# Patient Record
Sex: FEMALE | Race: WHITE | NOT HISPANIC OR LATINO | Employment: OTHER | URBAN - METROPOLITAN AREA
[De-identification: names, ages, dates, MRNs, and addresses within clinical notes are randomized per-mention and may not be internally consistent; named-entity substitution may affect disease eponyms.]

---

## 2017-01-17 ENCOUNTER — TRANSCRIBE ORDERS (OUTPATIENT)
Dept: LAB | Facility: HOSPITAL | Age: 82
End: 2017-01-17

## 2017-01-17 DIAGNOSIS — D17.71 ANGIOMYOLIPOMA OF RIGHT KIDNEY: Primary | ICD-10-CM

## 2017-01-18 ENCOUNTER — HOSPITAL ENCOUNTER (OUTPATIENT)
Dept: RADIOLOGY | Facility: HOSPITAL | Age: 82
Discharge: HOME | End: 2017-01-18
Attending: UROLOGY
Payer: COMMERCIAL

## 2017-01-18 DIAGNOSIS — D17.71 ANGIOMYOLIPOMA OF RIGHT KIDNEY: ICD-10-CM

## 2017-01-18 PROCEDURE — 76770 US EXAM ABDO BACK WALL COMP: CPT

## 2017-05-19 ENCOUNTER — APPOINTMENT (EMERGENCY)
Dept: RADIOLOGY | Facility: HOSPITAL | Age: 82
End: 2017-05-19
Payer: COMMERCIAL

## 2017-05-19 ENCOUNTER — HOSPITAL ENCOUNTER (EMERGENCY)
Facility: HOSPITAL | Age: 82
Discharge: HOME | End: 2017-05-19
Attending: EMERGENCY MEDICINE | Admitting: EMERGENCY MEDICINE
Payer: COMMERCIAL

## 2017-05-19 VITALS
HEART RATE: 62 BPM | BODY MASS INDEX: 35.5 KG/M2 | HEIGHT: 61 IN | SYSTOLIC BLOOD PRESSURE: 143 MMHG | TEMPERATURE: 98.2 F | DIASTOLIC BLOOD PRESSURE: 68 MMHG | OXYGEN SATURATION: 95 % | WEIGHT: 188 LBS | RESPIRATION RATE: 18 BRPM
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
## 2017-05-19 DIAGNOSIS — S05.42XA LACERATION OF LEFT ORBIT, INITIAL ENCOUNTER: ICD-10-CM

## 2017-05-19 DIAGNOSIS — S09.90XA HEAD INJURY DUE TO TRAUMA, INITIAL ENCOUNTER: ICD-10-CM

## 2017-05-19 PROCEDURE — 99284 EMERGENCY DEPT VISIT MOD MDM: CPT

## 2017-05-19 PROCEDURE — 72125 CT NECK SPINE W/O DYE: CPT

## 2017-05-19 PROCEDURE — 90715 TDAP VACCINE 7 YRS/> IM: CPT | Performed by: EMERGENCY MEDICINE

## 2017-05-19 PROCEDURE — 70450 CT HEAD/BRAIN W/O DYE: CPT

## 2017-05-19 PROCEDURE — 90471 IMMUNIZATION ADMIN: CPT

## 2017-05-19 RX ORDER — VALSARTAN AND HYDROCHLOROTHIAZIDE 160; 25 MG/1; MG/1
1 TABLET ORAL DAILY
COMMUNITY
End: 2018-07-25 | Stop reason: ALTCHOICE

## 2017-05-19 RX ORDER — DIPHENOXYLATE HYDROCHLORIDE AND ATROPINE SULFATE 2.5; .025 MG/1; MG/1
1 TABLET ORAL DAILY
COMMUNITY
End: 2018-08-28 | Stop reason: HOSPADM

## 2017-05-19 RX ORDER — LANOLIN ALCOHOL/MO/W.PET/CERES
1 CREAM (GRAM) TOPICAL 3 TIMES DAILY
COMMUNITY
End: 2018-08-28 | Stop reason: HOSPADM

## 2017-05-19 RX ORDER — ATORVASTATIN CALCIUM 10 MG/1
10 TABLET, FILM COATED ORAL DAILY
COMMUNITY
End: 2018-08-28 | Stop reason: HOSPADM

## 2017-05-19 RX ORDER — CITALOPRAM 20 MG/1
20 TABLET ORAL DAILY
Status: ON HOLD | COMMUNITY
End: 2018-08-25 | Stop reason: ALTCHOICE

## 2017-05-19 RX ADMIN — TETANUS TOXOID, REDUCED DIPHTHERIA TOXOID AND ACELLULAR PERTUSSIS VACCINE, ADSORBED 0.5 ML: 5; 2.5; 8; 8; 2.5 SUSPENSION INTRAMUSCULAR at 12:14

## 2017-06-18 ENCOUNTER — APPOINTMENT (EMERGENCY)
Dept: RADIOLOGY | Facility: HOSPITAL | Age: 82
End: 2017-06-18
Payer: COMMERCIAL

## 2017-06-18 ENCOUNTER — HOSPITAL ENCOUNTER (EMERGENCY)
Facility: HOSPITAL | Age: 82
Discharge: HOME | End: 2017-06-18
Attending: EMERGENCY MEDICINE | Admitting: EMERGENCY MEDICINE
Payer: COMMERCIAL

## 2017-06-18 VITALS
RESPIRATION RATE: 18 BRPM | TEMPERATURE: 97.6 F | HEART RATE: 72 BPM | WEIGHT: 190 LBS | SYSTOLIC BLOOD PRESSURE: 174 MMHG | BODY MASS INDEX: 35.9 KG/M2 | OXYGEN SATURATION: 94 % | DIASTOLIC BLOOD PRESSURE: 79 MMHG

## 2017-06-18 DIAGNOSIS — N39.0 URINARY TRACT INFECTION: ICD-10-CM
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
## 2017-06-18 DIAGNOSIS — S00.81XA FACIAL ABRASION, INITIAL ENCOUNTER: ICD-10-CM

## 2017-06-18 LAB
ANION GAP SERPL CALCULATED.3IONS-SCNC: 8 MMOL/L (ref 4–13)
ATRIAL RATE: 73 BPM
BACTERIA UR QL AUTO: ABNORMAL /HPF
BASOPHILS # BLD AUTO: 0.03 THOUSANDS/ΜL (ref 0–0.1)
BASOPHILS NFR BLD AUTO: 0 % (ref 0–1)
BILIRUB UR QL STRIP: NEGATIVE
BUN SERPL-MCNC: 8 MG/DL (ref 5–25)
CALCIUM SERPL-MCNC: 9.3 MG/DL (ref 8.3–10.1)
CHLORIDE SERPL-SCNC: 96 MMOL/L (ref 100–108)
CLARITY UR: CLEAR
CO2 SERPL-SCNC: 32 MMOL/L (ref 21–32)
COLOR UR: YELLOW
CREAT SERPL-MCNC: 0.5 MG/DL (ref 0.6–1.3)
EOSINOPHIL # BLD AUTO: 0.15 THOUSAND/ΜL (ref 0–0.61)
EOSINOPHIL NFR BLD AUTO: 2 % (ref 0–6)
ERYTHROCYTE [DISTWIDTH] IN BLOOD BY AUTOMATED COUNT: 13.7 % (ref 11.6–15.1)
GFR SERPL CREATININE-BSD FRML MDRD: >60 ML/MIN/1.73SQ M
GLUCOSE SERPL-MCNC: 106 MG/DL (ref 65–140)
GLUCOSE UR STRIP-MCNC: NEGATIVE MG/DL
HCT VFR BLD AUTO: 40.8 % (ref 34.8–46.1)
HGB BLD-MCNC: 13.6 G/DL (ref 11.5–15.4)
HGB UR QL STRIP.AUTO: ABNORMAL
HYALINE CASTS #/AREA URNS LPF: ABNORMAL /LPF
KETONES UR STRIP-MCNC: NEGATIVE MG/DL
LEUKOCYTE ESTERASE UR QL STRIP: ABNORMAL
LYMPHOCYTES # BLD AUTO: 1.45 THOUSANDS/ΜL (ref 0.6–4.47)
LYMPHOCYTES NFR BLD AUTO: 18 % (ref 14–44)
MCH RBC QN AUTO: 30.4 PG (ref 26.8–34.3)
MCHC RBC AUTO-ENTMCNC: 33.3 G/DL (ref 31.4–37.4)
MCV RBC AUTO: 91 FL (ref 82–98)
MONOCYTES # BLD AUTO: 0.73 THOUSAND/ΜL (ref 0.17–1.22)
MONOCYTES NFR BLD AUTO: 9 % (ref 4–12)
NEUTROPHILS # BLD AUTO: 5.81 THOUSANDS/ΜL (ref 1.85–7.62)
NEUTS SEG NFR BLD AUTO: 71 % (ref 43–75)
NITRITE UR QL STRIP: NEGATIVE
NON-SQ EPI CELLS URNS QL MICRO: ABNORMAL /HPF
NRBC BLD AUTO-RTO: 0 /100 WBCS
P AXIS: 83 DEGREES
PH UR STRIP.AUTO: 6.5 [PH] (ref 4.5–8)
PLATELET # BLD AUTO: 279 THOUSANDS/UL (ref 149–390)
PMV BLD AUTO: 9.4 FL (ref 8.9–12.7)
POTASSIUM SERPL-SCNC: 3.4 MMOL/L (ref 3.5–5.3)
PR INTERVAL: 190 MS
PROT UR STRIP-MCNC: ABNORMAL MG/DL
QRS AXIS: 44 DEGREES
QRSD INTERVAL: 94 MS
QT INTERVAL: 388 MS
QTC INTERVAL: 427 MS
RBC # BLD AUTO: 4.47 MILLION/UL (ref 3.81–5.12)
RBC #/AREA URNS AUTO: ABNORMAL /HPF
SODIUM SERPL-SCNC: 136 MMOL/L (ref 136–145)
SP GR UR STRIP.AUTO: 1.01 (ref 1–1.03)
T WAVE AXIS: 71 DEGREES
UROBILINOGEN UR QL STRIP.AUTO: 0.2 E.U./DL
VENTRICULAR RATE: 73 BPM
WBC # BLD AUTO: 8.19 THOUSAND/UL (ref 4.31–10.16)
WBC #/AREA URNS AUTO: ABNORMAL /HPF

## 2017-06-18 PROCEDURE — 87186 SC STD MICRODIL/AGAR DIL: CPT

## 2017-06-18 PROCEDURE — 87077 CULTURE AEROBIC IDENTIFY: CPT

## 2017-06-18 PROCEDURE — 93005 ELECTROCARDIOGRAM TRACING: CPT | Performed by: EMERGENCY MEDICINE

## 2017-06-18 PROCEDURE — 99284 EMERGENCY DEPT VISIT MOD MDM: CPT

## 2017-06-18 PROCEDURE — 85025 COMPLETE CBC W/AUTO DIFF WBC: CPT | Performed by: EMERGENCY MEDICINE

## 2017-06-18 PROCEDURE — 36415 COLL VENOUS BLD VENIPUNCTURE: CPT | Performed by: EMERGENCY MEDICINE

## 2017-06-18 PROCEDURE — 81001 URINALYSIS AUTO W/SCOPE: CPT

## 2017-06-18 PROCEDURE — 80048 BASIC METABOLIC PNL TOTAL CA: CPT | Performed by: EMERGENCY MEDICINE

## 2017-06-18 PROCEDURE — 70450 CT HEAD/BRAIN W/O DYE: CPT

## 2017-06-18 PROCEDURE — 87086 URINE CULTURE/COLONY COUNT: CPT

## 2017-06-18 PROCEDURE — 72125 CT NECK SPINE W/O DYE: CPT

## 2017-06-18 RX ORDER — CEPHALEXIN 250 MG/1
500 CAPSULE ORAL ONCE
Status: COMPLETED | OUTPATIENT
Start: 2017-06-18 | End: 2017-06-18

## 2017-06-18 RX ORDER — CEPHALEXIN 500 MG/1
500 CAPSULE ORAL 4 TIMES DAILY
Qty: 20 CAPSULE | Refills: 0 | Status: SHIPPED | OUTPATIENT
Start: 2017-06-18 | End: 2017-06-23

## 2017-06-18 RX ADMIN — CEPHALEXIN 500 MG: 250 CAPSULE ORAL at 21:41

## 2017-06-21 LAB — BACTERIA UR CULT: NORMAL

## 2017-06-26 ENCOUNTER — TRANSCRIBE ORDERS (OUTPATIENT)
Dept: ADMINISTRATIVE | Facility: HOSPITAL | Age: 82
End: 2017-06-26

## 2017-06-26 DIAGNOSIS — M79.89 LEFT LEG SWELLING: Primary | ICD-10-CM

## 2017-06-27 ENCOUNTER — HOSPITAL ENCOUNTER (OUTPATIENT)
Dept: ULTRASOUND IMAGING | Facility: HOSPITAL | Age: 82
Discharge: HOME | End: 2017-06-27
Payer: COMMERCIAL

## 2017-06-27 DIAGNOSIS — M79.89 LEFT LEG SWELLING: ICD-10-CM

## 2017-06-27 PROCEDURE — 93971 EXTREMITY STUDY: CPT

## 2017-09-18 ENCOUNTER — TRANSCRIBE ORDERS (OUTPATIENT)
Dept: LAB | Facility: HOSPITAL | Age: 82
End: 2017-09-18

## 2017-09-18 ENCOUNTER — APPOINTMENT (OUTPATIENT)
Dept: LAB | Facility: HOSPITAL | Age: 82
End: 2017-09-18
Attending: INTERNAL MEDICINE
Payer: COMMERCIAL

## 2017-09-18 DIAGNOSIS — J44.9 OBSTRUCTIVE CHRONIC BRONCHITIS WITHOUT EXACERBATION (HCC): ICD-10-CM

## 2017-09-18 DIAGNOSIS — D50.9 IRON DEFICIENCY ANEMIA, UNSPECIFIED: ICD-10-CM

## 2017-09-18 DIAGNOSIS — J44.9 OBSTRUCTIVE CHRONIC BRONCHITIS WITHOUT EXACERBATION (HCC): Primary | ICD-10-CM

## 2017-09-18 LAB
ALBUMIN SERPL BCP-MCNC: 3.5 G/DL (ref 3.5–5)
ALP SERPL-CCNC: 105 U/L (ref 46–116)
ALT SERPL W P-5'-P-CCNC: 18 U/L (ref 12–78)
ANION GAP SERPL CALCULATED.3IONS-SCNC: 9 MMOL/L (ref 4–13)
AST SERPL W P-5'-P-CCNC: 22 U/L (ref 5–45)
BILIRUB SERPL-MCNC: 0.54 MG/DL (ref 0.2–1)
BUN SERPL-MCNC: 9 MG/DL (ref 5–25)
CALCIUM SERPL-MCNC: 9.8 MG/DL (ref 8.3–10.1)
CHLORIDE SERPL-SCNC: 95 MMOL/L (ref 100–108)
CO2 SERPL-SCNC: 31 MMOL/L (ref 21–32)
CREAT SERPL-MCNC: 0.63 MG/DL (ref 0.6–1.3)
ERYTHROCYTE [DISTWIDTH] IN BLOOD BY AUTOMATED COUNT: 13.7 % (ref 11.6–15.1)
GFR SERPL CREATININE-BSD FRML MDRD: 84 ML/MIN/1.73SQ M
GLUCOSE P FAST SERPL-MCNC: 82 MG/DL (ref 65–99)
HCT VFR BLD AUTO: 42.4 % (ref 34.8–46.1)
HGB BLD-MCNC: 13.9 G/DL (ref 11.5–15.4)
MCH RBC QN AUTO: 30.3 PG (ref 26.8–34.3)
MCHC RBC AUTO-ENTMCNC: 32.8 G/DL (ref 31.4–37.4)
MCV RBC AUTO: 92 FL (ref 82–98)
PLATELET # BLD AUTO: 335 THOUSANDS/UL (ref 149–390)
PMV BLD AUTO: 10.1 FL (ref 8.9–12.7)
POTASSIUM SERPL-SCNC: 4.2 MMOL/L (ref 3.5–5.3)
PROT SERPL-MCNC: 7.8 G/DL (ref 6.4–8.2)
RBC # BLD AUTO: 4.59 MILLION/UL (ref 3.81–5.12)
SODIUM SERPL-SCNC: 135 MMOL/L (ref 136–145)
WBC # BLD AUTO: 6.82 THOUSAND/UL (ref 4.31–10.16)

## 2017-09-18 PROCEDURE — 36415 COLL VENOUS BLD VENIPUNCTURE: CPT

## 2017-09-18 PROCEDURE — 80053 COMPREHEN METABOLIC PANEL: CPT

## 2017-09-18 PROCEDURE — 85027 COMPLETE CBC AUTOMATED: CPT

## 2017-10-18 ENCOUNTER — HOSPITAL ENCOUNTER (OUTPATIENT)
Dept: RADIOLOGY | Facility: HOSPITAL | Age: 82
Discharge: HOME | End: 2017-10-18
Attending: INTERNAL MEDICINE
Payer: COMMERCIAL

## 2017-10-18 DIAGNOSIS — J44.9 OBSTRUCTIVE CHRONIC BRONCHITIS WITHOUT EXACERBATION (HCC): ICD-10-CM

## 2018-01-12 NOTE — RESULT NOTES
Verified Results  * XR KNEE 3 VW RIGHT NON INJURY 21Sep2016 02:44PM Leny Savage Order Number: [id number]   Performing Comments: Rm 6     Test Name Result Flag Reference   XR KNEE 3 VW RIGHT (Report)     RIGHT KNEE     INDICATION: Right knee pain  COMPARISON: None     VIEWS: 3; 3 images     FINDINGS:     Right total knee arthroplasty is noted  No fracture or dislocation is seen  No lucency is seen about the hardware  No lytic or blastic lesions are seen  Soft tissues are unremarkable  IMPRESSION:     No acute osseous abnormality  Workstation performed: [id number]     Signed by: Phillip Meyers MD   9/21/16       Plan  Unlinked    · XR KNEE 1 OR 2 VIEW LEFT; Status:Active;  Requested for:21Sep2016;

## 2018-01-16 NOTE — MISCELLANEOUS
Message  I called and left a voicemail message for Carmelita to call with any concerns  Signatures   Electronically signed by :  MARAH Weir ; Sep 28 2016  2:14PM EST                       (Author)

## 2018-02-16 ENCOUNTER — TRANSCRIBE ORDERS (OUTPATIENT)
Dept: LAB | Facility: HOSPITAL | Age: 83
End: 2018-02-16

## 2018-02-16 ENCOUNTER — APPOINTMENT (OUTPATIENT)
Dept: LAB | Facility: HOSPITAL | Age: 83
End: 2018-02-16
Attending: INTERNAL MEDICINE
Payer: COMMERCIAL

## 2018-02-16 DIAGNOSIS — R10.13 ABDOMINAL PAIN, EPIGASTRIC: ICD-10-CM

## 2018-02-16 DIAGNOSIS — S27.809A RUPTURE OF DIAPHRAGM: Primary | ICD-10-CM

## 2018-02-16 DIAGNOSIS — R53.83 TIREDNESS: ICD-10-CM

## 2018-02-16 DIAGNOSIS — M79.10 MYALGIA: ICD-10-CM

## 2018-02-16 DIAGNOSIS — E86.0 DEHYDRATION: ICD-10-CM

## 2018-02-16 DIAGNOSIS — R53.81 MALAISE: ICD-10-CM

## 2018-02-16 DIAGNOSIS — E55.9 VITAMIN D DEFICIENCY DISEASE: ICD-10-CM

## 2018-02-16 DIAGNOSIS — E78.5 HYPERLIPIDEMIA, UNSPECIFIED HYPERLIPIDEMIA TYPE: ICD-10-CM

## 2018-02-16 DIAGNOSIS — D58.0 HEREDITARY SPHEROCYTOSIS (HCC): ICD-10-CM

## 2018-02-16 DIAGNOSIS — S27.809A RUPTURE OF DIAPHRAGM: ICD-10-CM

## 2018-02-16 DIAGNOSIS — R30.0 DYSURIA: ICD-10-CM

## 2018-02-16 LAB
BASOPHILS # BLD AUTO: 0.04 THOUSANDS/ΜL (ref 0–0.1)
BASOPHILS NFR BLD AUTO: 1 % (ref 0–1)
EOSINOPHIL # BLD AUTO: 0.1 THOUSAND/ΜL (ref 0–0.61)
EOSINOPHIL NFR BLD AUTO: 2 % (ref 0–6)
ERYTHROCYTE [DISTWIDTH] IN BLOOD BY AUTOMATED COUNT: 13.4 % (ref 11.6–15.1)
FERRITIN SERPL-MCNC: 46 NG/ML (ref 8–388)
HCT VFR BLD AUTO: 41 % (ref 34.8–46.1)
HGB BLD-MCNC: 13.7 G/DL (ref 11.5–15.4)
IRON SERPL-MCNC: 77 UG/DL (ref 50–170)
LYMPHOCYTES # BLD AUTO: 2.07 THOUSANDS/ΜL (ref 0.6–4.47)
LYMPHOCYTES NFR BLD AUTO: 31 % (ref 14–44)
MCH RBC QN AUTO: 30.2 PG (ref 26.8–34.3)
MCHC RBC AUTO-ENTMCNC: 33.4 G/DL (ref 31.4–37.4)
MCV RBC AUTO: 90 FL (ref 82–98)
MONOCYTES # BLD AUTO: 0.67 THOUSAND/ΜL (ref 0.17–1.22)
MONOCYTES NFR BLD AUTO: 10 % (ref 4–12)
NEUTROPHILS # BLD AUTO: 3.88 THOUSANDS/ΜL (ref 1.85–7.62)
NEUTS SEG NFR BLD AUTO: 56 % (ref 43–75)
NRBC BLD AUTO-RTO: 0 /100 WBCS
PLATELET # BLD AUTO: 331 THOUSANDS/UL (ref 149–390)
PMV BLD AUTO: 10.5 FL (ref 8.9–12.7)
RBC # BLD AUTO: 4.54 MILLION/UL (ref 3.81–5.12)
WBC # BLD AUTO: 6.77 THOUSAND/UL (ref 4.31–10.16)

## 2018-02-16 PROCEDURE — 36415 COLL VENOUS BLD VENIPUNCTURE: CPT

## 2018-02-16 PROCEDURE — 85025 COMPLETE CBC W/AUTO DIFF WBC: CPT

## 2018-02-16 PROCEDURE — 83540 ASSAY OF IRON: CPT

## 2018-02-16 PROCEDURE — 82728 ASSAY OF FERRITIN: CPT

## 2018-02-21 ENCOUNTER — APPOINTMENT (OUTPATIENT)
Dept: LAB | Facility: HOSPITAL | Age: 83
End: 2018-02-21
Payer: COMMERCIAL

## 2018-02-21 ENCOUNTER — OFFICE VISIT (OUTPATIENT)
Dept: CARDIOLOGY CLINIC | Facility: CLINIC | Age: 83
End: 2018-02-21
Payer: COMMERCIAL

## 2018-02-21 VITALS
BODY MASS INDEX: 32.28 KG/M2 | WEIGHT: 171 LBS | HEART RATE: 66 BPM | DIASTOLIC BLOOD PRESSURE: 80 MMHG | HEIGHT: 61 IN | SYSTOLIC BLOOD PRESSURE: 136 MMHG

## 2018-02-21 DIAGNOSIS — Z72.0 TOBACCO ABUSE: ICD-10-CM

## 2018-02-21 DIAGNOSIS — R30.0 DYSURIA: ICD-10-CM

## 2018-02-21 DIAGNOSIS — S27.809A RUPTURE OF DIAPHRAGM: ICD-10-CM

## 2018-02-21 DIAGNOSIS — M79.10 MYALGIA: ICD-10-CM

## 2018-02-21 DIAGNOSIS — R53.83 TIREDNESS: ICD-10-CM

## 2018-02-21 DIAGNOSIS — E78.00 PURE HYPERCHOLESTEROLEMIA: ICD-10-CM

## 2018-02-21 DIAGNOSIS — D58.0 HEREDITARY SPHEROCYTOSIS (HCC): ICD-10-CM

## 2018-02-21 DIAGNOSIS — R53.81 MALAISE: ICD-10-CM

## 2018-02-21 DIAGNOSIS — E78.5 HYPERLIPIDEMIA, UNSPECIFIED HYPERLIPIDEMIA TYPE: ICD-10-CM

## 2018-02-21 DIAGNOSIS — I10 ESSENTIAL (PRIMARY) HYPERTENSION: Primary | ICD-10-CM

## 2018-02-21 DIAGNOSIS — E86.0 DEHYDRATION: ICD-10-CM

## 2018-02-21 DIAGNOSIS — R10.13 ABDOMINAL PAIN, EPIGASTRIC: ICD-10-CM

## 2018-02-21 DIAGNOSIS — E55.9 VITAMIN D DEFICIENCY DISEASE: ICD-10-CM

## 2018-02-21 LAB
ALBUMIN SERPL BCP-MCNC: 3.9 G/DL (ref 3.5–5)
ALP SERPL-CCNC: 98 U/L (ref 46–116)
ALT SERPL W P-5'-P-CCNC: 18 U/L (ref 12–78)
ANION GAP SERPL CALCULATED.3IONS-SCNC: 4 MMOL/L (ref 4–13)
AST SERPL W P-5'-P-CCNC: 17 U/L (ref 5–45)
BACTERIA UR QL AUTO: ABNORMAL /HPF
BASOPHILS # BLD AUTO: 0.03 THOUSANDS/ΜL (ref 0–0.1)
BASOPHILS NFR BLD AUTO: 1 % (ref 0–1)
BILIRUB SERPL-MCNC: 0.5 MG/DL (ref 0.2–1)
BILIRUB UR QL STRIP: NEGATIVE
BUN SERPL-MCNC: 12 MG/DL (ref 5–25)
CALCIUM SERPL-MCNC: 9.6 MG/DL (ref 8.3–10.1)
CHLORIDE SERPL-SCNC: 97 MMOL/L (ref 100–108)
CHOLEST SERPL-MCNC: 179 MG/DL (ref 50–200)
CLARITY UR: ABNORMAL
CO2 SERPL-SCNC: 34 MMOL/L (ref 21–32)
COLOR UR: YELLOW
CREAT SERPL-MCNC: 0.56 MG/DL (ref 0.6–1.3)
EOSINOPHIL # BLD AUTO: 0.1 THOUSAND/ΜL (ref 0–0.61)
EOSINOPHIL NFR BLD AUTO: 2 % (ref 0–6)
ERYTHROCYTE [DISTWIDTH] IN BLOOD BY AUTOMATED COUNT: 13.5 % (ref 11.6–15.1)
GFR SERPL CREATININE-BSD FRML MDRD: 87 ML/MIN/1.73SQ M
GLUCOSE P FAST SERPL-MCNC: 93 MG/DL (ref 65–99)
GLUCOSE UR STRIP-MCNC: NEGATIVE MG/DL
HCT VFR BLD AUTO: 44.3 % (ref 34.8–46.1)
HDLC SERPL-MCNC: 72 MG/DL (ref 40–60)
HGB BLD-MCNC: 15 G/DL (ref 11.5–15.4)
HGB UR QL STRIP.AUTO: NEGATIVE
HYALINE CASTS #/AREA URNS LPF: ABNORMAL /LPF
KETONES UR STRIP-MCNC: NEGATIVE MG/DL
LDLC SERPL CALC-MCNC: 93 MG/DL (ref 0–100)
LEUKOCYTE ESTERASE UR QL STRIP: ABNORMAL
LYMPHOCYTES # BLD AUTO: 1.42 THOUSANDS/ΜL (ref 0.6–4.47)
LYMPHOCYTES NFR BLD AUTO: 27 % (ref 14–44)
MCH RBC QN AUTO: 30.7 PG (ref 26.8–34.3)
MCHC RBC AUTO-ENTMCNC: 33.9 G/DL (ref 31.4–37.4)
MCV RBC AUTO: 91 FL (ref 82–98)
MONOCYTES # BLD AUTO: 0.59 THOUSAND/ΜL (ref 0.17–1.22)
MONOCYTES NFR BLD AUTO: 11 % (ref 4–12)
NEUTROPHILS # BLD AUTO: 3.18 THOUSANDS/ΜL (ref 1.85–7.62)
NEUTS SEG NFR BLD AUTO: 59 % (ref 43–75)
NITRITE UR QL STRIP: POSITIVE
NON-SQ EPI CELLS URNS QL MICRO: ABNORMAL /HPF
NRBC BLD AUTO-RTO: 0 /100 WBCS
PH UR STRIP.AUTO: 7 [PH] (ref 4.5–8)
PLATELET # BLD AUTO: 315 THOUSANDS/UL (ref 149–390)
PMV BLD AUTO: 10.8 FL (ref 8.9–12.7)
POTASSIUM SERPL-SCNC: 3.9 MMOL/L (ref 3.5–5.3)
PROT SERPL-MCNC: 7.8 G/DL (ref 6.4–8.2)
PROT UR STRIP-MCNC: ABNORMAL MG/DL
RBC # BLD AUTO: 4.89 MILLION/UL (ref 3.81–5.12)
RBC #/AREA URNS AUTO: ABNORMAL /HPF
SODIUM SERPL-SCNC: 135 MMOL/L (ref 136–145)
SP GR UR STRIP.AUTO: 1.01 (ref 1–1.03)
T3FREE SERPL-MCNC: 2.85 PG/ML (ref 2.3–4.2)
T4 FREE SERPL-MCNC: 1.04 NG/DL (ref 0.76–1.46)
TRIGL SERPL-MCNC: 71 MG/DL
TSH SERPL DL<=0.05 MIU/L-ACNC: 0.98 UIU/ML (ref 0.36–3.74)
UROBILINOGEN UR QL STRIP.AUTO: 1 E.U./DL
VIT B12 SERPL-MCNC: 489 PG/ML (ref 100–900)
WBC # BLD AUTO: 5.33 THOUSAND/UL (ref 4.31–10.16)
WBC #/AREA URNS AUTO: ABNORMAL /HPF

## 2018-02-21 PROCEDURE — 80061 LIPID PANEL: CPT

## 2018-02-21 PROCEDURE — 99204 OFFICE O/P NEW MOD 45 MIN: CPT | Performed by: INTERNAL MEDICINE

## 2018-02-21 PROCEDURE — 85025 COMPLETE CBC W/AUTO DIFF WBC: CPT

## 2018-02-21 PROCEDURE — 87077 CULTURE AEROBIC IDENTIFY: CPT

## 2018-02-21 PROCEDURE — 81001 URINALYSIS AUTO W/SCOPE: CPT

## 2018-02-21 PROCEDURE — 82306 VITAMIN D 25 HYDROXY: CPT

## 2018-02-21 PROCEDURE — 84481 FREE ASSAY (FT-3): CPT

## 2018-02-21 PROCEDURE — 93000 ELECTROCARDIOGRAM COMPLETE: CPT | Performed by: INTERNAL MEDICINE

## 2018-02-21 PROCEDURE — 80053 COMPREHEN METABOLIC PANEL: CPT

## 2018-02-21 PROCEDURE — 84443 ASSAY THYROID STIM HORMONE: CPT

## 2018-02-21 PROCEDURE — 84439 ASSAY OF FREE THYROXINE: CPT

## 2018-02-21 PROCEDURE — 82607 VITAMIN B-12: CPT

## 2018-02-21 PROCEDURE — 87086 URINE CULTURE/COLONY COUNT: CPT

## 2018-02-21 PROCEDURE — 87186 SC STD MICRODIL/AGAR DIL: CPT

## 2018-02-21 PROCEDURE — 36415 COLL VENOUS BLD VENIPUNCTURE: CPT

## 2018-02-21 NOTE — PATIENT INSTRUCTIONS
I will continue the patient's current medical regimen  I have asked the patient to call if there is a problem in the interim otherwise I will see the patient in six months time  The patient is due for an echo prior to the next visit to assess LV wall thickness and systolic function    I will also schedule a pharmacologic nuclear stress test

## 2018-02-21 NOTE — PROGRESS NOTES
Cardiology Consultation     Rudy Kaye  [id number]  1935  HEART & VASCULAR [facility] [address]  1  Pure hypercholesterolemia     2  Tobacco abuse     3  Essential (primary) hypertension       There is no problem list on file for this patient  HPI patient is here for a cardiac evaluation  Patient has a history of COPD, hypertension and hyperlipidemia  She has a tobacco use history  Recently she was watching a TV program about two weeks ago and got an episode of chest discomfort  The following morning after eating her breakfast she got a similar type of discomfort and it has happened subsequently associated with eating  It is a pressure sensation in her chest   She denies any difficulty swallowing  She does relate to me that she went to her gastroenterologist and was seen and a cardiac evaluation was recommended  She does tell me she had prior EGD done by Dr Favian Lopez  and had a procedure performed remotely but cannot remember what it was  She has no prior history of cardiac disease  She is a long-term tobacco user  Her mother  at the age of 80 from a heart attack and her father apparently had a cardiac aneurysm and  at the age of 76  Her  dose of his remotely known to me in passed away 11 years ago  She is here for advice in reference to further management  Patient's EKG today demonstrates sinus rhythm with poor R-wave progression  Compared to a prior EKG done 2017 dear has been no significant interval change except perhaps for some change in chest lead placement on today's tracing  PMH-  Hiatal hernia  Past Medical History:   Diagnosis Date    COPD [facility]chronic obstructive pulmonary disease) ([facility] [address] )     Hyperlipidemia     Hypertension         SOCIAL HISTORY-  Social History     Social History    Marital status:       Spouse name: N/A    Number of children: N/A    Years of education: N/A     Occupational History    Not on file  Social History Main Topics    Smoking status: Current Every Day Smoker     Packs/day: 1 00     Types: Cigarettes    Smokeless tobacco: Never Used    Alcohol use No    Drug use: No    Sexual activity: Not on file     Other Topics Concern    Not on file     Social History Narrative    No narrative on file        FAMILY HISTORY-  Mother  of a heart attack at the age of 80 and her father had a cardiac aneurysm and  at the age of 76  No family history on file  SURGICAL HISTORY-  Past Surgical History:   Procedure Laterality Date    HIP SURGERY      REPLACEMENT TOTAL KNEE Right          Current Outpatient Prescriptions:     atorvastatin (LIPITOR) 10 mg tablet, Take 10 mg by mouth daily, Disp: , Rfl:     Calcium Citrate-Vitamin D (CALCIUM + D PO), Take by mouth, Disp: , Rfl:     citalopram (CeleXA) 20 mg tablet, Take 20 mg by mouth daily, Disp: , Rfl:     glucosamine-chondroitin 500-400 MG tablet, Take 1 tablet by mouth 3 (three) times a day, Disp: , Rfl:     Iron-Vit C-Vit B12-Folic Acid (IRON 333 PLUS PO), Take by mouth, Disp: , Rfl:     multivitamin (THERAGRAN) TABS, Take 1 tablet by mouth daily, Disp: , Rfl:     Psyllium (METAMUCIL FIBER PO), Take by mouth, Disp: , Rfl:     Tiotropium Bromide Monohydrate (SPIRIVA HANDIHALER IN), Inhale, Disp: , Rfl:     valsartan-hydrochlorothiazide (DIOVAN-HCT) 160-25 MG per tablet, Take 1 tablet by mouth daily, Disp: , Rfl:   No Known Allergies  There were no vitals filed for this visit  Review of Systems:  Review of Systems   Cardiovascular: Positive for chest pain  All other systems reviewed and are negative  Physical Exam:  Physical Exam   Constitutional: She is oriented to person, place, and time  She appears well-developed and well-nourished  HENT:   Head: Normocephalic and atraumatic     Eyes: Conjunctivae and EOM are normal  Pupils are equal, round, and reactive to light  Neck: Normal range of motion  Neck supple  Cardiovascular: Normal rate and normal heart sounds  Pulmonary/Chest: Effort normal    Reduced breath sounds   Neurological: She is alert and oriented to person, place, and time  Skin: Skin is warm and dry  Psychiatric: She has a normal mood and affect  Vitals reviewed  Discussion/Summary:  The etiology of her discomfort is unclear to me  Initially was not associated with food but thereafter it has been associated with food with an unclear prior GI history  In any event we will exclude a cardiac etiology given her multiple risk factors for coronary disease and her age  We will check a pharmacologic nuclear stress test as she cannot walk adequately on a treadmill for exercise stress  We will check an echocardiogram to assess LV wall thickness and systolic function  I will arrange follow-up visit  I have asked her to call me in the interim if she has an issue

## 2018-02-23 LAB — BACTERIA UR CULT: ABNORMAL

## 2018-02-24 LAB
25(OH)D2 SERPL-MCNC: <1 NG/ML
25(OH)D3 SERPL-MCNC: 43 NG/ML
25(OH)D3+25(OH)D2 SERPL-MCNC: 43 NG/ML

## 2018-03-12 ENCOUNTER — HOSPITAL ENCOUNTER (OUTPATIENT)
Dept: NON INVASIVE DIAGNOSTICS | Facility: CLINIC | Age: 83
Discharge: HOME | End: 2018-03-12
Payer: COMMERCIAL

## 2018-03-12 DIAGNOSIS — E78.00 PURE HYPERCHOLESTEROLEMIA: ICD-10-CM

## 2018-03-12 DIAGNOSIS — I10 ESSENTIAL (PRIMARY) HYPERTENSION: ICD-10-CM

## 2018-03-12 DIAGNOSIS — Z72.0 TOBACCO ABUSE: ICD-10-CM

## 2018-03-12 PROCEDURE — 93017 CV STRESS TEST TRACING ONLY: CPT

## 2018-03-12 PROCEDURE — 93306 TTE W/DOPPLER COMPLETE: CPT

## 2018-03-12 PROCEDURE — 93018 CV STRESS TEST I&R ONLY: CPT | Performed by: INTERNAL MEDICINE

## 2018-03-12 PROCEDURE — 93016 CV STRESS TEST SUPVJ ONLY: CPT | Performed by: INTERNAL MEDICINE

## 2018-03-12 PROCEDURE — 78452 HT MUSCLE IMAGE SPECT MULT: CPT | Performed by: INTERNAL MEDICINE

## 2018-03-12 PROCEDURE — 78452 HT MUSCLE IMAGE SPECT MULT: CPT

## 2018-03-12 PROCEDURE — A9502 TC99M TETROFOSMIN: HCPCS

## 2018-03-12 RX ADMIN — REGADENOSON 0.4 MG: 0.08 INJECTION, SOLUTION INTRAVENOUS at 13:35

## 2018-03-13 LAB
CHEST PAIN STATEMENT: NORMAL
MAX DIASTOLIC BP: 96 MMHG
MAX HEART RATE: 106 BPM
MAX PREDICTED HEART RATE: 138 BPM
MAX. SYSTOLIC BP: 170 MMHG
PROTOCOL NAME: NORMAL
REASON FOR TERMINATION: NORMAL
TARGET HR FORMULA: NORMAL
TEST INDICATION: NORMAL
TIME IN EXERCISE PHASE: NORMAL

## 2018-03-14 ENCOUNTER — TELEPHONE (OUTPATIENT)
Dept: CARDIOLOGY CLINIC | Facility: CLINIC | Age: 83
End: 2018-03-14

## 2018-06-06 ENCOUNTER — TRANSCRIBE ORDERS (OUTPATIENT)
Dept: ADMINISTRATIVE | Facility: HOSPITAL | Age: 83
End: 2018-06-06

## 2018-06-06 DIAGNOSIS — C15.9 MALIGNANT NEOPLASM OF ESOPHAGUS, UNSPECIFIED LOCATION (HCC): ICD-10-CM

## 2018-06-06 DIAGNOSIS — R10.13 EPIGASTRIC PAIN: Primary | ICD-10-CM

## 2018-06-13 ENCOUNTER — HOSPITAL ENCOUNTER (OUTPATIENT)
Dept: RADIOLOGY | Facility: HOSPITAL | Age: 83
Discharge: HOME | End: 2018-06-13
Attending: INTERNAL MEDICINE
Payer: COMMERCIAL

## 2018-06-13 DIAGNOSIS — R10.13 EPIGASTRIC PAIN: ICD-10-CM

## 2018-06-13 PROCEDURE — 74220 X-RAY XM ESOPHAGUS 1CNTRST: CPT

## 2018-06-20 ENCOUNTER — HOSPITAL ENCOUNTER (OUTPATIENT)
Facility: HOSPITAL | Age: 83
Setting detail: OUTPATIENT SURGERY
Discharge: HOME | End: 2018-06-20
Attending: INTERNAL MEDICINE | Admitting: INTERNAL MEDICINE
Payer: COMMERCIAL

## 2018-06-20 ENCOUNTER — ANESTHESIA (OUTPATIENT)
Dept: GASTROENTEROLOGY | Facility: HOSPITAL | Age: 83
End: 2018-06-20
Payer: COMMERCIAL

## 2018-06-20 ENCOUNTER — ANESTHESIA EVENT (OUTPATIENT)
Dept: GASTROENTEROLOGY | Facility: HOSPITAL | Age: 83
End: 2018-06-20
Payer: COMMERCIAL

## 2018-06-20 VITALS
WEIGHT: 162 LBS | TEMPERATURE: 98.6 F | DIASTOLIC BLOOD PRESSURE: 62 MMHG | HEART RATE: 64 BPM | RESPIRATION RATE: 16 BRPM | HEIGHT: 61 IN | SYSTOLIC BLOOD PRESSURE: 128 MMHG | OXYGEN SATURATION: 98 % | BODY MASS INDEX: 30.58 KG/M2

## 2018-06-20 DIAGNOSIS — K21.9 GASTRO-ESOPHAGEAL REFLUX DISEASE WITHOUT ESOPHAGITIS: ICD-10-CM

## 2018-06-20 DIAGNOSIS — R13.10 DYSPHAGIA: ICD-10-CM

## 2018-06-20 PROCEDURE — 88341 IMHCHEM/IMCYTCHM EA ADD ANTB: CPT | Performed by: PATHOLOGY

## 2018-06-20 PROCEDURE — 88305 TISSUE EXAM BY PATHOLOGIST: CPT | Performed by: PATHOLOGY

## 2018-06-20 PROCEDURE — 88313 SPECIAL STAINS GROUP 2: CPT | Performed by: PATHOLOGY

## 2018-06-20 PROCEDURE — 88342 IMHCHEM/IMCYTCHM 1ST ANTB: CPT | Performed by: PATHOLOGY

## 2018-06-20 RX ORDER — ALBUTEROL SULFATE 90 UG/1
AEROSOL, METERED RESPIRATORY (INHALATION) AS NEEDED
Status: DISCONTINUED | OUTPATIENT
Start: 2018-06-20 | End: 2018-06-20 | Stop reason: SURG

## 2018-06-20 RX ORDER — PROPOFOL 10 MG/ML
INJECTION, EMULSION INTRAVENOUS AS NEEDED
Status: DISCONTINUED | OUTPATIENT
Start: 2018-06-20 | End: 2018-06-20 | Stop reason: SURG

## 2018-06-20 RX ORDER — LIDOCAINE HYDROCHLORIDE 10 MG/ML
INJECTION, SOLUTION INFILTRATION; PERINEURAL AS NEEDED
Status: DISCONTINUED | OUTPATIENT
Start: 2018-06-20 | End: 2018-06-20 | Stop reason: SURG

## 2018-06-20 RX ORDER — SODIUM CHLORIDE 9 MG/ML
125 INJECTION, SOLUTION INTRAVENOUS CONTINUOUS
Status: DISCONTINUED | OUTPATIENT
Start: 2018-06-20 | End: 2018-06-20 | Stop reason: HOSPADM

## 2018-06-20 RX ORDER — GLYCOPYRROLATE 0.2 MG/ML
INJECTION INTRAMUSCULAR; INTRAVENOUS AS NEEDED
Status: DISCONTINUED | OUTPATIENT
Start: 2018-06-20 | End: 2018-06-20 | Stop reason: SURG

## 2018-06-20 RX ADMIN — PROPOFOL 30 MG: 10 INJECTION, EMULSION INTRAVENOUS at 10:49

## 2018-06-20 RX ADMIN — GLYCOPYRROLATE 0.2 MG: 0.2 INJECTION, SOLUTION INTRAMUSCULAR; INTRAVENOUS at 10:27

## 2018-06-20 RX ADMIN — PROPOFOL 30 MG: 10 INJECTION, EMULSION INTRAVENOUS at 10:52

## 2018-06-20 RX ADMIN — PROPOFOL 30 MG: 10 INJECTION, EMULSION INTRAVENOUS at 10:39

## 2018-06-20 RX ADMIN — PROPOFOL 30 MG: 10 INJECTION, EMULSION INTRAVENOUS at 10:30

## 2018-06-20 RX ADMIN — PROPOFOL 100 MG: 10 INJECTION, EMULSION INTRAVENOUS at 10:27

## 2018-06-20 RX ADMIN — LIDOCAINE HYDROCHLORIDE 100 MG: 10 INJECTION, SOLUTION INFILTRATION; PERINEURAL at 10:27

## 2018-06-20 RX ADMIN — PROPOFOL 30 MG: 10 INJECTION, EMULSION INTRAVENOUS at 10:46

## 2018-06-20 RX ADMIN — PROPOFOL 30 MG: 10 INJECTION, EMULSION INTRAVENOUS at 10:42

## 2018-06-20 RX ADMIN — ALBUTEROL SULFATE 4 PUFF: 90 AEROSOL, METERED RESPIRATORY (INHALATION) at 10:22

## 2018-06-20 RX ADMIN — PROPOFOL 30 MG: 10 INJECTION, EMULSION INTRAVENOUS at 10:34

## 2018-06-20 RX ADMIN — SODIUM CHLORIDE 125 ML/HR: 0.9 INJECTION, SOLUTION INTRAVENOUS at 09:16

## 2018-06-20 NOTE — H&P
H&P EXAM - Outpatient Endoscopy   Destinee Galarza 80 y o  female MRN: [id number]    [facility] PRE/POST   Encounter: [id number]        Impression:  Dysphagia    Plan:  Alert has problems with solid foods    Chief Complaint:  Dysphagia and reflux for EGD    Physical Exam:  Alert in no distress   Chest:  Clear   Heart:  Regular    Procedure reviewed and consent obtained

## 2018-06-20 NOTE — OP NOTE
**** GI/ENDOSCOPY REPORT ****     PATIENT NAME: Uriel Jasso - VISIT ID:  Patient ID: [id number]   YOB: 1935     INTRODUCTION: Esophagogastroduodenoscopy - A 80 female patient presents   for an outpatient Esophagogastroduodenoscopy at [facility] [place]  INDICATIONS: Unspecified dysphagia in response to solids  CONSENT: The benefits, risks, and alternatives to the procedure were   discussed and informed consent was obtained from the patient  PREPARATION: EKG, pulse, pulse oximetry and blood pressure were monitored   throughout the procedure  MEDICATIONS: Anesthesia-check records     PROCEDURE:  The endoscope was passed without difficulty through the mouth   under direct visualization and advanced to the 2nd portion of the   duodenum  The scope was withdrawn and the mucosa was carefully examined  The views were good  The patient's toleration of the procedure was good  FINDINGS:   Esophagus: There was a medium-sized friable tumor, that   measured 4 cm in size, in the distal third of the esophagus and GE   junction  Multiple forceps biopsies was taken (jar 1)  Dilatation was   performed, using a balloon successfully  A 12-13 5-14 mm dilator was   passed  Stomach: There was an 8 cm mixed hiatus hernia visible in the   body of the stomach  COMPLICATIONS: There were no complications  IMPRESSIONS: A tumor found in the distal third of the esophagus and GE   junction  Multiple biopsies taken  A hiatus hernia found in the body of   the stomach  ESTIMATED BLOOD LOSS:     RECOMMENDATIONS: Discharge home when standard parameters are met  Start   soft diet  Follow-up on the results of the biopsy specimens in 7 days  Continue current medications  Follow-up appointment with Dr Deepak Pedroza       PROCEDURE CODES:     ICD-9 Codes: 787 20 Dysphagia, unspecified 239 0 Neoplasm of unspecified   nature of digestive system 553 3 Diaphragmatic hernia without mention of   obstruction or gangrene     ICD-10 Codes: A47 3 Neoplasm of uncertain behavior of other specified   digestive organs K44 Diaphragmatic hernia     PATHOLOGY SPECIMENS: Multiple forceps biopsies taken (jar 1)  Associated   finding: Tumor  PERFORMED BY: MARAH Madrigal [place]  on 06/20/2018  Version 1, electronically signed by MARAH Hays , D O  on   06/20/2018 at 10:59

## 2018-06-20 NOTE — ANESTHESIA POSTPROCEDURE EVALUATION
Post-Op Assessment Note      CV Status:  Stable    Mental Status:  Lethargic    Hydration Status:  Stable and euvolemic    PONV Controlled:  None    Airway Patency:  Patent    Post Op Vitals Reviewed: Yes          Staff: CRNA       Comments: vss          BP   179/69   Temp      Pulse  65   Resp   22   SpO2   100

## 2018-06-20 NOTE — ANESTHESIA PREPROCEDURE EVALUATION
Review of Systems/Medical History  Patient summary reviewed  Chart reviewed  No history of anesthetic complications     Cardiovascular  EKG reviewed, Exercise tolerance (METS): >4,  Hyperlipidemia, Hypertension controlled,    Pulmonary  COPD moderate- medication dependent ,        GI/Hepatic    GERD well controlled,        Negative  ROS        Endo/Other  Negative endo/other ROS      GYN  Negative gynecology ROS          Hematology  Negative hematology ROS      Musculoskeletal  Negative musculoskeletal ROS        Neurology  Negative neurology ROS      Psychology   Negative psychology ROS              Physical Exam    Airway    Mallampati score: II  TM Distance: >3 FB  Neck ROM: full     Dental   upper dentures and lower dentures,     Cardiovascular  Rhythm: regular, Rate: normal, Cardiovascular exam normal    Pulmonary  Pulmonary exam normal Breath sounds clear to auscultation,     Other Findings        Anesthesia Plan  ASA Score- 3     Anesthesia Type- IV sedation with anesthesia with ASA Monitors  Additional Monitors:   Airway Plan:         Plan Factors-    Induction-     Postoperative Plan-     Informed Consent- Anesthetic plan and risks discussed with patient  I personally reviewed this patient with the CRNA  Discussed and agreed on the Anesthesia Plan with the CRNA  Roc Andrew

## 2018-06-25 ENCOUNTER — APPOINTMENT (OUTPATIENT)
Dept: LAB | Facility: HOSPITAL | Age: 83
End: 2018-06-25
Attending: INTERNAL MEDICINE
Payer: COMMERCIAL

## 2018-06-25 DIAGNOSIS — C15.9 MALIGNANT NEOPLASM OF ESOPHAGUS, UNSPECIFIED LOCATION (HCC): ICD-10-CM

## 2018-06-25 DIAGNOSIS — R10.13 EPIGASTRIC PAIN: ICD-10-CM

## 2018-06-25 LAB
ALBUMIN SERPL BCP-MCNC: 3.4 G/DL (ref 3.5–5)
ALP SERPL-CCNC: 98 U/L (ref 46–116)
ALT SERPL W P-5'-P-CCNC: 18 U/L (ref 12–78)
ANION GAP SERPL CALCULATED.3IONS-SCNC: 6 MMOL/L (ref 4–13)
AST SERPL W P-5'-P-CCNC: 16 U/L (ref 5–45)
BILIRUB SERPL-MCNC: 0.5 MG/DL (ref 0.2–1)
BUN SERPL-MCNC: 16 MG/DL (ref 5–25)
CALCIUM SERPL-MCNC: 9.6 MG/DL (ref 8.3–10.1)
CHLORIDE SERPL-SCNC: 99 MMOL/L (ref 100–108)
CO2 SERPL-SCNC: 31 MMOL/L (ref 21–32)
CREAT SERPL-MCNC: 0.69 MG/DL (ref 0.6–1.3)
GFR SERPL CREATININE-BSD FRML MDRD: 81 ML/MIN/1.73SQ M
GLUCOSE P FAST SERPL-MCNC: 84 MG/DL (ref 65–99)
POTASSIUM SERPL-SCNC: 3.5 MMOL/L (ref 3.5–5.3)
PROT SERPL-MCNC: 7 G/DL (ref 6.4–8.2)
SODIUM SERPL-SCNC: 136 MMOL/L (ref 136–145)

## 2018-06-25 PROCEDURE — 80053 COMPREHEN METABOLIC PANEL: CPT

## 2018-06-25 PROCEDURE — 36415 COLL VENOUS BLD VENIPUNCTURE: CPT

## 2018-07-02 ENCOUNTER — HOSPITAL ENCOUNTER (OUTPATIENT)
Dept: RADIOLOGY | Facility: HOSPITAL | Age: 83
Discharge: HOME | End: 2018-07-02
Attending: INTERNAL MEDICINE
Payer: COMMERCIAL

## 2018-07-02 DIAGNOSIS — C15.9 MALIGNANT NEOPLASM OF ESOPHAGUS, UNSPECIFIED LOCATION (HCC): ICD-10-CM

## 2018-07-02 PROCEDURE — 74160 CT ABDOMEN W/CONTRAST: CPT

## 2018-07-02 PROCEDURE — 71260 CT THORAX DX C+: CPT

## 2018-07-02 RX ADMIN — IOHEXOL 100 ML: 350 INJECTION, SOLUTION INTRAVENOUS at 14:24

## 2018-07-05 ENCOUNTER — OFFICE VISIT (OUTPATIENT)
Dept: HEMATOLOGY ONCOLOGY | Facility: CLINIC | Age: 83
End: 2018-07-05
Payer: COMMERCIAL

## 2018-07-05 VITALS
HEART RATE: 70 BPM | DIASTOLIC BLOOD PRESSURE: 80 MMHG | RESPIRATION RATE: 17 BRPM | HEIGHT: 61 IN | TEMPERATURE: 97.6 F | OXYGEN SATURATION: 91 % | SYSTOLIC BLOOD PRESSURE: 116 MMHG | BODY MASS INDEX: 30.58 KG/M2 | WEIGHT: 162 LBS

## 2018-07-05 DIAGNOSIS — C15.5 CANCER OF ABDOMINAL ESOPHAGUS (HCC): Primary | ICD-10-CM

## 2018-07-05 PROCEDURE — 99204 OFFICE O/P NEW MOD 45 MIN: CPT | Performed by: INTERNAL MEDICINE

## 2018-07-05 RX ORDER — ESOMEPRAZOLE MAGNESIUM 40 MG/1
40 FOR SUSPENSION ORAL
COMMUNITY

## 2018-07-05 NOTE — LETTER
July 5, 2018     Yudy Carolina, [address]  [address] [place] [address]    Patient: Stu Gee   YOB: 1935   Date of Visit: 7/5/2018       Dear Dr Krishna Santoyo: Thank you for referring Lory Staley to me for evaluation  Below are my notes for this consultation  If you have questions, please do not hesitate to call me  I look forward to following your patient along with you  Sincerely,        Nick Corona MD        CC: MD Nick Lindsey MD  7/5/2018 11:07 PM  Sign at close encounter  Consultation - Medical Oncology   Stu Gee 80 y o  female MRN: [id number]  Unit/Bed#:  Encounter: [id number]    Referring physician:  Yudy Carolina  Reason for Consult:  Cancer esophagus  HPI: Stu Gee is a 80y o  year old female   She is here with her sister  She was losing weight and over the last 3 months she last her between 20 and 25 lbs  There was some decrease in appetite  Some difficulty in swallowing at the lower end of esophagus  After patient had EGD and colonoscopy in  June 2018 she noticed improved swallowing and stabilization of weight  Appetite  has remained  fair  Patient has generalized weakness and tiredness and arthritic symptoms  Occasionally hoarseness  Cough with mucoid expectoration, wheezing and shortness of breath with exertion  Occasionally chest pain  Indigestion  Fullness in the upper abdomen  Occasionally constipation Nocturia  Occasionally low back discomfort  ROS:  07/05/18 Reviewed 13 systems:  Presently no headaches, seizures, dizziness, diplopia,  palpitations,  hemoptysis, abdominal pain, nausea, vomiting,  melena, hematuria, fever, chills, bleeding, bone pains, skin rash,  weakness, numbness, claudication and gait problem  No frequent infections  Not unusually sensitive to heat or cold  No swelling of the ankles  No swollen glands  Patient is anxious    No GYN symptoms Other symptoms are in HPI        Historical Information   Past Medical History:   Diagnosis Date    [facility]ch[facility] disease) ([facility] [address] )     Esophageal cancer (HCC)     Hyperlipidemia     Hypertension      Past Surgical History:   Procedure Laterality Date    HIP SURGERY      OK ESOPHAGOGASTRODUODENOSCOPY TRANSORAL DIAGNOSTIC N/A 6/20/2018    Procedure: ESOPHAGOGASTRODUODENOSCOPY (EGD); Surgeon: Tylor Chandler MD;  Location: [facility];   Service: Gastroenterology    REPLACEMENT TOTAL KNEE Right      Social History   History   Alcohol Use No     History   Drug Use No     History   Smoking Status    Current Every Day Smoker    Packs/day: 1 00    Types: Cigarettes   Smokeless Tobacco    Never Used     Family History:   Family History   Problem Relation Age of Onset    Heart attack Mother     Hypertension Mother     Heart disease Father          Current Outpatient Prescriptions:     atorvastatin (LIPITOR) 10 mg tablet, Take 10 mg by mouth daily, Disp: , Rfl:     Calcium Citrate-Vitamin D (CALCIUM + D PO), Take by mouth, Disp: , Rfl:     esomeprazole (NexIUM) 40 mg packet, Take 40 mg by mouth every morning before breakfast, Disp: , Rfl:     glucosamine-chondroitin 500-400 MG tablet, Take 1 tablet by mouth 3 (three) times a day, Disp: , Rfl:     multivitamin (THERAGRAN) TABS, Take 1 tablet by mouth daily, Disp: , Rfl:     Psyllium (METAMUCIL FIBER PO), Take by mouth, Disp: , Rfl:     Tiotropium Bromide Monohydrate (SPIRIVA HANDIHALER IN), Inhale, Disp: , Rfl:     valsartan-hydrochlorothiazide (DIOVAN-HCT) 160-25 MG per tablet, Take 1 tablet by mouth daily Takes 80/125 , Disp: , Rfl:     citalopram (CeleXA) 20 mg tablet, Take 20 mg by mouth daily, Disp: , Rfl:     Iron-Vit C-Vit B12-Folic Acid (IRON 656 PLUS PO), Take by mouth, Disp: , Rfl:     No Known Allergies  @ ROS@  Physical Exam:  Vitals:    07/05/18 1000   BP: 116/80   BP Location: Left arm   Cuff Size: Adult   Pulse: 70   Resp: 17   Temp: 97 6 °F (36 4 °C)   TempSrc: Tympanic   SpO2: 91%   Weight: 73 5 kg (162 lb)   Height: 5' 1" (1 549 m)     Alert, oriented, not in distress, no icterus, no oral thrush, no palpable neck mass, clear lung fields, regular heart rate, systolic murmur, abdomen  soft and non tender, no palpable abdominal mass, no ascites, no edema of ankles, no calf tenderness, no focal neurological deficit, no skin rash, no palpable lymphadenopathy, good arterial pulses, no clubbing  No palpable breast mass  Patient is anxious  Performance status 1  A medical student Vianey Nguyen from [place] was in the room with me during examination  Lab Results: I have reviewed all pertinent labs  LABS:  Results for orders placed or performed in visit on 06/25/18   Comprehensive metabolic panel   Result Value Ref Range    Sodium 136 136 - 145 mmol/L    Potassium 3 5 3 5 - 5 3 mmol/L    Chloride 99 (L) 100 - 108 mmol/L    CO2 31 21 - 32 mmol/L    Anion Gap 6 4 - 13 mmol/L    BUN 16 5 - 25 mg/dL    Creatinine 0 69 0 60 - 1 30 mg/dL    Glucose, Fasting 84 65 - 99 mg/dL    Calcium 9 6 8 3 - 10 1 mg/dL    AST 16 5 - 45 U/L    ALT 18 12 - 78 U/L    Alkaline Phosphatase 98 46 - 116 U/L    Total Protein 7 0 6 4 - 8 2 g/dL    Albumin 3 4 (L) 3 5 - 5 0 g/dL    Total Bilirubin 0 50 0 20 - 1 00 mg/dL    eGFR 81 ml/min/1 73sq m         Imaging Studies: I have personally reviewed pertinent reports  Pathology, and Other Studies: I have personally reviewed pertinent reports  FINAL PATHOLOGIC DIAGNOSIS  Reported:  2/9/2015  A   Transverse colon polyp, biopsy:     - Colonic mucosa with focal adenomatous change      - Negative for high grade dysplasia and malignancy  B   Rectum, polyp, biopsy:     - Hyperplastic polyp      - Negative for dysplasia and malignancy      *Electronic Signature*   MARAH Valentin                      "This case, in its entirety, has been reviewed and   finalized by the pathologist whose signature appears on this report "     Interpretation performed at [address], [place] [address]  Surgical Pathology Report                         Case: [id number]                                    Authorizing Provider: Mathilda Duverney, MD           Collected:           06/20/2018 1033               Ordering Location:     [address]      Received:            06/20/2018 1710                                      [facility]                                                            Pathologist:           Kvng Echavarria MD                                                               Specimen:    Esophagogastric junction, r/o malignancy                                                   Final Diagnosis   A  Esophagogastric junction, r/o malignancy:  -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  -Submitted P53 stain is focally positive     Comment  This case was sent out to gastrointestinal pathology section at [facility] Pathology consultation Service and signed out by MARAH Clark  An expert of GI pathologist  The above mentioned diagnosis is exactly her consultation report diagnosis  A copy of this consultation report is on file at [facility] department of pathology  Please see her full consultation report in the notes section         A copy of this report was faxed to Dr Mathilda Duverney, MD office on 7/2/2018 at 9:50 AM   Electronically signed by Kvng Echavarria MD on 7/2/2018 at  9:47 AM   Preliminary result electronically signed by Kvng Echavarria MD on 6/25/2018 at  1:52 PM   Note   [address] [facility] DIAGNOSIS:     1  ESOPHAGOGASTRIC JUNCTION (BIOPSY, [id number], 06/20/2018); 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 DISTINCTIVE TYPE WITH HIGH GRADE  DYSPLASIA  SUBMITTED P53 IMMUNOSTAIN IS FOCALLY POSITiVE         IMPRESSION:     1   Large hiatal hernia containing much of the stomach    2  Subtle narrowing of the distal esophagus at the gastroesophageal junction, presumably representing the esophageal tumor identified endoscopically  3   No evidence of metastases in the chest or abdomen               Workstation performed: [id number]      Imaging     CT chest and abdomen w contrast (Order #[id number]) on 7/2/2018 - Imaging Information   Result History         Assessment and Plan:  Bhavesh Rodriguez is a 80y o  year old female   She is here with her sister  She was losing weight and over the last 3 months she last her between 20 and 25 lbs  There was some decrease in appetite  Some difficulty in swallowing at the lower end of esophagus  After patient had EGD and colonoscopy in  June 2018 she noticed improved swallowing and stabilization of weight  Appetite  has remained  fair  Patient has generalized weakness and tiredness and arthritic symptoms  Occasionally hoarseness  Cough with mucoid expectoration, wheezing and shortness of breath with exertion  Occasionally chest pain  Indigestion  Fullness in the upper abdomen  Occasionally constipation Nocturia  Occasionally low back discomfort     Physical examination and test results are as recorded and discussed  Patient has a large hiatal hernia, Pelaez's esophagus and intramucosal malignancy at GE junction  Maybe she had only superficial biopsy  I think she needs PET-CT scan and there is no regional lymphadenopathy and distant metastatic disease she would need EUS  I explained this to patient and her sister  See orders below  Patient has follow-up office appointment  1  Cancer of abdominal esophagus ([address] )    - NM PET CT skull base to mid thigh; Future  - CBC and differential; Future  - Comprehensive metabolic panel; Future  - CEA      Patient voiced understanding and agreement in the discussion  Counseling / Coordination of Care     Greater than 50% of total time was spent with the patient and / or family counseling and / or coordination of care

## 2018-07-06 NOTE — PROGRESS NOTES
Consultation - Medical Oncology   Barb Bower 80 y o  female MRN: [id number]  Unit/Bed#:  Encounter: [id number]    Referring physician:  Crystal Rios  Reason for Consult:  Cancer esophagus  HPI: Barb Bower is a 80y o  year old female   She is here with her sister  She was losing weight and over the last 3 months she last her between 20 and 25 lbs  There was some decrease in appetite  Some difficulty in swallowing at the lower end of esophagus  After patient had EGD and colonoscopy in  June 2018 she noticed improved swallowing and stabilization of weight  Appetite  has remained  fair  Patient has generalized weakness and tiredness and arthritic symptoms  Occasionally hoarseness  Cough with mucoid expectoration, wheezing and shortness of breath with exertion  Occasionally chest pain  Indigestion  Fullness in the upper abdomen  Occasionally constipation Nocturia  Occasionally low back discomfort  ROS:  07/05/18 Reviewed 13 systems:  Presently no headaches, seizures, dizziness, diplopia,  palpitations,  hemoptysis, abdominal pain, nausea, vomiting,  melena, hematuria, fever, chills, bleeding, bone pains, skin rash,  weakness, numbness, claudication and gait problem  No frequent infections  Not unusually sensitive to heat or cold  No swelling of the ankles  No swollen glands  Patient is anxious  No GYN symptoms Other symptoms are in HPI        Historical Information   Past Medical History:   Diagnosis Date    COPD (chronic obstructive pulmonary disease) ([facility] [address] )     Esophageal cancer (HCC)     Hyperlipidemia     Hypertension      Past Surgical History:   Procedure Laterality Date    HIP SURGERY      AR ESOPHAGOGASTRODUODENOSCOPY TRANSORAL DIAGNOSTIC N/A 6/20/2018    Procedure: ESOPHAGOGASTRODUODENOSCOPY (EGD); Surgeon: Crystal Rios MD;  Location: [facility];   Service: Gastroenterology    REPLACEMENT TOTAL KNEE Right      Social History   History   Alcohol Use No     History   Drug Use No     History   Smoking Status    Current Every Day Smoker    Packs/day: 1 00    Types: Cigarettes   Smokeless Tobacco    Never Used     Family History:   Family History   Problem Relation Age of Onset    Heart attack Mother     Hypertension Mother     Heart disease Father          Current Outpatient Prescriptions:     atorvastatin (LIPITOR) 10 mg tablet, Take 10 mg by mouth daily, Disp: , Rfl:     Calcium Citrate-Vitamin D (CALCIUM + D PO), Take by mouth, Disp: , Rfl:     esomeprazole (NexIUM) 40 mg packet, Take 40 mg by mouth every morning before breakfast, Disp: , Rfl:     glucosamine-chondroitin 500-400 MG tablet, Take 1 tablet by mouth 3 (three) times a day, Disp: , Rfl:     multivitamin (THERAGRAN) TABS, Take 1 tablet by mouth daily, Disp: , Rfl:     Psyllium (METAMUCIL FIBER PO), Take by mouth, Disp: , Rfl:     Tiotropium Bromide Monohydrate (SPIRIVA HANDIHALER IN), Inhale, Disp: , Rfl:     valsartan-hydrochlorothiazide (DIOVAN-HCT) 160-25 MG per tablet, Take 1 tablet by mouth daily Takes 80/125 , Disp: , Rfl:     citalopram (CeleXA) 20 mg tablet, Take 20 mg by mouth daily, Disp: , Rfl:     Iron-Vit C-Vit B12-Folic Acid (IRON 164 PLUS PO), Take by mouth, Disp: , Rfl:     No Known Allergies  @ ROS@  Physical Exam:  Vitals:    07/05/18 1000   BP: 116/80   BP Location: Left arm   Cuff Size: Adult   Pulse: 70   Resp: 17   Temp: 97 6 °F (36 4 °C)   TempSrc: Tympanic   SpO2: 91%   Weight: 73 5 kg (162 lb)   Height: 5' 1" (1 549 m)     Alert, oriented, not in distress, no icterus, no oral thrush, no palpable neck mass, clear lung fields, regular heart rate, systolic murmur, abdomen  soft and non tender, no palpable abdominal mass, no ascites, no edema of ankles, no calf tenderness, no focal neurological deficit, no skin rash, no palpable lymphadenopathy, good arterial pulses, no clubbing  No palpable breast mass  Patient is anxious  Performance status 1    A medical student [facility] Patient from [place] was in the room with me during examination  Lab Results: I have reviewed all pertinent labs  LABS:  Results for orders placed or performed in visit on 06/25/18   Comprehensive metabolic panel   Result Value Ref Range    Sodium 136 136 - 145 mmol/L    Potassium 3 5 3 5 - 5 3 mmol/L    Chloride 99 (L) 100 - 108 mmol/L    CO2 31 21 - 32 mmol/L    Anion Gap 6 4 - 13 mmol/L    BUN 16 5 - 25 mg/dL    Creatinine 0 69 0 60 - 1 30 mg/dL    Glucose, Fasting 84 65 - 99 mg/dL    Calcium 9 6 8 3 - 10 1 mg/dL    AST 16 5 - 45 U/L    ALT 18 12 - 78 U/L    Alkaline Phosphatase 98 46 - 116 U/L    Total Protein 7 0 6 4 - 8 2 g/dL    Albumin 3 4 (L) 3 5 - 5 0 g/dL    Total Bilirubin 0 50 0 20 - 1 00 mg/dL    eGFR 81 ml/min/1 73sq m         Imaging Studies: I have personally reviewed pertinent reports  Pathology, and Other Studies: I have personally reviewed pertinent reports  FINAL PATHOLOGIC DIAGNOSIS  Reported:  2/9/2015  A   Transverse colon polyp, biopsy:     - Colonic mucosa with focal adenomatous change      - Negative for high grade dysplasia and malignancy  B   Rectum, polyp, biopsy:     - Hyperplastic polyp      - Negative for dysplasia and malignancy      *Electronic Signature*   MARAH Cote                      "This case, in its entirety, has been reviewed and   finalized by the pathologist whose signature appears on this report "     Interpretation performed at [facility], [address] [place] [address]       Surgical Pathology Report                         Case: [id number]                                    Authorizing Provider: Olivia Seo MD           Collected:           06/20/2018 1033               Ordering Location:     [address] [place] [address]      Received:            06/20/2018 [address] [place] [address] Endoscopy                                                            Pathologist:           Gary Ochoa MD                                                               Specimen:    Esophagogastric junction, r/o malignancy                                                   Final Diagnosis   A  Esophagogastric junction, r/o malignancy:  -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  -Submitted P53 stain is focally positive     Comment  This case was sent out to gastrointestinal pathology section at [facility] Pathology consultation Service and signed out by MARAH Lipscomb  An expert of GI pathologist  The above mentioned diagnosis is exactly her consultation report diagnosis  A copy of this consultation report is on file at [facility] department of pathology  Please see her full consultation report in the notes section         A copy of this report was faxed to Dr Jeannie Chao MD office on 7/2/2018 at 9:50 AM   Electronically signed by Vinod Tubbs MD on 7/2/2018 at  9:47 AM   Preliminary result electronically signed by Vinod Tubbs MD on 6/25/2018 at  1:52 PM   Note   [address] [facility] DIAGNOSIS:     1  ESOPHAGOGASTRIC JUNCTION (BIOPSY, [id number], 06/20/2018); 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 DISTINCTIVE TYPE WITH HIGH GRADE  DYSPLASIA  SUBMITTED P53 IMMUNOSTAIN IS FOCALLY POSITiVE         IMPRESSION:     1   Large hiatal hernia containing much of the stomach  2   Subtle narrowing of the distal esophagus at the gastroesophageal junction, presumably representing the esophageal tumor identified endoscopically  3   No evidence of metastases in the chest or abdomen               Workstation performed: [id number]      Imaging     CT chest and abdomen w contrast (Order #[id number]) on 7/2/2018 - Imaging Information   Result History         Assessment and Plan:  La Bowman is a 80y o  year old female   She is here with her sister  She was losing weight and over the last 3 months she last her between 20 and 25 lbs    There was some decrease in appetite  Some difficulty in swallowing at the lower end of esophagus  After patient had EGD and colonoscopy in  June 2018 she noticed improved swallowing and stabilization of weight  Appetite  has remained  fair  Patient has generalized weakness and tiredness and arthritic symptoms  Occasionally hoarseness  Cough with mucoid expectoration, wheezing and shortness of breath with exertion  Occasionally chest pain  Indigestion  Fullness in the upper abdomen  Occasionally constipation Nocturia  Occasionally low back discomfort     Physical examination and test results are as recorded and discussed  Patient has a large hiatal hernia, Pelaez's esophagus and intramucosal malignancy at GE junction  Maybe she had only superficial biopsy  I think she needs PET-CT scan and there is no regional lymphadenopathy and distant metastatic disease she would need EUS  I explained this to patient and her sister  See orders below  Patient has follow-up office appointment  1  Cancer of abdominal esophagus ([address] )    - NM PET CT skull base to mid thigh; Future  - CBC and differential; Future  - Comprehensive metabolic panel; Future  - CEA      Patient voiced understanding and agreement in the discussion  Counseling / Coordination of Care    Greater than 50% of total time was spent with the patient and / or family counseling and / or coordination of care

## 2018-07-18 ENCOUNTER — APPOINTMENT (OUTPATIENT)
Dept: LAB | Age: 83
End: 2018-07-18
Payer: COMMERCIAL

## 2018-07-18 ENCOUNTER — HOSPITAL ENCOUNTER (OUTPATIENT)
Dept: RADIOLOGY | Age: 83
Discharge: HOME | End: 2018-07-18
Payer: COMMERCIAL

## 2018-07-18 ENCOUNTER — TRANSCRIBE ORDERS (OUTPATIENT)
Dept: LAB | Age: 83
End: 2018-07-18

## 2018-07-18 ENCOUNTER — TRANSCRIBE ORDERS (OUTPATIENT)
Dept: ADMINISTRATIVE | Age: 83
End: 2018-07-18

## 2018-07-18 DIAGNOSIS — C15.5 CANCER OF ABDOMINAL ESOPHAGUS (HCC): ICD-10-CM

## 2018-07-18 DIAGNOSIS — C15.5 CANCER OF ABDOMINAL ESOPHAGUS (HCC): Primary | ICD-10-CM

## 2018-07-18 LAB
ALBUMIN SERPL BCP-MCNC: 3.6 G/DL (ref 3.5–5)
ALP SERPL-CCNC: 103 U/L (ref 46–116)
ALT SERPL W P-5'-P-CCNC: 15 U/L (ref 12–78)
ANION GAP SERPL CALCULATED.3IONS-SCNC: 5 MMOL/L (ref 4–13)
AST SERPL W P-5'-P-CCNC: 16 U/L (ref 5–45)
BASOPHILS # BLD AUTO: 0.04 THOUSANDS/ΜL (ref 0–0.1)
BASOPHILS NFR BLD AUTO: 1 % (ref 0–1)
BILIRUB SERPL-MCNC: 0.44 MG/DL (ref 0.2–1)
BUN SERPL-MCNC: 11 MG/DL (ref 5–25)
CALCIUM SERPL-MCNC: 9.5 MG/DL (ref 8.3–10.1)
CEA SERPL-MCNC: 23.5 NG/ML (ref 0–3)
CHLORIDE SERPL-SCNC: 99 MMOL/L (ref 100–108)
CO2 SERPL-SCNC: 30 MMOL/L (ref 21–32)
CREAT SERPL-MCNC: 0.7 MG/DL (ref 0.6–1.3)
EOSINOPHIL # BLD AUTO: 0.15 THOUSAND/ΜL (ref 0–0.61)
EOSINOPHIL NFR BLD AUTO: 2 % (ref 0–6)
ERYTHROCYTE [DISTWIDTH] IN BLOOD BY AUTOMATED COUNT: 12.8 % (ref 11.6–15.1)
GFR SERPL CREATININE-BSD FRML MDRD: 81 ML/MIN/1.73SQ M
GLUCOSE P FAST SERPL-MCNC: 93 MG/DL (ref 65–99)
GLUCOSE SERPL-MCNC: 92 MG/DL (ref 65–140)
HCT VFR BLD AUTO: 43.6 % (ref 34.8–46.1)
HGB BLD-MCNC: 13.8 G/DL (ref 11.5–15.4)
IMM GRANULOCYTES # BLD AUTO: 0.02 THOUSAND/UL (ref 0–0.2)
IMM GRANULOCYTES NFR BLD AUTO: 0 % (ref 0–2)
LYMPHOCYTES # BLD AUTO: 1.76 THOUSANDS/ΜL (ref 0.6–4.47)
LYMPHOCYTES NFR BLD AUTO: 25 % (ref 14–44)
MCH RBC QN AUTO: 29.1 PG (ref 26.8–34.3)
MCHC RBC AUTO-ENTMCNC: 31.7 G/DL (ref 31.4–37.4)
MCV RBC AUTO: 92 FL (ref 82–98)
MONOCYTES # BLD AUTO: 0.6 THOUSAND/ΜL (ref 0.17–1.22)
MONOCYTES NFR BLD AUTO: 9 % (ref 4–12)
NEUTROPHILS # BLD AUTO: 4.41 THOUSANDS/ΜL (ref 1.85–7.62)
NEUTS SEG NFR BLD AUTO: 63 % (ref 43–75)
NRBC BLD AUTO-RTO: 0 /100 WBCS
PLATELET # BLD AUTO: 356 THOUSANDS/UL (ref 149–390)
PMV BLD AUTO: 11.3 FL (ref 8.9–12.7)
POTASSIUM SERPL-SCNC: 3.9 MMOL/L (ref 3.5–5.3)
PROT SERPL-MCNC: 7.6 G/DL (ref 6.4–8.2)
RBC # BLD AUTO: 4.75 MILLION/UL (ref 3.81–5.12)
SODIUM SERPL-SCNC: 134 MMOL/L (ref 136–145)
WBC # BLD AUTO: 6.98 THOUSAND/UL (ref 4.31–10.16)

## 2018-07-18 PROCEDURE — 80053 COMPREHEN METABOLIC PANEL: CPT

## 2018-07-18 PROCEDURE — 85025 COMPLETE CBC W/AUTO DIFF WBC: CPT

## 2018-07-18 PROCEDURE — 82948 REAGENT STRIP/BLOOD GLUCOSE: CPT

## 2018-07-18 PROCEDURE — 36415 COLL VENOUS BLD VENIPUNCTURE: CPT | Performed by: INTERNAL MEDICINE

## 2018-07-18 PROCEDURE — 78815 PET IMAGE W/CT SKULL-THIGH: CPT

## 2018-07-18 PROCEDURE — 82378 CARCINOEMBRYONIC ANTIGEN: CPT

## 2018-07-18 PROCEDURE — A9552 F18 FDG: HCPCS

## 2018-07-18 RX ADMIN — IOHEXOL 5 ML: 240 INJECTION, SOLUTION INTRATHECAL; INTRAVASCULAR; INTRAVENOUS; ORAL at 14:10

## 2018-07-20 ENCOUNTER — TELEPHONE (OUTPATIENT)
Dept: HEMATOLOGY ONCOLOGY | Facility: CLINIC | Age: 83
End: 2018-07-20

## 2018-07-20 NOTE — TELEPHONE ENCOUNTER
Carmelita called with a question  She gave her "after visit summary to her family doctor  Asking if she needed to bring it with her for her visit with Dr Tej Hoover  Assured her she does not need that paper for her visit

## 2018-07-25 ENCOUNTER — OFFICE VISIT (OUTPATIENT)
Dept: LAB | Facility: HOSPITAL | Age: 83
End: 2018-07-25
Attending: INTERNAL MEDICINE
Payer: COMMERCIAL

## 2018-07-25 ENCOUNTER — OFFICE VISIT (OUTPATIENT)
Dept: HEMATOLOGY ONCOLOGY | Facility: CLINIC | Age: 83
End: 2018-07-25
Payer: COMMERCIAL

## 2018-07-25 VITALS
BODY MASS INDEX: 30.21 KG/M2 | DIASTOLIC BLOOD PRESSURE: 74 MMHG | HEART RATE: 74 BPM | WEIGHT: 160 LBS | RESPIRATION RATE: 17 BRPM | TEMPERATURE: 97.4 F | HEIGHT: 61 IN | SYSTOLIC BLOOD PRESSURE: 112 MMHG | OXYGEN SATURATION: 96 %

## 2018-07-25 DIAGNOSIS — C15.5 CANCER OF ABDOMINAL ESOPHAGUS (HCC): ICD-10-CM

## 2018-07-25 DIAGNOSIS — C15.5 CANCER OF ABDOMINAL ESOPHAGUS (HCC): Primary | ICD-10-CM

## 2018-07-25 LAB
ATRIAL RATE: 69 BPM
P AXIS: 79 DEGREES
PR INTERVAL: 198 MS
QRS AXIS: 61 DEGREES
QRSD INTERVAL: 84 MS
QT INTERVAL: 388 MS
QTC INTERVAL: 415 MS
T WAVE AXIS: 53 DEGREES
VENTRICULAR RATE: 69 BPM

## 2018-07-25 PROCEDURE — 93010 ELECTROCARDIOGRAM REPORT: CPT | Performed by: INTERNAL MEDICINE

## 2018-07-25 PROCEDURE — 93005 ELECTROCARDIOGRAM TRACING: CPT

## 2018-07-25 PROCEDURE — 99214 OFFICE O/P EST MOD 30 MIN: CPT | Performed by: INTERNAL MEDICINE

## 2018-07-25 RX ORDER — IRBESARTAN 75 MG/1
75 TABLET ORAL DAILY
COMMUNITY

## 2018-07-25 NOTE — LETTER
July 25, 2018     Shante Escobar Dietrich [address] [place] [address]    Patient: Marco Antonio Shields   YOB: 1935   Date of Visit: 7/25/2018       Dear Dr Cifuentes Deep: Thank you for referring Efrain Solorio to me for evaluation  Below are my notes for this consultation  If you have questions, please do not hesitate to call me  I look forward to following your patient along with you  Sincerely,        Kimberlyn English MD        CC: MD Maggie Cage MD Karle Boroughs, MD Cephas Sides, MD Camellia Andes, MD  7/25/2018  2:32 PM  Sign at close encounter    HPI:     Patient is here with her sister  Follow-up visit for carcinoma of distal esophagus / proximal abdomen, GE junction  She has some dysphagia  She has lost   25 lb in last 3 months  Decreased appetite  Patient has generalized weakness and tiredness and arthritic symptoms  Occasionally hoarseness  Cough with mucoid expectoration, wheezing and shortness of breath with exertion  Occasionally chest pain  Indigestion  Fullness in the upper abdomen  Occasionally constipation Nocturia  Occasionally low back discomfort  Workup showed a large hiatal hernia, Pelaez's esophagus and intramucosal malignancy at GE junction  Maybe she had only superficial biopsy        On PET-CT scan  there is no regional lymphadenopathy and distant metastatic disease         Current Outpatient Prescriptions:     atorvastatin (LIPITOR) 10 mg tablet, Take 10 mg by mouth daily, Disp: , Rfl:     Calcium Citrate-Vitamin D (CALCIUM + D PO), Take by mouth, Disp: , Rfl:     citalopram (CeleXA) 20 mg tablet, Take 20 mg by mouth daily, Disp: , Rfl:     esomeprazole (NexIUM) 40 mg packet, Take 40 mg by mouth every morning before breakfast, Disp: , Rfl:     glucosamine-chondroitin 500-400 MG tablet, Take 1 tablet by mouth 3 (three) times a day, Disp: , Rfl:     irbesartan (AVAPRO) 75 mg tablet, Take 75 mg by mouth daily, Disp: , Rfl:     Iron-Vit C-Vit B12-Folic Acid (IRON 716 PLUS PO), Take by mouth, Disp: , Rfl:     multivitamin (THERAGRAN) TABS, Take 1 tablet by mouth daily, Disp: , Rfl:     Psyllium (METAMUCIL FIBER PO), Take by mouth, Disp: , Rfl:     Tiotropium Bromide Monohydrate (SPIRIVA HANDIHALER IN), Inhale, Disp: , Rfl:     No Known Allergies     No history exists  ROS:  07/25/18 Reviewed 13 systems:  Presently no headaches, seizures, dizziness, diplopia,  chest pain, palpitations,  hemoptysis, abdominal pain, nausea, vomiting,  melena, hematuria, fever, chills, bleeding, bone pains, skin rash,   numbness,  claudication and gait problem  No frequent infections  Not unusually sensitive to heat or cold  No swelling of the ankles  No swollen glands  Patient is anxious  Other symptoms are in HPI        /74 (BP Location: Left arm, Patient Position: Sitting, Cuff Size: Standard)   Pulse 74   Temp (!) 97 4 °F (36 3 °C) (Tympanic)   Resp 17   Ht 5' 1" (1 549 m)   Wt 72 6 kg (160 lb)   SpO2 96%   BMI 30 23 kg/m²      Physical Exam:  Alert, oriented, not in distress, no icterus, no oral thrush, no palpable neck mass, clear lung fields, regular heart rate, systolic murmur, abdomen  soft and non tender, no palpable abdominal mass, no ascites, no edema of ankles, no calf tenderness, no focal neurological deficit, no skin rash, no palpable lymphadenopathy, good arterial pulses, no clubbing  Patient is anxious  Performance status 1  IMAGING:     IMPRESSION:     1  Prominent focal FDG uptake at the anterior wall of the distal esophagus/proximal stomach compatible with hypermetabolic malignancy    2   No findings for hypermetabolic metastasis    LABS:  Results for orders placed or performed during the hospital encounter of 07/18/18   Fingerstick Glucose (POCT)   Result Value Ref Range    POC Glucose 92 65 - 140 mg/dl     Labs, Imaging, & Other studies:   All pertinent labs and imaging studies were personally reviewed    Lab Results   Component Value Date     (L) 07/18/2018    K 3 9 07/18/2018    CL 99 (L) 07/18/2018    CO2 30 07/18/2018    ANIONGAP 5 07/18/2018    BUN 11 07/18/2018    CREATININE 0 70 07/18/2018    GLUCOSE 106 06/18/2017    GLUF 93 07/18/2018    CALCIUM 9 5 07/18/2018    AST 16 07/18/2018    ALT 15 07/18/2018    ALKPHOS 103 07/18/2018    PROT 7 6 07/18/2018    BILITOT 0 44 07/18/2018    EGFR 81 07/18/2018     Lab Results   Component Value Date    WBC 6 98 07/18/2018    HGB 13 8 07/18/2018    HCT 43 6 07/18/2018    MCV 92 07/18/2018     07/18/2018       Reviewed and discussed with patient  Assessment and plan:   Patient is here with her sister  Follow-up visit for carcinoma of distal esophagus / proximal abdomen, GE junction  She has some dysphagia  She has lost   25 lb in last 3 months  Decreased appetite  Patient has generalized weakness and tiredness and arthritic symptoms  Occasionally hoarseness  Cough with mucoid expectoration, wheezing and shortness of breath with exertion  Occasionally chest pain  Indigestion  Fullness in the upper abdomen  Occasionally constipation Nocturia  Occasionally low back discomfort  Workup showed a large hiatal hernia, Pelaez's esophagus and intramucosal malignancy at GE junction  Maybe she had only superficial biopsy     On PET-CT scan  there is no regional lymphadenopathy and distant metastatic disease    I have arranged for the patient to have radiation consultation, GI consultation for EGD, repeat biopsy and EUS  She will have surgical consultation  She may not be a best candidate for surgery because of her chronic lung disease and she continues to smoke cigarettes even though she is trying to quit  Also tumor size is 5 6 cm  I am leaning more towards carboplatin plus Taxol plus radiation  I have explained all this to the patient and her sister  She has  high CEA 23 5  She already had blood counts and chemistry  She will have EKG  She will come back next week  All discussed in detail  Questions answered  1  Cancer of abdominal esophagus (HCC)    - ECG 12 lead; Future  - Ambulatory referral to Gastroenterology; Future  - Ambulatory referral to Radiation Oncology; Future  - Ambulatory referral to Surgical Oncology; Future      Patient voiced understanding and agreement in the discussion  Counseling / Coordination of Care   Greater than 50% of total time was spent with the patient and / or family counseling and / or coordination of care

## 2018-07-25 NOTE — PROGRESS NOTES
HPI:     Patient is here with her sister  Follow-up visit for carcinoma of distal esophagus / proximal abdomen, GE junction  She has some dysphagia  She has lost   25 lb in last 3 months  Decreased appetite  Patient has generalized weakness and tiredness and arthritic symptoms  Occasionally hoarseness  Cough with mucoid expectoration, wheezing and shortness of breath with exertion  Occasionally chest pain  Indigestion  Fullness in the upper abdomen  Occasionally constipation Nocturia  Occasionally low back discomfort  Workup showed a large hiatal hernia, Pelaez's esophagus and intramucosal malignancy at GE junction  Maybe she had only superficial biopsy     On PET-CT scan  there is no regional lymphadenopathy and distant metastatic disease         Current Outpatient Prescriptions:     atorvastatin (LIPITOR) 10 mg tablet, Take 10 mg by mouth daily, Disp: , Rfl:     Calcium Citrate-Vitamin D (CALCIUM + D PO), Take by mouth, Disp: , Rfl:     citalopram (CeleXA) 20 mg tablet, Take 20 mg by mouth daily, Disp: , Rfl:     esomeprazole (NexIUM) 40 mg packet, Take 40 mg by mouth every morning before breakfast, Disp: , Rfl:     glucosamine-chondroitin 500-400 MG tablet, Take 1 tablet by mouth 3 (three) times a day, Disp: , Rfl:     irbesartan (AVAPRO) 75 mg tablet, Take 75 mg by mouth daily, Disp: , Rfl:     Iron-Vit C-Vit B12-Folic Acid (IRON 455 PLUS PO), Take by mouth, Disp: , Rfl:     multivitamin (THERAGRAN) TABS, Take 1 tablet by mouth daily, Disp: , Rfl:     Psyllium (METAMUCIL FIBER PO), Take by mouth, Disp: , Rfl:     Tiotropium Bromide Monohydrate (SPIRIVA HANDIHALER IN), Inhale, Disp: , Rfl:     No Known Allergies     No history exists         ROS:  07/25/18 Reviewed 13 systems:  Presently no headaches, seizures, dizziness, diplopia,  chest pain, palpitations,  hemoptysis, abdominal pain, nausea, vomiting,  melena, hematuria, fever, chills, bleeding, bone pains, skin rash,   numbness, claudication and gait problem  No frequent infections  Not unusually sensitive to heat or cold  No swelling of the ankles  No swollen glands  Patient is anxious  Other symptoms are in HPI        /74 (BP Location: Left arm, Patient Position: Sitting, Cuff Size: Standard)   Pulse 74   Temp (!) 97 4 °F (36 3 °C) (Tympanic)   Resp 17   Ht 5' 1" (1 549 m)   Wt 72 6 kg (160 lb)   SpO2 96%   BMI 30 23 kg/m²     Physical Exam:  Alert, oriented, not in distress, no icterus, no oral thrush, no palpable neck mass, clear lung fields, regular heart rate, systolic murmur, abdomen  soft and non tender, no palpable abdominal mass, no ascites, no edema of ankles, no calf tenderness, no focal neurological deficit, no skin rash, no palpable lymphadenopathy, good arterial pulses, no clubbing  Patient is anxious  Performance status 1  IMAGING:     IMPRESSION:     1  Prominent focal FDG uptake at the anterior wall of the distal esophagus/proximal stomach compatible with hypermetabolic malignancy    2   No findings for hypermetabolic metastasis    LABS:  Results for orders placed or performed during the hospital encounter of 07/18/18   Fingerstick Glucose (POCT)   Result Value Ref Range    POC Glucose 92 65 - 140 mg/dl     Labs, Imaging, & Other studies:   All pertinent labs and imaging studies were personally reviewed    Lab Results   Component Value Date     (L) 07/18/2018    K 3 9 07/18/2018    CL 99 (L) 07/18/2018    CO2 30 07/18/2018    ANIONGAP 5 07/18/2018    BUN 11 07/18/2018    CREATININE 0 70 07/18/2018    GLUCOSE 106 06/18/2017    GLUF 93 07/18/2018    CALCIUM 9 5 07/18/2018    AST 16 07/18/2018    ALT 15 07/18/2018    ALKPHOS 103 07/18/2018    PROT 7 6 07/18/2018    BILITOT 0 44 07/18/2018    EGFR 81 07/18/2018     Lab Results   Component Value Date    WBC 6 98 07/18/2018    HGB 13 8 07/18/2018    HCT 43 6 07/18/2018    MCV 92 07/18/2018     07/18/2018       Reviewed and discussed with patient  Assessment and plan:   Patient is here with her sister  Follow-up visit for carcinoma of distal esophagus / proximal abdomen, GE junction  She has some dysphagia  She has lost   25 lb in last 3 months  Decreased appetite  Patient has generalized weakness and tiredness and arthritic symptoms  Occasionally hoarseness  Cough with mucoid expectoration, wheezing and shortness of breath with exertion  Occasionally chest pain  Indigestion  Fullness in the upper abdomen  Occasionally constipation Nocturia  Occasionally low back discomfort  Workup showed a large hiatal hernia, Pelaez's esophagus and intramucosal malignancy at GE junction  Maybe she had only superficial biopsy     On PET-CT scan  there is no regional lymphadenopathy and distant metastatic disease    I have arranged for the patient to have radiation consultation, GI consultation for EGD, repeat biopsy and EUS  She will have surgical consultation  She may not be a best candidate for surgery because of her chronic lung disease and she continues to smoke cigarettes even though she is trying to quit  Also tumor size is 5 6 cm  I am leaning more towards carboplatin plus Taxol plus radiation  I have explained all this to the patient and her sister  She has  high CEA 23 5  She already had blood counts and chemistry  She will have EKG  She will come back next week  All discussed in detail  Questions answered  1  Cancer of abdominal esophagus (HCC)    - ECG 12 lead; Future  - Ambulatory referral to Gastroenterology; Future  - Ambulatory referral to Radiation Oncology; Future  - Ambulatory referral to Surgical Oncology; Future      Patient voiced understanding and agreement in the discussion  Counseling / Coordination of Care   Greater than 50% of total time was spent with the patient and / or family counseling and / or coordination of care

## 2018-07-26 ENCOUNTER — DOCUMENTATION (OUTPATIENT)
Dept: SURGICAL ONCOLOGY | Facility: CLINIC | Age: 83
End: 2018-07-26

## 2018-07-26 ENCOUNTER — TELEPHONE (OUTPATIENT)
Dept: HEMATOLOGY ONCOLOGY | Facility: CLINIC | Age: 83
End: 2018-07-26

## 2018-07-26 NOTE — PROGRESS NOTES
GI Oncology Nurse Navigator Note:    I spoke to patient's sister Ashley Dye this morning  She called as she needs to make an appt, with Dr Pham Cardoso (Private GI Physician [phone]) appt, and also for an EUS and EGD  Glenn Cerda is going to call office back to schedule these appts  She also wanted info on [employer] that is out there  I mentioned I will get them involved with Calderon HAYDEN at [facility]  She will be in contact with them by next week  I also filled out the [place] over the phone, to get that started  She mentioned that she has no transportation  Glenn Cerda is trying to arrange to come to live with Carmelita during this time  As Oral Ibarra is  and Glenn Cerda is from [place]  Glenn Cerda is aware I will update them sometime next week, if Carmelita gets qualified to use their service  Glenn Cerda appreciated all the help and guidance  She is aware I am sending them the copy of the [employer] forms I filled out over the phone via mail, which I sent my business card as well  She will call me PRN

## 2018-07-27 ENCOUNTER — ANESTHESIA EVENT (OUTPATIENT)
Dept: GASTROENTEROLOGY | Facility: HOSPITAL | Age: 83
End: 2018-07-27
Payer: COMMERCIAL

## 2018-07-27 ENCOUNTER — ANESTHESIA (OUTPATIENT)
Dept: GASTROENTEROLOGY | Facility: HOSPITAL | Age: 83
End: 2018-07-27
Payer: COMMERCIAL

## 2018-07-27 ENCOUNTER — HOSPITAL ENCOUNTER (OUTPATIENT)
Facility: HOSPITAL | Age: 83
Setting detail: OUTPATIENT SURGERY
Discharge: HOME | End: 2018-07-27
Attending: INTERNAL MEDICINE | Admitting: INTERNAL MEDICINE
Payer: COMMERCIAL

## 2018-07-27 VITALS
OXYGEN SATURATION: 98 % | RESPIRATION RATE: 18 BRPM | TEMPERATURE: 97.6 F | WEIGHT: 160 LBS | BODY MASS INDEX: 30.21 KG/M2 | SYSTOLIC BLOOD PRESSURE: 176 MMHG | HEART RATE: 60 BPM | HEIGHT: 61 IN | DIASTOLIC BLOOD PRESSURE: 78 MMHG

## 2018-07-27 DIAGNOSIS — C15.9 MALIGNANT NEOPLASM OF ESOPHAGUS, UNSPECIFIED LOCATION (HCC): ICD-10-CM

## 2018-07-27 PROCEDURE — 88305 TISSUE EXAM BY PATHOLOGIST: CPT | Performed by: PATHOLOGY

## 2018-07-27 PROCEDURE — 88313 SPECIAL STAINS GROUP 2: CPT | Performed by: PATHOLOGY

## 2018-07-27 RX ORDER — ONDANSETRON 2 MG/ML
4 INJECTION INTRAMUSCULAR; INTRAVENOUS EVERY 6 HOURS PRN
Status: DISCONTINUED | OUTPATIENT
Start: 2018-07-27 | End: 2018-07-27 | Stop reason: HOSPADM

## 2018-07-27 RX ORDER — PROPOFOL 10 MG/ML
INJECTION, EMULSION INTRAVENOUS AS NEEDED
Status: DISCONTINUED | OUTPATIENT
Start: 2018-07-27 | End: 2018-07-27 | Stop reason: SURG

## 2018-07-27 RX ORDER — SODIUM CHLORIDE 9 MG/ML
50 INJECTION, SOLUTION INTRAVENOUS CONTINUOUS
Status: DISCONTINUED | OUTPATIENT
Start: 2018-07-27 | End: 2018-07-27 | Stop reason: HOSPADM

## 2018-07-27 RX ADMIN — PROPOFOL 30 MG: 10 INJECTION, EMULSION INTRAVENOUS at 13:36

## 2018-07-27 RX ADMIN — SODIUM CHLORIDE 50 ML/HR: 0.9 INJECTION, SOLUTION INTRAVENOUS at 12:44

## 2018-07-27 RX ADMIN — PROPOFOL 30 MG: 10 INJECTION, EMULSION INTRAVENOUS at 13:42

## 2018-07-27 RX ADMIN — PROPOFOL 30 MG: 10 INJECTION, EMULSION INTRAVENOUS at 13:30

## 2018-07-27 RX ADMIN — PROPOFOL 10 MG: 10 INJECTION, EMULSION INTRAVENOUS at 13:47

## 2018-07-27 RX ADMIN — LIDOCAINE HYDROCHLORIDE 100 MG: 20 INJECTION, SOLUTION INTRAVENOUS at 13:16

## 2018-07-27 RX ADMIN — PROPOFOL 30 MG: 10 INJECTION, EMULSION INTRAVENOUS at 13:24

## 2018-07-27 RX ADMIN — PROPOFOL 30 MG: 10 INJECTION, EMULSION INTRAVENOUS at 13:20

## 2018-07-27 RX ADMIN — ONDANSETRON 4 MG: 2 INJECTION, SOLUTION INTRAMUSCULAR; INTRAVENOUS at 14:44

## 2018-07-27 RX ADMIN — PROPOFOL 100 MG: 10 INJECTION, EMULSION INTRAVENOUS at 13:16

## 2018-07-27 NOTE — ANESTHESIA POSTPROCEDURE EVALUATION
Post-Op Assessment Note      CV Status:  Stable    Mental Status:  Alert and awake    Hydration Status:  Euvolemic    PONV Controlled:  Controlled    Airway Patency:  Patent and adequate    Post Op Vitals Reviewed: Yes          Staff: CRNA           BP   157/90   Temp      Pulse  78   Resp   15   SpO2   95T5

## 2018-07-27 NOTE — ANESTHESIA PREPROCEDURE EVALUATION
Review of Systems/Medical History  Patient summary reviewed  Chart reviewed  No history of anesthetic complications     Cardiovascular  EKG reviewed, Exercise tolerance (METS): >4,  Hyperlipidemia, Hypertension controlled,    Pulmonary  COPD moderate- medication dependent ,        GI/Hepatic    GERD well controlled,  Hiatal hernia,        Negative  ROS        Endo/Other  Negative endo/other ROS      GYN  Negative gynecology ROS          Hematology  Negative hematology ROS      Musculoskeletal  Negative musculoskeletal ROS        Neurology  Negative neurology ROS      Psychology   Negative psychology ROS              Physical Exam    Airway    Mallampati score: II  TM Distance: >3 FB  Neck ROM: full     Dental   upper dentures and lower dentures,     Cardiovascular  Rhythm: regular, No murmur,     Pulmonary  Breath sounds clear to auscultation,     Other Findings    3/12/18 SPECT:  SUMMARY:  -  Stress results: There was no chest pain during stress  -  ECG conclusions: The stress ECG was non-diagnostic for ischemia  -  Perfusion imaging: There were no perfusion defects   -  Gated SPECT: The calculated left ventricular ejection fraction was 81 %  Left ventricular ejection fraction was within normal limits by visual estimate  There was no left ventricular regional abnormality      IMPRESSIONS: Normal study after pharmacologic vasodilation  Myocardial perfusion imaging was normal at rest and with stress  Left ventricular systolic function was normal       3/12/18 TTE:  LEFT VENTRICLE:  Systolic function was normal  Ejection fraction was estimated to be 60 %  There were no regional wall motion abnormalities  Wall thickness was mildly increased  The changes were consistent with concentric remodeling (increased wall thickness with normal wall mass)    Left ventricular diastolic function parameters were normal      RIGHT VENTRICLE:  The size was normal   Systolic function was normal      LEFT ATRIUM:  The atrium was mildly dilated  Anesthesia Plan  ASA Score- 2     Anesthesia Type- IV sedation with anesthesia with ASA Monitors  Additional Monitors:   Airway Plan:         Plan Factors-    Induction- intravenous  Postoperative Plan-     Informed Consent- Anesthetic plan and risks discussed with patient

## 2018-07-27 NOTE — OP NOTE
**** GI/ENDOSCOPY REPORT ****     PATIENT NAME: Kathy Saravia - VISIT ID:  Patient ID: [id number]   YOB: 1935     INTRODUCTION: Upper Endoscopic [ULTRASOUND] - A 80 female patient presents   for an outpatient Upper Endoscopic [ULTRASOUND] at [facility]  INDICATIONS: Tumor of the diatal esophagus and cardia  CONSENT: The benefits, risks, and alternatives to the procedure were   discussed and informed consent was obtained from the patient  PREPARATION:  EKG, pulse, pulse oximetry and blood pressure were monitored   throughout the procedure  MEDICATIONS: Anesthesia-check records     PROCEDURE:  The endoscope was passed without difficulty under direct   visualization and advanced to the 2nd portion of the duodenum  The scope   was withdrawn and the mucosa was carefully examined  The views were good  The patient's toleration of the procedure was good  FINDINGS: Visual Exam:   Esophagus: The EG junction was located at 33 cm  There was a circumferential mass at the level of the EG junction  The   endoscope has passed with resistanece  The distal esophagus was normal    There was no evidence of Pelaez's esophagus in the esophagus  Stomach: There was a nodular tumor, that measured 3-4 cm in size, which occupied 75   to 99% of the circumference of the cardia  It was causing friable   narrowing  Multiple cold forceps biopsies was taken  The specimens were   collected for pathology (jar 1)  There was a 7 cm hiatus hernia visible in   the stomach  Otherwise, the stomach appeared to be normal   Duodenum: The   duodenum appeared to be normal  EUS EXAM:  There was a wall thickenning at   the distal 1-2 cm of the esophagus and the EG junction  At the EG junction   the esophageal wall measured about 1 12 cm in thickness  There was a mass   arising from the cardia  The mass measured 2 57 cm on the long axis and   1 06 cm in thickness   Tumor staging by EUS is T3 N0  The T staging was   suboptimal owing to the tangential position of the mass in the cardia  There was no evidence of lymph node/adenopathy in the celiac region,   gatrohepatic ligament or distal paraesophageal space  COMPLICATIONS: There were no complications  IMPRESSIONS: The EG junction was located at 33 cm  There was a   circumferential mass at the level of the EG junction  The endoscope has   passed with resistanece  The distal esophagus was normal  No evidence of   Pelaez's esophagus in the esophagus  A tumor found in the cardia  Multiple biopsies taken  A hiatus hernia found  Normal duodenum  There was   a wall thickenning at the distal 1-2 cm of the esophagus and the EG   junction  At the EG junction the esophageal wall measured about 1 12 cm in   thickness  Mass in the cardia; tumor staging by EUS: T3 N0  The T staging   was suboptimal owing to the tangential position of the mass in the cardia  RECOMMENDATIONS: Follow with Dr Moises Madsen  Call Dr Ivet Shaikh   [phone] with questions or problems  ESTIMATED BLOOD LOSS:     PATHOLOGY SPECIMENS: Multiple cold forceps biopsies taken for pathology   (jar 1)  Associated finding: Tumor  PROCEDURE CODES:     ICD-9 Codes: 239 0 Neoplasm of unspecified nature of digestive system   553 3 Diaphragmatic hernia without mention of obstruction or gangrene   537 9 Unspecified disorders of stomach and duodenum     ICD-10 Codes: P09 5 Neoplasm of uncertain behavior of stomach K44   Diaphragmatic hernia D49 0 Neoplasm of unspecified behavior of digestive   system     PERFORMED BY: MARAH Denney  on 07/27/2018  Version 1, electronically signed by MARAH Denney  on   07/27/2018 at 14:13

## 2018-07-27 NOTE — H&P
History and Physical - [facility]  David Archibald 80 y o  female MRN: [id number]                  HPI: David Archibald is a 80y o  year old female who presents for the evaluation of distal esophageal tumor, likely adenocarcinoma  Patient is scheduled for an endoscopic ultrasound  REVIEW OF SYSTEMS: Per the HPI, and otherwise unremarkable  Historical Information   Past Medical History:   Diagnosis Date    Arthritis     Asthma     [facility] disease) ([facility] )     Ectopic pregnancy     Esophageal cancer (HCC)     GERD (gastroesophageal reflux disease)     Hiatal hernia     Hyperlipidemia     Hypertension     Iron deficiency anemia     Ulcerative colitis ([facility] )      Past Surgical History:   Procedure Laterality Date    ANKLE GANGLION CYST EXCISION      ECTOPIC PREGNANCY SURGERY  1969    HIP SURGERY      JOINT REPLACEMENT Bilateral     hip    JOINT REPLACEMENT Right     knee    [place] ESOPHAGOGASTRODUODENOSCOPY TRANSORAL DIAGNOSTIC N/A 6/20/2018    Procedure: ESOPHAGOGASTRODUODENOSCOPY (EGD); Surgeon: Rolf Egan MD;  [facility];   Service: Gastroenterology    REPLACEMENT TOTAL KNEE Right     TOTAL HIP ARTHROPLASTY Left 2005    TOTAL HIP ARTHROPLASTY Right 2007     Social History   History   Alcohol Use No     History   Drug Use No     History   Smoking Status    Current Every Day Smoker    Packs/day: 1 00    Types: Cigarettes   Smokeless Tobacco    Never Used     Family History   Problem Relation Age of Onset    Heart attack Mother     Hypertension Mother     Heart disease Father     Kidney cancer Sister     Prostate cancer Brother        Meds/Allergies     Prescriptions Prior to Admission   Medication    atorvastatin (LIPITOR) 10 mg tablet    Calcium Citrate-Vitamin D (CALCIUM + D PO)    citalopram (CeleXA) 20 mg tablet    esomeprazole (NexIUM) 40 mg packet    glucosamine-chondroitin 500-400 MG tablet    irbesartan (AVAPRO) 75 mg tablet    Iron-Vit C-Vit B12-Folic Acid (IRON 789 PLUS PO)    multivitamin (THERAGRAN) TABS    Psyllium (METAMUCIL FIBER PO)    Tiotropium Bromide Monohydrate (SPIRIVA HANDIHALER IN)       No Known Allergies    Objective     Blood pressure 168/73, pulse 58, temperature 97 6 °F (36 4 °C), temperature source Tympanic, resp  rate 21, height 5' 1" (1 549 m), weight 72 6 kg (160 lb), SpO2 96 %  PHYSICAL EXAM    Gen: NAD  CV: RRR  CHEST: Clear  ABD: soft, NT/ND  EXT: no edema      ASSESSMENT/PLAN:  This is a 80y o  year old female here for endoscopic ultrasound the for the staging code distal esophageal carcinoma, and she is stable and optimized for her procedure

## 2018-07-30 ENCOUNTER — RADIATION ONCOLOGY CONSULT (OUTPATIENT)
Dept: RADIATION ONCOLOGY | Facility: HOSPITAL | Age: 83
End: 2018-07-30
Attending: RADIOLOGY
Payer: COMMERCIAL

## 2018-07-30 ENCOUNTER — CLINICAL SUPPORT (OUTPATIENT)
Dept: RADIATION ONCOLOGY | Facility: HOSPITAL | Age: 83
End: 2018-07-30
Payer: COMMERCIAL

## 2018-07-30 VITALS
OXYGEN SATURATION: 97 % | DIASTOLIC BLOOD PRESSURE: 58 MMHG | HEART RATE: 78 BPM | BODY MASS INDEX: 30.04 KG/M2 | WEIGHT: 159 LBS | SYSTOLIC BLOOD PRESSURE: 110 MMHG | TEMPERATURE: 98.7 F

## 2018-07-30 DIAGNOSIS — C15.5 CANCER OF ABDOMINAL ESOPHAGUS (HCC): Primary | ICD-10-CM

## 2018-07-30 PROCEDURE — 99215 OFFICE O/P EST HI 40 MIN: CPT | Performed by: RADIOLOGY

## 2018-07-30 PROCEDURE — G0463 HOSPITAL OUTPT CLINIC VISIT: HCPCS | Performed by: RADIOLOGY

## 2018-07-30 NOTE — PROGRESS NOTES
David Archibald  1935  Ms Elia Jimenes is a 80 y o  female    Chief Complaint   Patient presents with    Consult     Radiation Oncology     Assessment:  Adenocarcinoma at the level of the EG junction with nodular tumor which measured 3-4 cm in size that occupied 75-99% of the circumference of the cardia of the stomach causing friable narrowing  There was a 7 cm hiatal hernia without Barretts finding in the esophagus and the stomach and distal esophagus appeared normal    EUS examination revealed wall thickening of the distal 1-2 cm of the distal esophagus and EG junction and the mass from the cardia measured 2 6 cm in the long axis and 1 cm in thickness  The T staging although sub optimal due to position of the mass was considered T3 and N 0 in view there was no evidence of lymphadenopathy in the celiac region, distal paraesophageal and gastrohepatic ligament  Final pathologic diagnosis is adenocarcinoma  PET-CT scan July 18 described larger hypermetabolic lesion involving distal esophagus and proximal stomach which measured approximately 5 6 cm in widest dimension compatible with malignancy  Plan:  Definitive chemo radiation therapy if the patient is deemed not a surgical candidate  Discussion and summary:  In the last endoscopy performed by [facility] July 27 dominant tumor was in the cardia of the stomach with involvement of the EG junction  It could also be an EG junction malignancy with involvement of the cardia of the stomach  It is doubtful that she can be a surgical candidate in view of her age but she will be evaluated by Dr Graham Allen  With or without surgery she will be treated with chemotherapy Taxol / carboplatin in concurrent with radiation therapy (IMRT or 3D conformal radiation therapy)  We discussed with patient treatment course of 28 treatments total dose 50 4 Gy with the initial 45 Gy to include lymph nodes and 5 4 Gy additional dose boost to the primary tumor site only    We inform her side effects of fatigue, low blood count, towards the end of treatment pain and discomfort swallowing  Side effects usually will start just after the 3rd week of treatment  We schedule her for CT simulation and planning at [address] [place] [address] after August 9  Physical examination:  Patient is alert oriented, very pleasant, appears her stated age, cooperative without complaints  There are no palpable nodes  Lungs are clear  Heart tones are normal with regular rate and rhythm  Abdomen is soft, no areas of tenderness and no masses  No edema lower extremities  Brief neurologic examination is without focal findings  Cancer Staging  Stage IB adenocarcinoma EG junction and cardia stomach  Oncology History    Pt presented to office of Dr Kassy Roche with c/o dysphagia, with solid foods and regurgitation  Also difficult or painful swallowing noted in the upper esophagus  This was occurring x 6 months  The sx were worsening  She was able to eat soft foods  He has been taking a PPI, for GERD  Wt loss is >10 lbs  Diagnostics ordered  6/13/18 Barium Swallow showed Large hiatal hernia with intrathoracic stomach  6/20/18 EGD: performed by Dr Alba Lopez:   Esophagus: There was a medium-sized friable tumor, that   measured 4 cm in size, in the distal third of the esophagus and GE   junction  Multiple forceps biopsies was taken (jar 1)  Dilatation was   performed, using a balloon successfully  A 12-13 5-14 mm dilator was   passed  Stomach: There was an 8 cm mixed hiatus hernia visible in the   body of the stomach  PATH was + for 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  -Submitted P53 stain is focally positive    Staging CT scan  of chest and abdomen done : 1 Large hiatal hernia containing much of the stomach    2   Subtle narrowing of the distal esophagus at the gastroesophageal junction, presumably representing the esophageal tumor identified endoscopically  3   No evidence of metastases in the chest or abdomen  7/5/18 Pt met with Dr Niesha Guerrero:  at that point pt reported she was swallowing better and her weight had stabilized  7/18/18 Pet/ct:   Prominent focal FDG uptake at the anterior wall of the distal esophagus/proximal stomach compatible with hypermetabolic malignancy  Based on the PET images this measures   approximately 5 6 x 3 3 x 4 7 cm    2   No findings for hypermetabolic metastasis    8/87/79 f/u with Dr Niesha Guerrero  He will Have repeat biopsy/EUS  Surgical consult  May not be surgical candidate because of lung disease  ( continues to smoke)   He discussed Taxol/Carboplatin with Radiation  Referred to Surgical Onc, Radiation Oncology  Returns to med onc 8/2/18  Dr Shanell Guerrero, consult 8/9/18        Cancer of abdominal esophagus ([facility] )    6/20/2018 Biopsy     EGD :medium-sized friable tumor, that   measured 4 cm in size, in the distal third of the esophagus and GE   junction  PATH:-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    Additional evaluation for depression, suicide risk assesment, referral to PCP or psychiatry, medication started:  no    Advanced Care Planning for Patients >65 years  Advanced Care Planning Discussed:  yes  Patient named surrogate decision maker or care plan in chart: yes        Health Maintenance   Topic Date Due    Depression Screening PHQ-9  1935    Fall Risk  10/12/2000    Urinary Incontinence Screening  10/12/2000    PNEUMOCOCCAL POLYSACCHARIDE VACCINE AGE 72 AND OVER  10/12/2000    GLAUCOMA SCREENING 72 + YR  10/12/2002    INFLUENZA VACCINE  09/01/2018    DTaP,Tdap,and Td Vaccines (2 - Td) 05/19/2027       Patient Active Problem List   Diagnosis    Cancer of abdominal esophagus (HCC)     Past Medical History:   Diagnosis Date    Arthritis     Asthma     [facility]ch[facility] disease) ([facility] )     Ectopic pregnancy     Esophageal cancer ([facility] )     GERD (gastroesophageal reflux disease)     Hiatal hernia     Hyperlipidemia     Hypertension     Iron deficiency anemia     Ulcerative colitis ([facility] )      Past Surgical History:   Procedure Laterality Date    ANKLE GANGLION CYST EXCISION      ECTOPIC PREGNANCY SURGERY  1969    HIP SURGERY      JOINT REPLACEMENT Bilateral     hip    JOINT REPLACEMENT Right     knee    FL EDG US EXAM SURGICAL ALTER STOM DUODENUM/JEJUNUM Left 7/27/2018    Procedure: RADIAL ENDOSCOPIC U/S and EGD;  Surgeon: Ran Mcclure MD;  [facility]; Service: Gastroenterology    [place] ESOPHAGOGASTRODUODENOSCOPY TRANSORAL DIAGNOSTIC N/A 6/20/2018    Procedure: ESOPHAGOGASTRODUODENOSCOPY (EGD); Surgeon: Chencho Granger MD;  [facility]; Service: Gastroenterology    REPLACEMENT TOTAL KNEE Right     TOTAL HIP ARTHROPLASTY Left 2005    TOTAL HIP ARTHROPLASTY Right 2007     Family History   Problem Relation Age of Onset    Heart attack Mother     Hypertension Mother     Heart disease Father     Kidney cancer Sister     Prostate cancer Brother      Social History     Social History    Marital status:      Spouse name: N/A    Number of children: N/A    Years of education: N/A     Occupational History    Not on file       Social History Main Topics    Smoking status: Current Every Day Smoker     Packs/day: 0 50     Types: Cigarettes    Smokeless tobacco: Never Used      Comment: trying to quit, currently smokes about 12 cigarettes a day     Alcohol use No    Drug use: No    Sexual activity: Not on file     Other Topics Concern    Not on file     Social History Narrative    No narrative on file       Current Outpatient Prescriptions:     atorvastatin (LIPITOR) 10 mg tablet, Take 10 mg by mouth daily, Disp: , Rfl:     Calcium Citrate-Vitamin D (CALCIUM + D PO), Take by mouth, Disp: , Rfl:     esomeprazole (NexIUM) 40 mg packet, Take 40 mg by mouth every morning before breakfast, Disp: , Rfl:     glucosamine-chondroitin 500-400 MG tablet, Take 1 tablet by mouth 3 (three) times a day, Disp: , Rfl:     irbesartan (AVAPRO) 75 mg tablet, Take 75 mg by mouth daily, Disp: , Rfl:     multivitamin (THERAGRAN) TABS, Take 1 tablet by mouth daily, Disp: , Rfl:     Psyllium (METAMUCIL FIBER PO), Take by mouth, Disp: , Rfl:     Tiotropium Bromide Monohydrate (SPIRIVA HANDIHALER IN), Inhale, Disp: , Rfl:     citalopram (CeleXA) 20 mg tablet, Take 20 mg by mouth daily, Disp: , Rfl:     No Known Allergies    Review of Systems:  Review of Systems   Constitutional: Positive for appetite change (little decreased due to trouble swallowing), fatigue and unexpected weight change (25 lb weight loss in 1 5 months)  HENT: Positive for trouble swallowing  Does well with liquids and soft foods, pain and difficulty swallowing harder foods   Eyes: Negative  Respiratory: Positive for cough (clear/white phlegm) and shortness of breath  Hx of COPD  Currently trying to quit smoking, smokes 12 cigarettes daily   Cardiovascular: Negative  Gastrointestinal: Positive for constipation (BM usually every 3 days) and nausea (mild)  Endocrine: Negative  Genitourinary:        Stress incontinence at times, wears a pad every day   Musculoskeletal: Positive for arthralgias (generalized)  Right knee replacement   Skin: Negative  Allergic/Immunologic: Negative  Neurological: Negative  Hematological: Negative  Psychiatric/Behavioral: Negative  Vitals:    07/30/18 1300   BP: 110/58   BP Location: Left arm   Pulse: 78   Temp: 98 7 °F (37 1 °C)   TempSrc: Temporal   SpO2: 97%   Weight: 72 1 kg (159 lb)            Imaging:Nm Pet Ct Skull Base To Mid Thigh    Result Date: 7/18/2018  Narrative: PET/CT SCAN INDICATION: Newly diagnosed esophageal cancer  Initial staging exam  C15 5: Malignant neoplasm of lower third of esophagus MODIFIER: PI COMPARISON: CT chest and abdomen from 7/2/2018 CELL TYPE:  intramucosal carcinoma in Pelaez mucosa with high grade dysplasia (bx esophagogastric junction 6/20/18) TECHNIQUE:   8 3 mCi F-18-FDG administered IV  Multiplanar attenuation corrected and non attenuation corrected PET images are available for interpretation, and contiguous, low dose, axial CT sections were obtained from the skull base through the femurs   Oral contrast material (7 5 cc Omnipaque-240 in 300 cc water) was administered  Intravenous contrast material was not utilized  This examination, like all CT scans performed in the [facility], was performed utilizing techniques to minimize radiation dose exposure, including the use of iterative reconstruction and automated exposure control  Fasting serum glucose: 92 mg/dl FINDINGS: VISUALIZED BRAIN:   No acute abnormalities are seen  HEAD/NECK:   There is a physiologic distribution of FDG  No FDG avid cervical adenopathy is seen  CT images: Scattered sinus mucosal thickening noted  CHEST:   Crescentic focal FDG uptake noted along the anterior wall of the distal esophagus/proximal stomach, SUV max of 13 2  There is suggestion of associated wall thickening here on CT, see images 99 to 107 series 3  Based on the PET images this measures approximately 5 6 x 3 3 x 4 7 cm  No FDG avid lymph nodes  CT images: Large hiatal hernia    Moderate coronary artery calcifications noted  ABDOMEN:   No FDG avid soft tissue lesions are seen  CT images: 2 mm calculus noted in the left kidney lower pole  There is colonic diverticulosis  PELVIS: No FDG avid soft tissue lesions are seen  CT images: Unremarkable  OSSEOUS STRUCTURES: No FDG avid lesions are seen  CT images: Prior bilateral hip replacement  Impression: 1  Prominent focal FDG uptake at the anterior wall of the distal esophagus/proximal stomach compatible with hypermetabolic malignancy  2   No findings for hypermetabolic metastasis  Workstation performed: [id number]     Ct Chest And Abdomen W Contrast    Result Date: 7/3/2018  Narrative: CT CHEST AND ABDOMEN WITH IV CONTRAST INDICATION:   C15 9: Malignant neoplasm of esophagus, unspecified  80-year-old woman with known hiatal hernia, 4 cm tumor in the distal 3rd of the esophagus, near the GE junction, recently diagnosed by EGD  COMPARISON: CT from October 18, 2017  TECHNIQUE:  CT examination of the chest and abdomen was performed  Axial, sagittal, and coronal 2D reformatted images were created from the source data and submitted for interpretation  Radiation dose length product (DLP) for this visit:  351 46 mGy-cm   This examination, like all CT scans performed in the [facility], was performed utilizing techniques to minimize radiation dose exposure, including the use of iterative  reconstruction and automated exposure control  IV Contrast:  100 mL of iohexol (OMNIPAQUE) Enteric Contrast:  Enteric contrast was administered  FINDINGS: CHEST LUNGS: Lungs are clear  There is no tracheal or endobronchial lesion  PLEURA:  No pleural effusion  HEART AND VESSELS:  Heart normal in size  Extensive coronary artery calcification  Thoracic aorta and central pulmonary arteries normal in caliber  MEDIASTINUM AND ADELIA: No lymphadenopathy or mass  Large hiatal hernia containing much of the stomach    Narrowing of the distal esophageal lumen at the esophagogastric junction, likely reflecting the tumor noted endoscopically  No esophageal dilatation  Trachea and main stem bronchi normal  BODY WALL AND LOWER NECK:   Unremarkable  ABDOMEN LIVER/BILIARY TREE:  Unremarkable  GALLBLADDER:  No calcified gallstones  No pericholecystic inflammatory change  SPLEEN:  Unremarkable  PANCREAS:  Pancreatic tail with in the aforementioned large hiatal hernia  Pancreas otherwise unremarkable  ADRENAL GLANDS:  Unremarkable  KIDNEYS/URETERS:  Unremarkable  No hydronephrosis  VISUALIZED BOWEL:  Gastric fundus and body with in a large hiatal hernia  Included portions of the small intestine and colon unremarkable  VISUALIZED ABDOMINAL CAVITY: No lymphadenopathy or mass  No ascites  No extraluminal gas  OSSEOUS STRUCTURES:  No acute fracture or destructive osseous lesion  Impression: 1  Large hiatal hernia containing much of the stomach  2   Subtle narrowing of the distal esophagus at the gastroesophageal junction, presumably representing the esophageal tumor identified endoscopically  3   No evidence of metastases in the chest or abdomen   Workstation performed: [id number]       Teaching RT education packet provided to patient, all questions answered

## 2018-08-02 ENCOUNTER — OFFICE VISIT (OUTPATIENT)
Dept: HEMATOLOGY ONCOLOGY | Facility: CLINIC | Age: 83
End: 2018-08-02
Payer: COMMERCIAL

## 2018-08-02 VITALS
HEIGHT: 61 IN | DIASTOLIC BLOOD PRESSURE: 82 MMHG | RESPIRATION RATE: 18 BRPM | WEIGHT: 159.2 LBS | BODY MASS INDEX: 30.06 KG/M2 | HEART RATE: 70 BPM | TEMPERATURE: 97.8 F | SYSTOLIC BLOOD PRESSURE: 140 MMHG

## 2018-08-02 DIAGNOSIS — C15.5 MALIGNANT NEOPLASM OF ABDOMINAL ESOPHAGUS (HCC): Primary | ICD-10-CM

## 2018-08-02 DIAGNOSIS — C15.5 CANCER OF ABDOMINAL ESOPHAGUS (HCC): Primary | ICD-10-CM

## 2018-08-02 PROCEDURE — 99214 OFFICE O/P EST MOD 30 MIN: CPT | Performed by: PHYSICIAN ASSISTANT

## 2018-08-02 NOTE — PROGRESS NOTES
Hematology/Oncology Outpatient Follow-up  Serge Gutiérrez 80 y o  female 1935 [id number]    Date:  8/2/2018      Assessment and Plan:  1  Cancer of abdominal esophagus [facility])  72-year-old female with newly diagnosed adenocarcinoma, GI primary  EGD performed on 06/27/2018, operative report notes a dominant tumor in the cardia with involvement of the EG junction  Also, there is possibility that this could be an EG junction malignancy with involvement of the cardia  Due to the above, patient is  recommended to undergo concurrent chemotherapy and radiation therapy  Due to her age and poor pulmonary function (continues to smoke 10 cigarettes per day), patient is unlikely a surgical candidate  She will be meeting with Dr Mirna Lock on 8/9/18  Dr Wilbur Da Silva established plan  Patient will receive carboplatin AUC 2, Taxol 50 milligrams/meter squared weekly concurrent with radiation  Reviewed side effects include but are not limited to cytopenias, alopecia, diarrhea, vomiting, constipation, nausea, peripheral neuropathy, electrolyte abnormalities, mucositis, muscle/joint pain, headache, liver toxicity, allergic reactions, fatigue  Dr Wilbur Da Silva reviewed the above with patient  Our chemotherapy nurse spent time reviewing this in detail provided patient with wrist written information in regards to the above  Patient has signed informed consent and was agreeable to proceed  She was also provided information regarding management of constipation and diarrhea at home with over-the-counter medications  She is aware to contact our office at any time with questions/symptoms  HPI:       Cancer of abdominal esophagus ([facility] [address] )    6/20/2018 Biopsy     EGD :medium-sized friable tumor, that   measured 4 cm in size, in the distal third of the esophagus and GE   junction        PATH:-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  -Submitted P53 stain is focally positive 7/5/2018 Initial Diagnosis     Cancer of abdominal esophagus ([facility] [address] )         80year old female with newly diagnosed adenocarcinoma  She began having weight loss over the past 3 months, 20-25 lbs  She had some decrease in appetite  She had EGD on 6/20/18 with Dr Deepak Pedroza  This showed tumor at the distal third of the esophagus and the GE junction  Pathology showed intramucosal carcinoma in the Pelaez mucosa of the distinctive type with high-grade dysplasia, P 53 positive  This specimen was also sent to [facility] pathology department for consultation  They concurred with the above pathology  A CT of the chest abdomen pelvis was performed on 07/02/2018  This showed a large hiatal hernia, subtle narrowing of the distal esophagus at the GE junction  No evidence of metastatic disease in the chest or abdomen  Patient then had a PET/CT on 07/18/2018  This showed prominent focal uptake at the anterior wall of the distal esophagus/proximal stomach  No other hypermetabolism was noted  There was thought that original biopsy may have only been superficial   Therefore, patient was referred to have an additional EGD with EUS  This was performed by Dr Norma Rodrigez on 07/27/2018  There was a circumferential mass at the level of the EG junction  Distal esophagus was noted to be normal  No evidence of Pelaez's esophagus in the esophagus  A tumor was found in the cardia  Biopsies were taken from this  I handle hernia was also noted  The duodenum was normal  There was wall thickening at the distal 1-2 cm of the esophagus and the GE junction  The GE junction measured about 1 2 cm in thickness  Tumor staging by U/S was T3 N0  Of note, T staging was suboptimal due to the tangential position of the mass in the cardia  Pathology of the mass in the cardia showed adenocarcinoma  Due to underlying lung disease and continuing to smoke cigarettes, patient likely is not a good surgical candidate    Therefore, patient is presently being recommended for radiation and chemotherapy  She will also see surgeon in consultation  Patient was seen by Radiation Oncology on 07/30/2018  With or without surgery, she is being recommended to receive concurrent chemo on RT, (IMRT or 3D conformal RT)  Plan for 28 treatments total dose, 50 4 Gy  Tumor markers:   7/18/18 CEA 23 5     Interval history:  No longer losing weight, can only eat small meals  She continues to smoke 10 cigarettes per day  Had episode of dizziness/lightheadedness in waiting room with flash of light in right eye; has events like this happen since she was age 36, happens few times per year  This event was the same as previous events in past 60 years    ROS: Review of Systems   Constitutional: Positive for appetite change and fatigue  Negative for chills and fever  HENT: Negative for mouth sores and nosebleeds  Cardiovascular: Negative for chest pain and palpitations  Gastrointestinal: Positive for abdominal pain (sometimes after eating) and constipation (no bowel movement in the past 4 days)  Negative for blood in stool, diarrhea, nausea and vomiting  Genitourinary: Negative for difficulty urinating and dysuria  Musculoskeletal: Negative for arthralgias and back pain  Skin: Negative  Neurological: Positive for dizziness (see HPI) and light-headedness (see HPI)  Negative for speech difficulty, weakness, numbness and headaches  Hematological: Negative  Psychiatric/Behavioral: Negative          Past Medical History:   Diagnosis Date    Arthritis     Asthma     COPD (chronic obstructive pulmonary disease) ([facility] [address] )     Ectopic pregnancy     Esophageal cancer (HCC)     GERD (gastroesophageal reflux disease)     Hiatal hernia     Hyperlipidemia     Hypertension     Iron deficiency anemia     Ulcerative colitis ([facility] [address] )        Past Surgical History:   Procedure Laterality Date    ANKLE GANGLION CYST EXCISION      ECTOPIC PREGNANCY SURGERY 1969    HIP SURGERY      JOINT REPLACEMENT Bilateral     hip    JOINT REPLACEMENT Right     knee    [place] EDG US EXAM SURGICAL ALTER STOM DUODENUM/JEJUNUM Left 7/27/2018    Procedure: RADIAL ENDOSCOPIC U/S and EGD;  Surgeon: Teto Nelson MD;  Location: [facility]; Service: Gastroenterology    [place] ESOPHAGOGASTRODUODENOSCOPY TRANSORAL DIAGNOSTIC N/A 6/20/2018    Procedure: ESOPHAGOGASTRODUODENOSCOPY (EGD); Surgeon: Chaya Bullock MD;  Location: [facility]; Service: Gastroenterology    REPLACEMENT TOTAL KNEE Right     TOTAL HIP ARTHROPLASTY Left 2005    TOTAL HIP ARTHROPLASTY Right 2007       Social History     Social History    Marital status:       Spouse name: N/A    Number of children: N/A    Years of education: N/A     Social History Main Topics    Smoking status: Current Every Day Smoker     Packs/day: 0 50     Types: Cigarettes    Smokeless tobacco: Never Used      Comment: trying to quit, currently smokes about 12 cigarettes a day     Alcohol use No    Drug use: No    Sexual activity: Not on file     Other Topics Concern    Not on file     Social History Narrative    No narrative on file       Family History   Problem Relation Age of Onset    Heart attack Mother     Hypertension Mother     Heart disease Father     Kidney cancer Sister     Prostate cancer Brother        No Known Allergies      Current Outpatient Prescriptions:     atorvastatin (LIPITOR) 10 mg tablet, Take 10 mg by mouth daily, Disp: , Rfl:     Calcium Citrate-Vitamin D (CALCIUM + D PO), Take by mouth, Disp: , Rfl:     citalopram (CeleXA) 20 mg tablet, Take 20 mg by mouth daily, Disp: , Rfl:     esomeprazole (NexIUM) 40 mg packet, Take 40 mg by mouth every morning before breakfast, Disp: , Rfl:     glucosamine-chondroitin 500-400 MG tablet, Take 1 tablet by mouth 3 (three) times a day, Disp: , Rfl:     irbesartan (AVAPRO) 75 mg tablet, Take 75 mg by mouth daily, Disp: , Rfl:     multivitamin (THERAGRAN) TABS, Take 1 tablet by mouth daily, Disp: , Rfl:     Psyllium (METAMUCIL FIBER PO), Take by mouth, Disp: , Rfl:     Tiotropium Bromide Monohydrate (SPIRIVA HANDIHALER IN), Inhale, Disp: , Rfl:       Physical Exam:  Resp 18   Ht 5' 1" (1 549 m)   Wt 72 2 kg (159 lb 3 2 oz)   BMI 30 08 kg/m²     Physical Exam   Constitutional: She is oriented to person, place, and time  She appears well-developed and well-nourished  No distress  HENT:   Head: Normocephalic and atraumatic  Eyes: Conjunctivae are normal  No scleral icterus  Neck: Normal range of motion  Neck supple  Cardiovascular: Normal rate, regular rhythm and normal heart sounds  No murmur heard  Pulmonary/Chest: Effort normal and breath sounds normal  No respiratory distress  Abdominal: Soft  There is no tenderness  Musculoskeletal: Normal range of motion  She exhibits no edema or tenderness  Lymphadenopathy:     She has no cervical adenopathy  Neurological: She is alert and oriented to person, place, and time  No cranial nerve deficit  Skin: Skin is warm and dry  Psychiatric: She has a normal mood and affect  Vitals reviewed  Repeat BP by myself: 140/78     Labs:  Lab Results   Component Value Date    WBC 6 98 07/18/2018    HGB 13 8 07/18/2018    HCT 43 6 07/18/2018    MCV 92 07/18/2018     07/18/2018     Lab Results   Component Value Date     (L) 07/18/2018    K 3 9 07/18/2018    CL 99 (L) 07/18/2018    CO2 30 07/18/2018    ANIONGAP 5 07/18/2018    BUN 11 07/18/2018    CREATININE 0 70 07/18/2018    GLUCOSE 106 06/18/2017    GLUF 93 07/18/2018    CALCIUM 9 5 07/18/2018    AST 16 07/18/2018    ALT 15 07/18/2018    ALKPHOS 103 07/18/2018    PROT 7 6 07/18/2018    BILITOT 0 44 07/18/2018    EGFR 81 07/18/2018       Patient voiced understanding and agreement in the above discussion  Aware to contact our office with questions/symptoms in the interim  decreased balance during turns/decreased sequencing ability/decreased step length/decreased weight-shifting ability

## 2018-08-03 ENCOUNTER — DOCUMENTATION (OUTPATIENT)
Dept: SURGICAL ONCOLOGY | Facility: CLINIC | Age: 83
End: 2018-08-03

## 2018-08-03 RX ORDER — SODIUM CHLORIDE 9 MG/ML
20 INJECTION, SOLUTION INTRAVENOUS ONCE
Status: COMPLETED | OUTPATIENT
Start: 2018-08-06 | End: 2018-08-06

## 2018-08-03 NOTE — PROGRESS NOTES
GI Oncology Nurse Navigator Note:    I received a call my Carmelita stating that she received the [employer] forms in the mail  She much appreciated the effort, but does not feel she needs to use that service at this time, as she has family and/or friends that she feels would be willing to take her  I expressed that when I filled out the forms I did mentioned that she may not need to use the service, unless she has no ride  She expressed that she is aware it is her sister Sherrie Harris that is trying to arrange everything  She expressed that she is "Hard to please and does not have patience"  I expressed to Carmelita that she can call [employer] and tell them she has a ride and she does not need their service right now  But, she will call them if/when she does need a ride  Carmelita expressed that the Med Onc office already got her setup yesterday to use the [address] starting Monday  Carmelita is aware she can use the service, or call and cx until she needs to use the service  I also introduced myself  as her Oncology Nurse Navigator and I explained the role and how I am able to help her during her treatment  She was appreciative of the info, thanked me and hung up the phone quickly  I called Sherrie Harris (sister) just to let her know that Carmelita did receive the forms via mail  I also let her know that if she has another ride, she does not have to use [employer], unless she has no ride and wants to use the service  I also explained that she or Carmelita may call me with any questions or concerns as well as calling her treatment team PRN

## 2018-08-06 ENCOUNTER — HOSPITAL ENCOUNTER (OUTPATIENT)
Dept: INFUSION CENTER | Facility: HOSPITAL | Age: 83
Discharge: HOME | End: 2018-08-06
Payer: COMMERCIAL

## 2018-08-06 VITALS
HEIGHT: 61 IN | WEIGHT: 155.65 LBS | SYSTOLIC BLOOD PRESSURE: 128 MMHG | TEMPERATURE: 97.6 F | BODY MASS INDEX: 29.39 KG/M2 | HEART RATE: 78 BPM | RESPIRATION RATE: 18 BRPM | DIASTOLIC BLOOD PRESSURE: 74 MMHG

## 2018-08-06 PROCEDURE — 96417 CHEMO IV INFUS EACH ADDL SEQ: CPT

## 2018-08-06 PROCEDURE — 96413 CHEMO IV INFUSION 1 HR: CPT

## 2018-08-06 PROCEDURE — 96367 TX/PROPH/DG ADDL SEQ IV INF: CPT

## 2018-08-06 RX ADMIN — DEXAMETHASONE SODIUM PHOSPHATE: 10 INJECTION, SOLUTION INTRAMUSCULAR; INTRAVENOUS at 09:48

## 2018-08-06 RX ADMIN — DIPHENHYDRAMINE HYDROCHLORIDE: 50 INJECTION, SOLUTION INTRAMUSCULAR; INTRAVENOUS at 09:12

## 2018-08-06 RX ADMIN — PACLITAXEL 86 MG: 6 INJECTION, SOLUTION, CONCENTRATE INTRAVENOUS at 10:14

## 2018-08-06 RX ADMIN — CARBOPLATIN 189 MG: 10 INJECTION, SOLUTION INTRAVENOUS at 11:19

## 2018-08-06 RX ADMIN — SODIUM CHLORIDE 20 ML/HR: 0.9 INJECTION, SOLUTION INTRAVENOUS at 09:50

## 2018-08-06 NOTE — PLAN OF CARE
Problem: Potential for Falls  Goal: Patient will remain free of falls  INTERVENTIONS:  - Assess patient frequently for physical needs  -  Identify cognitive and physical deficits and behaviors that affect risk of falls    -  [place] fall precautions as indicated by assessment   - Educate patient/family on patient safety including physical limitations  - Instruct patient to call for assistance with activity based on assessment  - Modify environment to reduce risk of injury  - Consider OT/PT consult to assist with strengthening/mobility   Outcome: Progressing

## 2018-08-06 NOTE — PROGRESS NOTES
Spoke with Dev Hyman, per Lory Castro since it's the patients first time chemo treatment ok to proceed with treatment without labs

## 2018-08-08 PROBLEM — E78.5 HYPERLIPIDEMIA: Status: ACTIVE | Noted: 2018-08-08

## 2018-08-08 PROBLEM — M19.90 ARTHRITIS: Status: ACTIVE | Noted: 2018-08-08

## 2018-08-08 PROBLEM — K51.90 ULCERATIVE COLITIS (HCC): Status: ACTIVE | Noted: 2018-08-08

## 2018-08-08 PROBLEM — K21.9 GERD (GASTROESOPHAGEAL REFLUX DISEASE): Status: ACTIVE | Noted: 2018-08-08

## 2018-08-08 PROBLEM — D50.9 IRON DEFICIENCY ANEMIA: Status: ACTIVE | Noted: 2018-08-08

## 2018-08-08 PROBLEM — J45.909 ASTHMA: Status: ACTIVE | Noted: 2018-08-08

## 2018-08-08 PROBLEM — J44.9 COPD (CHRONIC OBSTRUCTIVE PULMONARY DISEASE) (HCC): Status: ACTIVE | Noted: 2018-08-08

## 2018-08-09 ENCOUNTER — OFFICE VISIT (OUTPATIENT)
Dept: HEMATOLOGY ONCOLOGY | Facility: CLINIC | Age: 83
End: 2018-08-09
Payer: COMMERCIAL

## 2018-08-09 ENCOUNTER — OFFICE VISIT (OUTPATIENT)
Dept: SURGICAL ONCOLOGY | Facility: CLINIC | Age: 83
End: 2018-08-09
Payer: COMMERCIAL

## 2018-08-09 VITALS
HEART RATE: 100 BPM | RESPIRATION RATE: 22 BRPM | WEIGHT: 155 LBS | HEIGHT: 61 IN | TEMPERATURE: 98.3 F | SYSTOLIC BLOOD PRESSURE: 138 MMHG | BODY MASS INDEX: 29.27 KG/M2 | DIASTOLIC BLOOD PRESSURE: 72 MMHG

## 2018-08-09 VITALS
TEMPERATURE: 98.2 F | OXYGEN SATURATION: 98 % | SYSTOLIC BLOOD PRESSURE: 118 MMHG | BODY MASS INDEX: 29.53 KG/M2 | RESPIRATION RATE: 18 BRPM | DIASTOLIC BLOOD PRESSURE: 70 MMHG | HEIGHT: 61 IN | HEART RATE: 89 BPM | WEIGHT: 156.4 LBS

## 2018-08-09 DIAGNOSIS — T45.1X5A CHEMOTHERAPY-INDUCED NAUSEA: ICD-10-CM

## 2018-08-09 DIAGNOSIS — R11.0 CHEMOTHERAPY-INDUCED NAUSEA: ICD-10-CM

## 2018-08-09 DIAGNOSIS — C15.5 CANCER OF ABDOMINAL ESOPHAGUS (HCC): Primary | ICD-10-CM

## 2018-08-09 DIAGNOSIS — C15.5 MALIGNANT NEOPLASM OF ABDOMINAL ESOPHAGUS (HCC): Primary | ICD-10-CM

## 2018-08-09 PROCEDURE — 99214 OFFICE O/P EST MOD 30 MIN: CPT | Performed by: PHYSICIAN ASSISTANT

## 2018-08-09 PROCEDURE — 99205 OFFICE O/P NEW HI 60 MIN: CPT | Performed by: SURGERY

## 2018-08-09 RX ORDER — LIDOCAINE AND PRILOCAINE 25; 25 MG/G; MG/G
CREAM TOPICAL
Qty: 30 G | Refills: 1 | Status: SHIPPED | OUTPATIENT
Start: 2018-08-09 | End: 2018-08-10 | Stop reason: SDUPTHER

## 2018-08-09 RX ORDER — DEXAMETHASONE 4 MG/1
TABLET ORAL
Qty: 5 TABLET | Refills: 2 | Status: SHIPPED | OUTPATIENT
Start: 2018-08-09 | End: 2018-08-14 | Stop reason: SDUPTHER

## 2018-08-09 RX ORDER — ONDANSETRON 4 MG/1
4 TABLET, FILM COATED ORAL EVERY 8 HOURS PRN
Qty: 20 TABLET | Refills: 0 | Status: SHIPPED | OUTPATIENT
Start: 2018-08-09 | End: 2018-08-28 | Stop reason: HOSPADM

## 2018-08-09 NOTE — LETTER
August 9, 2018     Aster Vargas MD  [address] [address]    Patient: Jared Gibbs   YOB: 1935   Date of Visit: 8/9/2018       Dear Dr Vincent Kayser:    Thank you for referring Elizabet Phillips to me for evaluation  Below are my notes for this consultation  If you have questions, please do not hesitate to call me  I look forward to following your patient along with you  Sincerely,        Master Fonseca MD        CC: MD Makenzie Martel MD Ephriam Bruns, MD Ephraim Simas, MD  8/9/2018  8:47 AM  Sign at close encounter               Surgical Oncology Consult       [address] [facility] [place]  [address]  [address] [address] [phone]    Jared Gibbs  1935  [id number]  [address] [facility] [place]  [address]  [facility] [address]  [phone]    Diagnoses and all orders for this visit:    Cancer of abdominal esophagus [facility])  -     Ambulatory referral to Surgical Oncology  -     Complete pulmonary function test; Future        Chief Complaint   Patient presents with    Esophageal Cancer     Pt here for consult for esophageal cancer  She reports 30-35 lb weight loss over the last few months, as well as dysphagia, pain after eating, and occasional N/V  She has already had 1 chemo treatment  Return in about 2 months (around 10/9/2018)  Cancer of abdominal esophagus ([address] )    6/20/2018 Biopsy     EGD :medium-sized friable tumor, that   measured 4 cm in size, in the distal third of the esophagus and GE   junction        PATH:-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  -Submitted P53 stain is focally positive           7/5/2018 Initial Diagnosis     Cancer of abdominal esophagus (HCC)          History of Present Illness:   80year-old female who noticed a 20-25 lb weight loss over the last 3 months  She noticed a decrease in appetite with occasional nausea vomiting  She also noticed some dysphagia associated with this  She underwent EGD on 06/20/2018  This showed a tumor in the distal 3rd of the esophagus and the GE junction  Pathology revealed intramucosal carcinoma in Pelaez's mucosa  Follow-up chest CT which I reviewed on July 2, 2018 revealed a large hiatal hernia, narrowing of the distal esophagus but no evidence of metastatic disease  Follow-up PET-CT which I reviewed on July 18, 2018 revealed FDG uptake in the anterior proximal stomach  This appeared to be more of a tumor in the intrathoracic stomach from her hiatal hernia  She underwent an EUS since it was felt that the original biopsy may have been superficial   This was performed on July 27, 2018  There was a mass at the level of the GE junction, but the distal esophagus was normal   There was a large tumor found in the cardia  Biopsies revealed adenocarcinoma  This was T3N0 by EUS  The patient has had 1 cycle of chemotherapy and she comes in now to discuss further therapy  She is otherwise feeling well  She does have a history of COPD  She continues to smoke a half packs cigarettes a day  She gets minimally short of breath when climbing a flight of steps  Her weight is currently stable  Review of Systems  Complete ROS Surg Onc:   Constitutional: The patient denies new or recent history of general fatigue  Patient had recent weight loss and change in appetite  Eyes: No complaints of visual problems, no scleral icterus  ENT: no complaints of ear pain, no hoarseness, no difficulty swallowing,  no tinnitus and no new masses in head, oral cavity, or neck  Cardiovascular: No complaints of chest pain, no palpitations, no ankle edema  Respiratory: No complaints of  cough  She does have occasional shortness of breath    Gastrointestinal: No complaints of jaundice, no bloody stools, no pale stools  Genitourinary: No complaints of dysuria, no hematuria, no nocturia, no frequent urination, no urethral discharge  Musculoskeletal: No complaints of weakness, paralysis, joint stiffness or arthralgias  Integumentary: No complaints of rash, no new lesions  Neurological: No complaints of convulsions, no seizures, no dizziness  Hematologic/Lymphatic: No complaints of easy bruising  Endocrine:  No hot or cold intolerance  No polydipsia, polyphagia, or polyuria  Allergy/immunology:  No environmental allergies  No food allergies  Not immunocompromised  Skin:  No pallor or rash  No wound  Patient Active Problem List   Diagnosis    Cancer of abdominal esophagus (HCC)    COPD (chronic obstructive pulmonary disease) (HCC)    GERD (gastroesophageal reflux disease)    Ulcerative colitis ([facility] )    Asthma    Arthritis    Hyperlipidemia    Iron deficiency anemia     Past Medical History:   Diagnosis Date    Arthritis     Asthma     COPD [facility]chronic obstructive pulmonary disease) ([facility] )     Ectopic pregnancy     GERD (gastroesophageal reflux disease)     Hiatal hernia     Hyperlipidemia     Hypertension     Iron deficiency anemia     Ulcerative colitis ([facility] )      Past Surgical History:   Procedure Laterality Date    ANKLE GANGLION CYST EXCISION      ECTOPIC PREGNANCY SURGERY  1969    HIP SURGERY      JOINT REPLACEMENT Bilateral     hip    JOINT REPLACEMENT Right     knee    OR EDG US EXAM SURGICAL ALTER STOM DUODENUM/JEJUNUM Left 7/27/2018    Procedure: RADIAL ENDOSCOPIC U/S and EGD;  Surgeon: Alondra Suazo MD;  [facility]; Service: Gastroenterology    OR ESOPHAGOGASTRODUODENOSCOPY TRANSORAL DIAGNOSTIC N/A 6/20/2018    Procedure: ESOPHAGOGASTRODUODENOSCOPY (EGD); Surgeon: Dewayne Apodaca MD;  [facility];   Service: Gastroenterology    REPLACEMENT TOTAL KNEE Right     TOTAL HIP ARTHROPLASTY Left 2005    TOTAL HIP ARTHROPLASTY Right 2007     Family History   Problem Relation Age of Onset    Heart attack Mother     Hypertension Mother     Heart disease Father     Kidney cancer Sister     Prostate cancer Brother      Social History     Social History    Marital status:      Spouse name: N/A    Number of children: N/A    Years of education: N/A     Occupational History    Not on file  Social History Main Topics    Smoking status: Current Every Day Smoker     Packs/day: 0 50     Types: Cigarettes    Smokeless tobacco: Never Used      Comment: trying to quit, currently smokes about 12 cigarettes a day     Alcohol use No    Drug use: No    Sexual activity: Not on file     Other Topics Concern    Not on file     Social History Narrative    No narrative on file       Current Outpatient Prescriptions:     atorvastatin (LIPITOR) 10 mg tablet, Take 10 mg by mouth daily, Disp: , Rfl:     Calcium Citrate-Vitamin D (CALCIUM + D PO), Take by mouth, Disp: , Rfl:     esomeprazole (NexIUM) 40 mg packet, Take 40 mg by mouth every morning before breakfast, Disp: , Rfl:     glucosamine-chondroitin 500-400 MG tablet, Take 1 tablet by mouth 3 (three) times a day, Disp: , Rfl:     irbesartan (AVAPRO) 75 mg tablet, Take 75 mg by mouth daily, Disp: , Rfl:     multivitamin (THERAGRAN) TABS, Take 1 tablet by mouth daily, Disp: , Rfl:     Psyllium (METAMUCIL FIBER PO), Take by mouth, Disp: , Rfl:     Tiotropium Bromide Monohydrate (SPIRIVA HANDIHALER IN), Inhale, Disp: , Rfl:     citalopram (CeleXA) 20 mg tablet, Take 20 mg by mouth daily, Disp: , Rfl:   No current facility-administered medications for this visit  No Known Allergies  Vitals:    08/09/18 0814   BP: 138/72   Pulse: 100   Resp: 22   Temp: 98 3 °F (36 8 °C)       Physical Exam   Constitutional: General appearance: The Patient is well-developed and well-nourished who appears the stated age in no acute distress  Patient is pleasant and talkative  HEENT:  Normocephalic  Sclerae are anicteric  Mucous membranes are moist  Neck is supple without adenopathy  No JVD  Chest: The lungs are clear to auscultation  Cardiac: Heart is regular rate  Abdomen: Abdomen is soft, non-tender, non-distended and without masses  Extremities: There is no clubbing or cyanosis  There is no edema  Symmetric  Neuro: Grossly nonfocal  Gait is normal      Lymphatic: No evidence of cervical adenopathy bilaterally  No evidence of axillary adenopathy bilaterally  No evidence of inguinal adenopathy bilaterally  Skin: Warm, anicteric  Psych:  Patient is pleasant and talkative  Breasts:      Pathology:      Labs:      Imaging  Nm Pet Ct Skull Base To Mid Thigh    Result Date: 7/18/2018  Narrative: PET/CT SCAN INDICATION: Newly diagnosed esophageal cancer  Initial staging exam  C15 5: Malignant neoplasm of lower third of esophagus MODIFIER: PI COMPARISON: CT chest and abdomen from 7/2/2018 CELL TYPE:  intramucosal carcinoma in Pelaez mucosa with high grade dysplasia (bx esophagogastric junction 6/20/18) TECHNIQUE:   8 3 mCi F-18-FDG administered IV  Multiplanar attenuation corrected and non attenuation corrected PET images are available for interpretation, and contiguous, low dose, axial CT sections were obtained from the skull base through the femurs   Oral contrast material (7 5 cc Omnipaque-240 in 300 cc water) was administered  Intravenous contrast material was not utilized  This examination, like all CT scans performed in the [facility], was performed utilizing techniques to minimize radiation dose exposure, including the use of iterative reconstruction and automated exposure control  Fasting serum glucose: 92 mg/dl FINDINGS: VISUALIZED BRAIN:   No acute abnormalities are seen  HEAD/NECK:   There is a physiologic distribution of FDG  No FDG avid cervical adenopathy is seen  CT images: Scattered sinus mucosal thickening noted   CHEST:   Crescentic focal FDG uptake noted along the anterior wall of the distal esophagus/proximal stomach, SUV max of 13 2  There is suggestion of associated wall thickening here on CT, see images 99 to 107 series 3  Based on the PET images this measures approximately 5 6 x 3 3 x 4 7 cm  No FDG avid lymph nodes  CT images: Large hiatal hernia  Moderate coronary artery calcifications noted  ABDOMEN:   No FDG avid soft tissue lesions are seen  CT images: 2 mm calculus noted in the left kidney lower pole  There is colonic diverticulosis  PELVIS: No FDG avid soft tissue lesions are seen  CT images: Unremarkable  OSSEOUS STRUCTURES: No FDG avid lesions are seen  CT images: Prior bilateral hip replacement  Impression: 1  Prominent focal FDG uptake at the anterior wall of the distal esophagus/proximal stomach compatible with hypermetabolic malignancy  2   No findings for hypermetabolic metastasis  Workstation performed: [id number]     I reviewed the above laboratory and imaging data  Discussion/Summary:  61-year-old female with a 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  This is in her intrathoracic stomach from her hiatal hernia  At this time she actually appears relatively fit and she may be able to tolerate a somewhat aggressive operation  My recommendation was for her to continue chemotherapy for at least 2-3 cycles and treat this as a gastric tumor  I will obtain pulmonary function studies now  If her pulmonary function studies would be prohibitive for surgical intervention, I would then had radiation after she completes her chemotherapy  If her pulmonary function studies are acceptable, surgical intervention would be an option  I discussed this may require total gastrectomy through an abdominal approach or combined abdominal/thoracic approach for esophagogastrectomy  I will call her with the results of the pulmonary function studies  I have told her to hold off on starting any radiation    I did discuss this with Dr Mallika Witt, and he will change chemotherapy accordingly  I will see her again in 2 months  She is agreeable to this plan  All of her questions were answered

## 2018-08-09 NOTE — PROGRESS NOTES
Surgical Oncology Consult       [address] [facility]ER [facility]ARE SURGICAL [facility]OGY [facility]  [address]  [place] [address] [address] [phone]    [place] Washburn  1935  [id number]  [address] [facility] CANCER [facility]  [address]  [place] [address] [address] [phone]    Diagnoses and all orders for this visit:    Cancer of abdominal esophagus ([facility] )  -     Ambulatory referral to Surgical Oncology  -     Complete pulmonary function test; Future  -     Case request operating room: INSERTION VENOUS PORT (PORT-A-CATH); Standing    Other orders  -     Incentive spirometry; Standing  -     Insert and maintain IV line; Standing  -     Void On-Call to O R ; Standing  -     Place sequential compression device; Standing  -     APTT  -     Protime-INR  -     ceFAZolin (ANCEF) IVPB (premix) 1,000 mg; Infuse 50 mL (1,000 mg total) into a venous catheter once         Chief Complaint   Patient presents with    Esophageal Cancer     Pt here for consult for esophageal cancer  She reports 30-35 lb weight loss over the last few months, as well as dysphagia, pain after eating, and occasional N/V  She has already had 1 chemo treatment  Return in about 2 months (around 10/9/2018)  Cancer of abdominal esophagus ([facility] )    6/20/2018 Biopsy     EGD :medium-sized friable tumor, that   measured 4 cm in size, in the distal third of the esophagus and GE   junction  PATH:-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  -Submitted P53 stain is focally positive           7/5/2018 Initial Diagnosis     Cancer of abdominal esophagus (HCC)          History of Present Illness:   40-year-old female who noticed a 20-25 lb weight loss over the last 3 months  She noticed a decrease in appetite with occasional nausea vomiting  She also noticed some dysphagia associated with this  She underwent EGD on 06/20/2018  This showed a tumor in the distal 3rd of the esophagus and the GE junction  Pathology revealed intramucosal carcinoma in Pelaze's mucosa  Follow-up chest CT which I reviewed on July 2, 2018 revealed a large hiatal hernia, narrowing of the distal esophagus but no evidence of metastatic disease  Follow-up PET-CT which I reviewed on July 18, 2018 revealed FDG uptake in the anterior proximal stomach  This appeared to be more of a tumor in the intrathoracic stomach from her hiatal hernia  She underwent an EUS since it was felt that the original biopsy may have been superficial   This was performed on July 27, 2018  There was a mass at the level of the GE junction, but the distal esophagus was normal   There was a large tumor found in the cardia  Biopsies revealed adenocarcinoma  This was T3N0 by EUS  The patient has had 1 cycle of chemotherapy and she comes in now to discuss further therapy  She is otherwise feeling well  She does have a history of COPD  She continues to smoke a half packs cigarettes a day  She gets minimally short of breath when climbing a flight of steps  Her weight is currently stable  Review of Systems  Complete ROS Surg Onc:   Constitutional: The patient denies new or recent history of general fatigue  Patient had recent weight loss and change in appetite  Eyes: No complaints of visual problems, no scleral icterus  ENT: no complaints of ear pain, no hoarseness, no difficulty swallowing,  no tinnitus and no new masses in head, oral cavity, or neck  Cardiovascular: No complaints of chest pain, no palpitations, no ankle edema  Respiratory: No complaints of  cough  She does have occasional shortness of breath  Gastrointestinal: No complaints of jaundice, no bloody stools, no pale stools  Genitourinary: No complaints of dysuria, no hematuria, no nocturia, no frequent urination, no urethral discharge     Musculoskeletal: No complaints of weakness, paralysis, joint stiffness or arthralgias  Integumentary: No complaints of rash, no new lesions  Neurological: No complaints of convulsions, no seizures, no dizziness  Hematologic/Lymphatic: No complaints of easy bruising  Endocrine:  No hot or cold intolerance  No polydipsia, polyphagia, or polyuria  Allergy/immunology:  No environmental allergies  No food allergies  Not immunocompromised  Skin:  No pallor or rash  No wound  Patient Active Problem List   Diagnosis    Cancer of abdominal esophagus (HCC)    COPD (chronic obstructive pulmonary disease) (HCC)    GERD (gastroesophageal reflux disease)    [facility] )    Asthma    Arthritis    Hyperlipidemia    Iron deficiency anemia     Past Medical History:   Diagnosis Date    Arthritis     Asthma     COPD (chronic obstructive pulmonary disease) ([place] [address] )     Ectopic pregnancy     GERD (gastroesophageal reflux disease)     Hiatal hernia     Hyperlipidemia     Hypertension     Iron deficiency anemia     Ulcerative colitis ([address] [place] [address] )      Past Surgical History:   Procedure Laterality Date    ANKLE GANGLION CYST EXCISION      ECTOPIC PREGNANCY SURGERY  1969    HIP SURGERY      JOINT REPLACEMENT Bilateral     hip    JOINT REPLACEMENT Right     knee    HI EDG US EXAM SURGICAL ALTER STOM DUODENUM/JEJUNUM Left 7/27/2018    Procedure: RADIAL ENDOSCOPIC U/S and EGD;  Surgeon: Jie Murry MD;  [facility]; Service: Gastroenterology    HI ESOPHAGOGASTRODUODENOSCOPY TRANSORAL DIAGNOSTIC N/A 6/20/2018    Procedure: ESOPHAGOGASTRODUODENOSCOPY (EGD); Surgeon: Oscar Mota MD;  [facility];   Service: Gastroenterology    REPLACEMENT TOTAL KNEE Right     TOTAL HIP ARTHROPLASTY Left 2005    TOTAL HIP ARTHROPLASTY Right 2007     Family History   Problem Relation Age of Onset    Heart attack Mother     Hypertension Mother     Heart disease Father     Kidney cancer Sister     Prostate cancer Brother      Social History     Social History    Marital status:      Spouse name: N/A    Number of children: N/A    Years of education: N/A     Occupational History    Not on file  Social History Main Topics    Smoking status: Current Every Day Smoker     Packs/day: 0 50     Types: Cigarettes    Smokeless tobacco: Never Used      Comment: trying to quit, currently smokes about 12 cigarettes a day     Alcohol use No    Drug use: No    Sexual activity: Not on file     Other Topics Concern    Not on file     Social History Narrative    No narrative on file       Current Outpatient Prescriptions:     atorvastatin (LIPITOR) 10 mg tablet, Take 10 mg by mouth daily, Disp: , Rfl:     Calcium Citrate-Vitamin D (CALCIUM + D PO), Take by mouth, Disp: , Rfl:     esomeprazole (NexIUM) 40 mg packet, Take 40 mg by mouth every morning before breakfast, Disp: , Rfl:     glucosamine-chondroitin 500-400 MG tablet, Take 1 tablet by mouth 3 (three) times a day, Disp: , Rfl:     irbesartan (AVAPRO) 75 mg tablet, Take 75 mg by mouth daily, Disp: , Rfl:     multivitamin (THERAGRAN) TABS, Take 1 tablet by mouth daily, Disp: , Rfl:     Psyllium (METAMUCIL FIBER PO), Take by mouth, Disp: , Rfl:     Tiotropium Bromide Monohydrate (SPIRIVA HANDIHALER IN), Inhale, Disp: , Rfl:     citalopram (CeleXA) 20 mg tablet, Take 20 mg by mouth daily, Disp: , Rfl:   No current facility-administered medications for this visit  No Known Allergies  Vitals:    08/09/18 0814   BP: 138/72   Pulse: 100   Resp: 22   Temp: 98 3 °F (36 8 °C)       Physical Exam   Constitutional: General appearance: The Patient is well-developed and well-nourished who appears the stated age in no acute distress  Patient is pleasant and talkative  HEENT:  Normocephalic  Sclerae are anicteric  Mucous membranes are moist  Neck is supple without adenopathy  No JVD  Chest: The lungs are clear to auscultation  Cardiac: Heart is regular rate  Abdomen: Abdomen is soft, non-tender, non-distended and without masses  Extremities: There is no clubbing or cyanosis  There is no edema  Symmetric  Neuro: Grossly nonfocal  Gait is normal      Lymphatic: No evidence of cervical adenopathy bilaterally  No evidence of axillary adenopathy bilaterally  No evidence of inguinal adenopathy bilaterally  Skin: Warm, anicteric  Psych:  Patient is pleasant and talkative  Breasts:      Pathology:      Labs:      Imaging  Nm Pet Ct Skull Base To Mid Thigh    Result Date: 7/18/2018  Narrative: PET/CT SCAN INDICATION: Newly diagnosed esophageal cancer  Initial staging exam  C15 5: Malignant neoplasm of lower third of esophagus MODIFIER: PI COMPARISON: CT chest and abdomen from 7/2/2018 CELL TYPE:  intramucosal carcinoma in Pelaez mucosa with high grade dysplasia (bx esophagogastric junction 6/20/18) TECHNIQUE:   8 3 mCi F-18-FDG administered IV  Multiplanar attenuation corrected and non attenuation corrected PET images are available for interpretation, and contiguous, low dose, axial CT sections were obtained from the skull base through the femurs   Oral contrast material (7 5 cc Omnipaque-240 in 300 cc water) was administered  Intravenous contrast material was not utilized  This examination, like all CT scans performed in the [facility], was performed utilizing techniques to minimize radiation dose exposure, including the use of iterative reconstruction and automated exposure control  Fasting serum glucose: 92 mg/dl FINDINGS: VISUALIZED BRAIN:   No acute abnormalities are seen  HEAD/NECK:   There is a physiologic distribution of FDG  No FDG avid cervical adenopathy is seen  CT images: Scattered sinus mucosal thickening noted  CHEST:   Crescentic focal FDG uptake noted along the anterior wall of the distal esophagus/proximal stomach, SUV max of 13 2    There is suggestion of associated wall thickening here on CT, see images 99 to 107 series 3   Based on the PET images this measures approximately 5 6 x 3 3 x 4 7 cm  No FDG avid lymph nodes  CT images: Large hiatal hernia  Moderate coronary artery calcifications noted  ABDOMEN:   No FDG avid soft tissue lesions are seen  CT images: 2 mm calculus noted in the left kidney lower pole  There is colonic diverticulosis  PELVIS: No FDG avid soft tissue lesions are seen  CT images: Unremarkable  OSSEOUS STRUCTURES: No FDG avid lesions are seen  CT images: Prior bilateral hip replacement  Impression: 1  Prominent focal FDG uptake at the anterior wall of the distal esophagus/proximal stomach compatible with hypermetabolic malignancy  2   No findings for hypermetabolic metastasis  Workstation performed: [id number]     I reviewed the above laboratory and imaging data  Discussion/Summary:  80-year-old female with a 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  This is in her intrathoracic stomach from her hiatal hernia  At this time she actually appears relatively fit and she may be able to tolerate a somewhat aggressive operation  My recommendation was for her to continue chemotherapy for at least 2-3 cycles and treat this as a gastric tumor  I will obtain pulmonary function studies now  If her pulmonary function studies would be prohibitive for surgical intervention, I would then had radiation after she completes her chemotherapy  If her pulmonary function studies are acceptable, surgical intervention would be an option  I discussed this may require total gastrectomy through an abdominal approach or combined abdominal/thoracic approach for esophagogastrectomy  I will call her with the results of the pulmonary function studies  I have told her to hold off on starting any radiation  I did discuss this with Dr Randy Burks, and he will change chemotherapy accordingly  She will need a port for chemotherapy    I explained the risks including bleeding, infection, need for further surgery, wound complications, port malfunction, DVT, and pneumothorax  Informed consent was obtained  We will schedule this at our earliest mutual convenience  I will see her again in 2 months  She is agreeable to this plan  All of her questions were answered

## 2018-08-09 NOTE — PROGRESS NOTES
Hematology/Oncology Outpatient Follow-up  Serge Gutiérrez 80 y o  female 1935 [id number]    Date:  8/9/2018      Assessment and Plan:  1  Cancer of abdominal esophagus [facility])  49-year-old female with newly diagnosed adenocarcinoma, GI primary  EGD performed on 06/27/2018, operative report notes a dominant tumor in the cardia with involvement of the EG junction  Also, there is possibility that this could be an EG junction malignancy with involvement of the cardia  Patient was seen by Dr Mirna Lock this morning  After reviewing patient's scans and operative reports, Dr Mirna Lock states tumor is in intrathoracic stomach from hiatal hernia  He feels that patient may be a surgical candidate  Patient will proceed with PFTs and then decision will be made  Therefore, it is recommended for patient to receive chemotherapy alone and not concurrent with radiation therapy  Patient will see the neoadjuvant chemotherapy which includes FOLT (oxaliplatin 85 mg/m2, leucovorin 200 mg/m2, 5FU 2600 mg/m2, 24 hour continuous infusion, and Taxotere 50 mg/m2 -- all given on day 1; followed by Neulasta support)  Reviewed side effects include but are not limited to cytopenias, alopecia, diarrhea, vomiting, constipation, nausea, peripheral neuropathy, electrolyte abnormalities, mucositis, muscle/joint pain, headache, liver toxicity, allergic reactions, fatigue      Dr Wilbur Da Silva reviewed the above with patient  Our chemotherapy nurse spent time reviewing this in detail provided patient with wrist written information in regards to the above  Patient has signed informed consent and was agreeable to proceed      Zofran was call into her pharmacy       - CBC and differential; Standing  - Comprehensive metabolic panel; Standing  - CBC and differential  - Comprehensive metabolic panel    HPI:       Cancer of abdominal esophagus ([address] )    6/20/2018 Biopsy     EGD :medium-sized friable tumor, that   measured 4 cm in size, in the distal third of the esophagus and GE   junction  PATH:-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  -Submitted P53 stain is focally positive           7/5/2018 Initial Diagnosis     Cancer of abdominal esophagus ([address] )        80year old female with newly diagnosed adenocarcinoma  She began having weight loss over the past 3 months, 20-25 lbs  She had some decrease in appetite       She had EGD on 6/20/18 with Dr Zak Giordano  This showed tumor at the distal third of the esophagus and the GE junction  Pathology showed intramucosal carcinoma in the Pelaez mucosa of the distinctive type with high-grade dysplasia, P 53 positive  This specimen was also sent to [facility] pathology department for consultation  They concurred with the above pathology      A CT of the chest abdomen pelvis was performed on 07/02/2018  This showed a large hiatal hernia, subtle narrowing of the distal esophagus at the GE junction  No evidence of metastatic disease in the chest or abdomen      Patient then had a PET/CT on 07/18/2018  This showed prominent focal uptake at the anterior wall of the distal esophagus/proximal stomach  No other hypermetabolism was noted      There was thought that original biopsy may have only been superficial   Therefore, patient was referred to have an additional EGD with EUS  This was performed by Dr Nichole Rutherford on 07/27/2018      There was a circumferential mass at the level of the EG junction  Distal esophagus was noted to be normal  No evidence of Pelaez's esophagus in the esophagus  A tumor was found in the cardia  Biopsies were taken from this  I handle hernia was also noted  The duodenum was normal  There was wall thickening at the distal 1-2 cm of the esophagus and the GE junction  The GE junction measured about 1 2 cm in thickness  Tumor staging by U/S was T3 N0    Of note, T staging was suboptimal due to the tangential position of the mass in the cardia      Pathology of the mass in the cardia showed adenocarcinoma      Due to underlying lung disease and continuing to smoke cigarettes, patient likely is not a good surgical candidate  Therefore, patient is presently being recommended for radiation and chemotherapy  She will also see surgeon in consultation      Patient was seen by Radiation Oncology on 07/30/2018  With or without surgery, she is being recommended to receive concurrent chemo on RT, (IMRT or 3D conformal RT)  Plan for 28 treatments total dose, 50 4 Gy      Tumor markers:   7/18/18 CEA 23 5     Interval history:    ROS: Review of Systems   Constitutional: Positive for appetite change (appetite is good)  Negative for chills, fever and unexpected weight change  HENT: Positive for trouble swallowing  Negative for mouth sores and nosebleeds  Respiratory: Negative for cough and shortness of breath  Cardiovascular: Negative for chest pain, palpitations and leg swelling  Gastrointestinal: Negative for abdominal pain, blood in stool, constipation, diarrhea, nausea and vomiting  Genitourinary: Negative for difficulty urinating, dysuria and hematuria  Musculoskeletal: Negative for arthralgias, joint swelling and myalgias  Skin: Negative  Neurological: Negative for dizziness, weakness, light-headedness, numbness and headaches  Hematological: Negative  Psychiatric/Behavioral: Negative          Past Medical History:   Diagnosis Date    Arthritis     Asthma     COPD (chronic obstructive pulmonary disease) [address] [address]     Ectopic pregnancy     GERD (gastroesophageal reflux disease)     Hiatal hernia     Hyperlipidemia     Hypertension     Iron deficiency anemia     Ulcerative colitis ([facility] [address] )        Past Surgical History:   Procedure Laterality Date    ANKLE GANGLION CYST EXCISION      ECTOPIC PREGNANCY SURGERY  1969    HIP SURGERY      JOINT REPLACEMENT Bilateral     hip    JOINT REPLACEMENT Right     knee    MA EDG US EXAM SURGICAL ALTER STOM DUODENUM/JEJUNUM Left 7/27/2018    Procedure: RADIAL ENDOSCOPIC U/S and EGD;  Surgeon: Pham Cardoso MD;  Location: [facility]; Service: Gastroenterology    [place] ESOPHAGOGASTRODUODENOSCOPY TRANSORAL DIAGNOSTIC N/A 6/20/2018    Procedure: ESOPHAGOGASTRODUODENOSCOPY (EGD); Surgeon: Tylor Chandler MD;  Location: [facility]; Service: Gastroenterology    REPLACEMENT TOTAL KNEE Right     TOTAL HIP ARTHROPLASTY Left 2005    TOTAL HIP ARTHROPLASTY Right 2007       Social History     Social History    Marital status:       Spouse name: N/A    Number of children: N/A    Years of education: N/A     Social History Main Topics    Smoking status: Current Every Day Smoker     Packs/day: 0 50     Types: Cigarettes    Smokeless tobacco: Never Used      Comment: trying to quit, currently smokes about 12 cigarettes a day     Alcohol use No    Drug use: No    Sexual activity: Not on file     Other Topics Concern    Not on file     Social History Narrative    No narrative on file       Family History   Problem Relation Age of Onset    Heart attack Mother     Hypertension Mother     Heart disease Father     Kidney cancer Sister     Prostate cancer Brother        No Known Allergies      Current Outpatient Prescriptions:     atorvastatin (LIPITOR) 10 mg tablet, Take 10 mg by mouth daily, Disp: , Rfl:     Calcium Citrate-Vitamin D (CALCIUM + D PO), Take by mouth, Disp: , Rfl:     citalopram (CeleXA) 20 mg tablet, Take 20 mg by mouth daily, Disp: , Rfl:     esomeprazole (NexIUM) 40 mg packet, Take 40 mg by mouth every morning before breakfast, Disp: , Rfl:     glucosamine-chondroitin 500-400 MG tablet, Take 1 tablet by mouth 3 (three) times a day, Disp: , Rfl:     irbesartan (AVAPRO) 75 mg tablet, Take 75 mg by mouth daily, Disp: , Rfl:     multivitamin (THERAGRAN) TABS, Take 1 tablet by mouth daily, Disp: , Rfl:     Psyllium (METAMUCIL FIBER PO), Take by mouth, Disp: , Rfl:    Tiotropium Bromide Monohydrate (SPIRIVA HANDIHALER IN), Inhale, Disp: , Rfl:   No current facility-administered medications for this visit  Physical Exam:  Resp 18   Ht 5' 1" (1 549 m)   Wt 70 9 kg (156 lb 6 4 oz)   BMI 29 55 kg/m²     Physical Exam   Constitutional: She is oriented to person, place, and time  She appears well-developed and well-nourished  No distress  HENT:   Head: Normocephalic and atraumatic  Eyes: Conjunctivae are normal  No scleral icterus  Neck: Normal range of motion  Neck supple  Cardiovascular: Normal rate, regular rhythm and normal heart sounds  No murmur heard  Pulmonary/Chest: Effort normal and breath sounds normal  No respiratory distress  Abdominal: Soft  There is no tenderness  Musculoskeletal: Normal range of motion  She exhibits no edema or tenderness  Lymphadenopathy:     She has no cervical adenopathy  Neurological: She is alert and oriented to person, place, and time  No cranial nerve deficit  Skin: Skin is warm and dry  Psychiatric: She has a normal mood and affect  Vitals reviewed  Labs:  Lab Results   Component Value Date    WBC 6 98 07/18/2018    HGB 13 8 07/18/2018    HCT 43 6 07/18/2018    MCV 92 07/18/2018     07/18/2018     Lab Results   Component Value Date     (L) 07/18/2018    K 3 9 07/18/2018    CL 99 (L) 07/18/2018    CO2 30 07/18/2018    ANIONGAP 5 07/18/2018    BUN 11 07/18/2018    CREATININE 0 70 07/18/2018    GLUCOSE 106 06/18/2017    GLUF 93 07/18/2018    CALCIUM 9 5 07/18/2018    AST 16 07/18/2018    ALT 15 07/18/2018    ALKPHOS 103 07/18/2018    PROT 7 6 07/18/2018    BILITOT 0 44 07/18/2018    EGFR 81 07/18/2018       Patient voiced understanding and agreement in the above discussion  Aware to contact our office with questions/symptoms in the interim

## 2018-08-10 DIAGNOSIS — C15.5 MALIGNANT NEOPLASM OF ABDOMINAL ESOPHAGUS (HCC): ICD-10-CM

## 2018-08-10 RX ORDER — LIDOCAINE AND PRILOCAINE 25; 25 MG/G; MG/G
CREAM TOPICAL
Qty: 30 G | Refills: 0 | OUTPATIENT
Start: 2018-08-10 | End: 2018-08-28 | Stop reason: HOSPADM

## 2018-08-13 ENCOUNTER — ANESTHESIA EVENT (OUTPATIENT)
Dept: PERIOP | Facility: HOSPITAL | Age: 83
End: 2018-08-13
Payer: COMMERCIAL

## 2018-08-13 ENCOUNTER — TELEPHONE (OUTPATIENT)
Dept: HEMATOLOGY ONCOLOGY | Facility: CLINIC | Age: 83
End: 2018-08-13

## 2018-08-13 ENCOUNTER — DOCUMENTATION (OUTPATIENT)
Dept: INFUSION CENTER | Facility: HOSPITAL | Age: 83
End: 2018-08-13

## 2018-08-13 ENCOUNTER — ANESTHESIA (OUTPATIENT)
Dept: PERIOP | Facility: HOSPITAL | Age: 83
End: 2018-08-13
Payer: COMMERCIAL

## 2018-08-13 ENCOUNTER — HOSPITAL ENCOUNTER (OUTPATIENT)
Facility: HOSPITAL | Age: 83
Setting detail: OUTPATIENT SURGERY
Discharge: HOME | End: 2018-08-13
Attending: SURGERY | Admitting: SURGERY
Payer: COMMERCIAL

## 2018-08-13 ENCOUNTER — HOSPITAL ENCOUNTER (OUTPATIENT)
Dept: RADIOLOGY | Facility: HOSPITAL | Age: 83
Setting detail: OUTPATIENT SURGERY
Discharge: HOME | End: 2018-08-13
Payer: COMMERCIAL

## 2018-08-13 ENCOUNTER — APPOINTMENT (OUTPATIENT)
Dept: RADIOLOGY | Facility: HOSPITAL | Age: 83
End: 2018-08-13
Attending: SURGERY
Payer: COMMERCIAL

## 2018-08-13 VITALS
HEIGHT: 61 IN | BODY MASS INDEX: 29.45 KG/M2 | HEART RATE: 69 BPM | SYSTOLIC BLOOD PRESSURE: 130 MMHG | RESPIRATION RATE: 18 BRPM | OXYGEN SATURATION: 94 % | WEIGHT: 156 LBS | DIASTOLIC BLOOD PRESSURE: 75 MMHG | TEMPERATURE: 98 F

## 2018-08-13 DIAGNOSIS — C15.5 CANCER OF ABDOMINAL ESOPHAGUS (HCC): ICD-10-CM

## 2018-08-13 DIAGNOSIS — C15.5 CANCER OF ABDOMINAL ESOPHAGUS (HCC): Primary | ICD-10-CM

## 2018-08-13 LAB — SCAN RESULT: NORMAL

## 2018-08-13 PROCEDURE — 77001 FLUOROGUIDE FOR VEIN DEVICE: CPT

## 2018-08-13 PROCEDURE — 71045 X-RAY EXAM CHEST 1 VIEW: CPT

## 2018-08-13 PROCEDURE — C1788 PORT, INDWELLING, IMP: HCPCS | Performed by: SURGERY

## 2018-08-13 PROCEDURE — 36571 INSERT PICVAD CATH: CPT | Performed by: SURGERY

## 2018-08-13 DEVICE — PORT HIGH PROFILE 8FR KIT PRO-FUSE: Type: IMPLANTABLE DEVICE | Site: VEIN | Status: FUNCTIONAL

## 2018-08-13 RX ORDER — METOCLOPRAMIDE HYDROCHLORIDE 5 MG/ML
10 INJECTION INTRAMUSCULAR; INTRAVENOUS ONCE AS NEEDED
Status: DISCONTINUED | OUTPATIENT
Start: 2018-08-13 | End: 2018-08-13 | Stop reason: HOSPADM

## 2018-08-13 RX ORDER — ONDANSETRON 2 MG/ML
INJECTION INTRAMUSCULAR; INTRAVENOUS AS NEEDED
Status: DISCONTINUED | OUTPATIENT
Start: 2018-08-13 | End: 2018-08-13 | Stop reason: SURG

## 2018-08-13 RX ORDER — MORPHINE SULFATE 2 MG/ML
2 INJECTION, SOLUTION INTRAMUSCULAR; INTRAVENOUS
Status: DISCONTINUED | OUTPATIENT
Start: 2018-08-13 | End: 2018-08-13 | Stop reason: HOSPADM

## 2018-08-13 RX ORDER — PROPOFOL 10 MG/ML
INJECTION, EMULSION INTRAVENOUS AS NEEDED
Status: DISCONTINUED | OUTPATIENT
Start: 2018-08-13 | End: 2018-08-13 | Stop reason: SURG

## 2018-08-13 RX ORDER — MAGNESIUM HYDROXIDE 1200 MG/15ML
LIQUID ORAL AS NEEDED
Status: DISCONTINUED | OUTPATIENT
Start: 2018-08-13 | End: 2018-08-13 | Stop reason: HOSPADM

## 2018-08-13 RX ORDER — OXYCODONE HYDROCHLORIDE 5 MG/1
5 TABLET ORAL EVERY 4 HOURS PRN
Status: DISCONTINUED | OUTPATIENT
Start: 2018-08-13 | End: 2018-08-13 | Stop reason: HOSPADM

## 2018-08-13 RX ORDER — FENTANYL CITRATE 50 UG/ML
INJECTION, SOLUTION INTRAMUSCULAR; INTRAVENOUS AS NEEDED
Status: DISCONTINUED | OUTPATIENT
Start: 2018-08-13 | End: 2018-08-13 | Stop reason: SURG

## 2018-08-13 RX ORDER — FENTANYL CITRATE/PF 50 MCG/ML
25 SYRINGE (ML) INJECTION
Status: DISCONTINUED | OUTPATIENT
Start: 2018-08-13 | End: 2018-08-13 | Stop reason: HOSPADM

## 2018-08-13 RX ORDER — BUPIVACAINE HYDROCHLORIDE 2.5 MG/ML
INJECTION, SOLUTION EPIDURAL; INFILTRATION; INTRACAUDAL AS NEEDED
Status: DISCONTINUED | OUTPATIENT
Start: 2018-08-13 | End: 2018-08-13 | Stop reason: HOSPADM

## 2018-08-13 RX ORDER — EPHEDRINE SULFATE 50 MG/ML
INJECTION, SOLUTION INTRAVENOUS AS NEEDED
Status: DISCONTINUED | OUTPATIENT
Start: 2018-08-13 | End: 2018-08-13 | Stop reason: SURG

## 2018-08-13 RX ORDER — SODIUM CHLORIDE, SODIUM LACTATE, POTASSIUM CHLORIDE, CALCIUM CHLORIDE 600; 310; 30; 20 MG/100ML; MG/100ML; MG/100ML; MG/100ML
20 INJECTION, SOLUTION INTRAVENOUS CONTINUOUS
Status: DISCONTINUED | OUTPATIENT
Start: 2018-08-13 | End: 2018-08-13 | Stop reason: HOSPADM

## 2018-08-13 RX ORDER — ACETAMINOPHEN 325 MG/1
650 TABLET ORAL EVERY 4 HOURS PRN
Status: DISCONTINUED | OUTPATIENT
Start: 2018-08-13 | End: 2018-08-13 | Stop reason: HOSPADM

## 2018-08-13 RX ADMIN — EPHEDRINE SULFATE 10 MG: 50 INJECTION, SOLUTION INTRAMUSCULAR; INTRAVENOUS; SUBCUTANEOUS at 12:42

## 2018-08-13 RX ADMIN — OXYCODONE HYDROCHLORIDE 5 MG: 5 TABLET ORAL at 14:49

## 2018-08-13 RX ADMIN — FENTANYL CITRATE 25 MCG: 50 INJECTION, SOLUTION INTRAMUSCULAR; INTRAVENOUS at 13:31

## 2018-08-13 RX ADMIN — PROPOFOL 200 MG: 10 INJECTION, EMULSION INTRAVENOUS at 12:34

## 2018-08-13 RX ADMIN — CEFAZOLIN SODIUM 1000 MG: 1 SOLUTION INTRAVENOUS at 12:38

## 2018-08-13 RX ADMIN — DEXAMETHASONE SODIUM PHOSPHATE 6 MG: 10 INJECTION INTRAMUSCULAR; INTRAVENOUS at 12:50

## 2018-08-13 RX ADMIN — EPHEDRINE SULFATE 10 MG: 50 INJECTION, SOLUTION INTRAMUSCULAR; INTRAVENOUS; SUBCUTANEOUS at 12:45

## 2018-08-13 RX ADMIN — FENTANYL CITRATE 25 MCG: 50 INJECTION, SOLUTION INTRAMUSCULAR; INTRAVENOUS at 13:41

## 2018-08-13 RX ADMIN — SODIUM CHLORIDE, SODIUM LACTATE, POTASSIUM CHLORIDE, AND CALCIUM CHLORIDE: .6; .31; .03; .02 INJECTION, SOLUTION INTRAVENOUS at 12:04

## 2018-08-13 RX ADMIN — LIDOCAINE HYDROCHLORIDE 50 MG: 20 INJECTION, SOLUTION INTRAVENOUS at 12:34

## 2018-08-13 RX ADMIN — ONDANSETRON 4 MG: 2 INJECTION INTRAMUSCULAR; INTRAVENOUS at 12:50

## 2018-08-13 RX ADMIN — FENTANYL CITRATE 50 MCG: 50 INJECTION, SOLUTION INTRAMUSCULAR; INTRAVENOUS at 12:34

## 2018-08-13 RX ADMIN — FENTANYL CITRATE 25 MCG: 50 INJECTION, SOLUTION INTRAMUSCULAR; INTRAVENOUS at 13:36

## 2018-08-13 NOTE — TELEPHONE ENCOUNTER
Pt called  Confused and overwhelmed  Looking for someone to talk with  Possibly could benefit from VNA for medication education  She will be having a port placed today in IR  Will be in [facility] tomorrow  Provided her with your phone #   She can be reached at

## 2018-08-13 NOTE — ANESTHESIA POSTPROCEDURE EVALUATION
Post-Op Assessment Note      CV Status:  Stable    Mental Status:  Alert and awake    Hydration Status:  Euvolemic    PONV Controlled:  Controlled    Airway Patency:  Patent    Post Op Vitals Reviewed: Yes          Staff: CRNA           BP      Temp (!) (P) 97 1 °F (36 2 °C) (08/13/18 1320)    Pulse (P) 77 (08/13/18 1320)   Resp (P) 12 (08/13/18 1320)    SpO2

## 2018-08-13 NOTE — OP NOTE
OPERATIVE REPORT  PATIENT NAME: Treva Duverney    :  1935  MRN: [id number]  Pt Location: [facility] OR ROOM 07    SURGERY DATE: 2018    Surgeon(s) and Role:     Robert Guzman MD - Primary    Preop Diagnosis:  Cancer of abdominal esophagus ([address] ) [C15 5]    Post-Op Diagnosis Codes:     * Cancer of abdominal esophagus ([address] ) [C15 5]    Procedure(s) (LRB):  INSERTION VENOUS PORT (PORT-A-CATH) (N/A)    Specimen(s):  * No specimens in log *    Estimated Blood Loss:   Minimal    Drains:       Anesthesia Type:   General    Operative Indications:  Cancer of abdominal esophagus ([facility] [address] ) [C135]  79-year-old female with carcinoma of the gastric cardia  It was recommended that she undergo chemotherapy  She will need a port placed  Risks and benefits were explained  Informed consent was obtained  Patient was brought to the operating room  Operative Findings:  Catheter tip at the cavoatrial junction  Complications:   None    Procedure and Technique:  After identifying the patient, general anesthesia was induced  Patient was prepped and draped in the usual sterile fashion  A time-out was performed  An incision was made in the left deltopectoral groove  This was taken down to the cephalic vein  The cephalic vein was encircled and tied off distally with 2-0 silk suture  A 2-0 silk was placed proximally around the vein to prevent any backbleeding  The catheter was introduced through the vein and confirmed to be in the superior vena cava using fluoroscopy  A subcutaneous port pocket was created using sharp dissection  Excess fat was removed using sharp dissection  There was excellent hemostasis  Three 2-0 Prolene sutures were placed in the port pocket and attached to the port  Using fluoroscopy, the catheter was manipulated into the cavoatrial junction  Catheter was cut and attached to the port and the port was secured using the previously placed Prolene sutures   The port withdrew blood easily and flushed very easily  The proximal tie was placed around the catheter and vein to prevent any backbleeding  Fluoroscopy confirmed the catheter to be at the cavoatrial junction  Wound was now copiously irrigated there was excellent hemostasis  The incision was approximated with interrupted 3-0 Vicryl suture subcutaneously and a running 4 Monocryl suture in a subcuticular fashion  Steri-Strips were applied  Port was once again accessed and withdrew blood easily and flushed very easily and was ultimately flushed with the final Hep-Lock solution  The port was left accessed  Sterile dressings were applied  The patient was extubated  Patient tolerated the procedure well and was taken to the recovery room in stable condition  I was present and participated in all aspects of this procedure  A chest x-ray was pending at the time of this dictation         I was present for the entire procedure and A qualified resident physician was not available    Patient Disposition:  PACU     SIGNATURE: Ricardo Babcock MD  DATE: August 13, 2018  TIME: 1:21 PM

## 2018-08-13 NOTE — H&P (VIEW-ONLY)
Surgical Oncology Consult       [address] [facility] CANCER [facility]  [address] [facility] [address]  [place] [address] [facility] [address] [phone]    Vin Bear  1935  [id number]  [address] [facility] CANCER [facility]  [address] [facility] [id number] [phone]    Diagnoses and all orders for this visit:    Cancer of abdominal esophagus ([facility] 75 )  -     Ambulatory referral to Surgical Oncology  -     Complete pulmonary function test; Future  -     Case request operating room: INSERTION VENOUS PORT (PORT-A-CATH); Standing    Other orders  -     Incentive spirometry; Standing  -     Insert and maintain IV line; Standing  -     Void On-Call to O R ; Standing  -     Place sequential compression device; Standing  -     APTT  -     Protime-INR  -     ceFAZolin (ANCEF) IVPB (premix) 1,000 mg; Infuse 50 mL (1,000 mg total) into a venous catheter once         Chief Complaint   Patient presents with    Esophageal Cancer     Pt here for consult for esophageal cancer  She reports 30-35 lb weight loss over the last few months, as well as dysphagia, pain after eating, and occasional N/V  She has already had 1 chemo treatment  Return in about 2 months (around 10/9/2018)  Cancer of abdominal esophagus ([facility] )    6/20/2018 Biopsy     EGD :medium-sized friable tumor, that   measured 4 cm in size, in the distal third of the esophagus and GE   junction  PATH:-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  -Submitted P53 stain is focally positive           7/5/2018 Initial Diagnosis     Cancer of abdominal esophagus (HCC)          History of Present Illness:   68-year-old female who noticed a 20-25 lb weight loss over the last 3 months  She noticed a decrease in appetite with occasional nausea vomiting  She also noticed some dysphagia associated with this  She underwent EGD on 06/20/2018  This showed a tumor in the distal 3rd of the esophagus and the GE junction  Pathology revealed intramucosal carcinoma in Pelaez's mucosa  Follow-up chest CT which I reviewed on July 2, 2018 revealed a large hiatal hernia, narrowing of the distal esophagus but no evidence of metastatic disease  Follow-up PET-CT which I reviewed on July 18, 2018 revealed FDG uptake in the anterior proximal stomach  This appeared to be more of a tumor in the intrathoracic stomach from her hiatal hernia  She underwent an EUS since it was felt that the original biopsy may have been superficial   This was performed on July 27, 2018  There was a mass at the level of the GE junction, but the distal esophagus was normal   There was a large tumor found in the cardia  Biopsies revealed adenocarcinoma  This was T3N0 by EUS  The patient has had 1 cycle of chemotherapy and she comes in now to discuss further therapy  She is otherwise feeling well  She does have a history of COPD  She continues to smoke a half packs cigarettes a day  She gets minimally short of breath when climbing a flight of steps  Her weight is currently stable  Review of Systems  Complete ROS Surg Onc:   Constitutional: The patient denies new or recent history of general fatigue  Patient had recent weight loss and change in appetite  Eyes: No complaints of visual problems, no scleral icterus  ENT: no complaints of ear pain, no hoarseness, no difficulty swallowing,  no tinnitus and no new masses in head, oral cavity, or neck  Cardiovascular: No complaints of chest pain, no palpitations, no ankle edema  Respiratory: No complaints of  cough  She does have occasional shortness of breath  Gastrointestinal: No complaints of jaundice, no bloody stools, no pale stools  Genitourinary: No complaints of dysuria, no hematuria, no nocturia, no frequent urination, no urethral discharge     Musculoskeletal: No complaints of weakness, paralysis, joint stiffness or arthralgias  Integumentary: No complaints of rash, no new lesions  Neurological: No complaints of convulsions, no seizures, no dizziness  Hematologic/Lymphatic: No complaints of easy bruising  Endocrine:  No hot or cold intolerance  No polydipsia, polyphagia, or polyuria  Allergy/immunology:  No environmental allergies  No food allergies  Not immunocompromised  Skin:  No pallor or rash  No wound  Patient Active Problem List   Diagnosis    Cancer of abdominal esophagus (HCC)    COPD (chronic obstructive pulmonary disease) (HCC)    GERD (gastroesophageal reflux disease)    Ulcerative colitis ([facility] )    Asthma    Arthritis    Hyperlipidemia    Iron deficiency anemia     Past Medical History:   Diagnosis Date    Arthritis     Asthma     COPD (chronic obstructive pulmonary disease) ([place] [address] )     Ectopic pregnancy     GERD (gastroesophageal reflux disease)     Hiatal hernia     Hyperlipidemia     Hypertension     Iron deficiency anemia     Ulcerative colitis ([place] [address] )      Past Surgical History:   Procedure Laterality Date    ANKLE GANGLION CYST EXCISION      ECTOPIC PREGNANCY SURGERY  1969    HIP SURGERY      JOINT REPLACEMENT Bilateral     hip    JOINT REPLACEMENT Right     knee    OK EDG US EXAM SURGICAL ALTER STOM DUODENUM/JEJUNUM Left 7/27/2018    Procedure: RADIAL ENDOSCOPIC U/S and EGD;  Surgeon: Madai Jorge MD;  [facility]; Service: Gastroenterology    OK ESOPHAGOGASTRODUODENOSCOPY TRANSORAL DIAGNOSTIC N/A 6/20/2018    Procedure: ESOPHAGOGASTRODUODENOSCOPY (EGD); Surgeon: Medina Larson MD;  [facility]: [facility];   Service: Gastroenterology    REPLACEMENT TOTAL KNEE Right     TOTAL HIP ARTHROPLASTY Left 2005    TOTAL HIP ARTHROPLASTY Right 2007     Family History   Problem Relation Age of Onset    Heart attack Mother     Hypertension Mother     Heart disease Father     Kidney cancer Sister     Prostate cancer Brother      Social History     Social History    Marital status:      Spouse name: N/A    Number of children: N/A    Years of education: N/A     Occupational History    Not on file  Social History Main Topics    Smoking status: Current Every Day Smoker     Packs/day: 0 50     Types: Cigarettes    Smokeless tobacco: Never Used      Comment: trying to quit, currently smokes about 12 cigarettes a day     Alcohol use No    Drug use: No    Sexual activity: Not on file     Other Topics Concern    Not on file     Social History Narrative    No narrative on file       Current Outpatient Prescriptions:     atorvastatin (LIPITOR) 10 mg tablet, Take 10 mg by mouth daily, Disp: , Rfl:     Calcium Citrate-Vitamin D (CALCIUM + D PO), Take by mouth, Disp: , Rfl:     esomeprazole (NexIUM) 40 mg packet, Take 40 mg by mouth every morning before breakfast, Disp: , Rfl:     glucosamine-chondroitin 500-400 MG tablet, Take 1 tablet by mouth 3 (three) times a day, Disp: , Rfl:     irbesartan (AVAPRO) 75 mg tablet, Take 75 mg by mouth daily, Disp: , Rfl:     multivitamin (THERAGRAN) TABS, Take 1 tablet by mouth daily, Disp: , Rfl:     Psyllium (METAMUCIL FIBER PO), Take by mouth, Disp: , Rfl:     Tiotropium Bromide Monohydrate (SPIRIVA HANDIHALER IN), Inhale, Disp: , Rfl:     citalopram (CeleXA) 20 mg tablet, Take 20 mg by mouth daily, Disp: , Rfl:   No current facility-administered medications for this visit  No Known Allergies  Vitals:    08/09/18 0814   BP: 138/72   Pulse: 100   Resp: 22   Temp: 98 3 °F (36 8 °C)       Physical Exam   Constitutional: General appearance: The Patient is well-developed and well-nourished who appears the stated age in no acute distress  Patient is pleasant and talkative  HEENT:  Normocephalic  Sclerae are anicteric  Mucous membranes are moist  Neck is supple without adenopathy  No JVD  Chest: The lungs are clear to auscultation  Cardiac: Heart is regular rate  Abdomen: Abdomen is soft, non-tender, non-distended and without masses  Extremities: There is no clubbing or cyanosis  There is no edema  Symmetric  Neuro: Grossly nonfocal  Gait is normal      Lymphatic: No evidence of cervical adenopathy bilaterally  No evidence of axillary adenopathy bilaterally  No evidence of inguinal adenopathy bilaterally  Skin: Warm, anicteric  Psych:  Patient is pleasant and talkative  Breasts:      Pathology:      Labs:      Imaging  Nm Pet Ct Skull Base To Mid Thigh    Result Date: 7/18/2018  Narrative: PET/CT SCAN INDICATION: Newly diagnosed esophageal cancer  Initial staging exam  C15 5: Malignant neoplasm of lower third of esophagus MODIFIER: PI COMPARISON: CT chest and abdomen from 7/2/2018 CELL TYPE:  intramucosal carcinoma in Pelaez mucosa with high grade dysplasia (bx esophagogastric junction 6/20/18) TECHNIQUE:   8 3 mCi F-18-FDG administered IV  Multiplanar attenuation corrected and non attenuation corrected PET images are available for interpretation, and contiguous, low dose, axial CT sections were obtained from the skull base through the femurs   Oral contrast material (7 5 cc Omnipaque-240 in 300 cc water) was administered  Intravenous contrast material was not utilized  This examination, like all CT scans performed in the [facility], was performed utilizing techniques to minimize radiation dose exposure, including the use of iterative reconstruction and automated exposure control  Fasting serum glucose: 92 mg/dl FINDINGS: VISUALIZED BRAIN:   No acute abnormalities are seen  HEAD/NECK:   There is a physiologic distribution of FDG  No FDG avid cervical adenopathy is seen  CT images: Scattered sinus mucosal thickening noted  CHEST:   Crescentic focal FDG uptake noted along the anterior wall of the distal esophagus/proximal stomach, SUV max of 13 2    There is suggestion of associated wall thickening here on CT, see images 99 to 107 series 3   Based on the PET images this measures approximately 5 6 x 3 3 x 4 7 cm  No FDG avid lymph nodes  CT images: Large hiatal hernia  Moderate coronary artery calcifications noted  ABDOMEN:   No FDG avid soft tissue lesions are seen  CT images: 2 mm calculus noted in the left kidney lower pole  There is colonic diverticulosis  PELVIS: No FDG avid soft tissue lesions are seen  CT images: Unremarkable  OSSEOUS STRUCTURES: No FDG avid lesions are seen  CT images: Prior bilateral hip replacement  Impression: 1  Prominent focal FDG uptake at the anterior wall of the distal esophagus/proximal stomach compatible with hypermetabolic malignancy  2   No findings for hypermetabolic metastasis  Workstation performed: [id number]     I reviewed the above laboratory and imaging data  Discussion/Summary:  49-year-old female with a 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  This is in her intrathoracic stomach from her hiatal hernia  At this time she actually appears relatively fit and she may be able to tolerate a somewhat aggressive operation  My recommendation was for her to continue chemotherapy for at least 2-3 cycles and treat this as a gastric tumor  I will obtain pulmonary function studies now  If her pulmonary function studies would be prohibitive for surgical intervention, I would then had radiation after she completes her chemotherapy  If her pulmonary function studies are acceptable, surgical intervention would be an option  I discussed this may require total gastrectomy through an abdominal approach or combined abdominal/thoracic approach for esophagogastrectomy  I will call her with the results of the pulmonary function studies  I have told her to hold off on starting any radiation  I did discuss this with Dr Wilbur Da Silva, and he will change chemotherapy accordingly  She will need a port for chemotherapy    I explained the risks including bleeding, infection, need for further surgery, wound complications, port malfunction, DVT, and pneumothorax  Informed consent was obtained  We will schedule this at our earliest mutual convenience  I will see her again in 2 months  She is agreeable to this plan  All of her questions were answered

## 2018-08-13 NOTE — DISCHARGE INSTRUCTIONS
POST-OPERATIVE WOUND CARE INSTRUCTIONS    Your wound is closed with:   Sterile strips-white pieces of tape that hold your incision together       Wound care:   Leave dressing on  The needle and dressing will be removed after chemotherapy tomorrow  You may shower using soap and water to clean your wound  Gently pat it dry  You may redress your wound for comfort as needed  Activity:   Did not perform any heavy lifting or strenuous physical activity for 7 days  Your activity restrictions will be reevaluated at your postoperative visit  Medications: You may resume all your preoperative medications and diet  Pain medication as directed on the prescription given in the office  Other:   May use ice on the wound for 24-48 hours as needed for comfort  Call the office at [phone] if you have any of the followin  Redness, swelling, heat, unusual drainage or heavy bleeding from the wound     2  Fever greater than 101°F    3  Pain not relieved by the prescribed pain medication

## 2018-08-13 NOTE — ANESTHESIA PREPROCEDURE EVALUATION
Review of Systems/Medical History  Patient summary reviewed  Chart reviewed      Cardiovascular  Hyperlipidemia, Hypertension controlled,    Pulmonary       GI/Hepatic    GERD ,  Hiatal hernia,             Endo/Other     GYN       Hematology  Anemia anemia of chronic disease,     Musculoskeletal    Arthritis     Neurology   Psychology           Physical Exam    Airway    Mallampati score: I  TM Distance: >3 FB  Neck ROM: full     Dental       Cardiovascular      Pulmonary      Other Findings        Anesthesia Plan  ASA Score- 3     Anesthesia Type- general and IV sedation with anesthesia with ASA Monitors  Additional Monitors:   Airway Plan: ETT and LMA  Plan Factors-    Induction- intravenous  Postoperative Plan-     Informed Consent- Anesthetic plan and risks discussed with patient  I personally reviewed this patient with the CRNA  Discussed and agreed on the Anesthesia Plan with the CRNA  Delgado Egan

## 2018-08-13 NOTE — SOCIAL WORK
MSW received referral from Sanjeev Arias, Nurse Navigator, as well as Aure Denson, RN, both having concerns for pt and her well being  MSW called pt's sister, Constance Dale and discussed the pt's case  Constance Dale explained that the pt is  , has no children and an extremely limited support system  Constance Dale lives in [place] for most of the year and lives in [place] in the summer months  As she travels through to her summer home, she stops to visit her sister  This year, she stopped and learned that her sister was ill wand received the cancer diagnosis  Constance Dale planned to be with her sister and was staying at her sons home in [place]  When she went to take her  home, he had a stroke and he has been in the hospital at [place] since that time  Constance Dale described feeling torn between staying with her  or being with her sister and she began to cry as she verbalized this tension  MSW provided emotional support and discussed safety of the pt with Constance Dale shared that she does not feel her sister is safe and when she starts chemo is concerned for how she will feel or who will care for her  MSW discussed calling the Area Agency on [address] was very much on favor of that as she strongly believes that her sister needs to be in "some type of care "  Finances were briefly discussed and Constance Dale states that while her sister has enough to sustain her monthly bills, she does not have enough to pay out of pocket for private duty caregivers  MSW did make a referral to the [facility] at [phone]  An assessment to be scheduled to determine of the pt will qualify for any services  MSW will plan to see pt in the infusion center on Wednesday

## 2018-08-14 ENCOUNTER — HOSPITAL ENCOUNTER (OUTPATIENT)
Dept: INFUSION CENTER | Facility: HOSPITAL | Age: 83
Discharge: HOME | End: 2018-08-14
Payer: COMMERCIAL

## 2018-08-14 VITALS
HEART RATE: 72 BPM | HEIGHT: 62 IN | DIASTOLIC BLOOD PRESSURE: 59 MMHG | WEIGHT: 156.97 LBS | RESPIRATION RATE: 20 BRPM | BODY MASS INDEX: 28.89 KG/M2 | SYSTOLIC BLOOD PRESSURE: 112 MMHG | TEMPERATURE: 97.8 F

## 2018-08-14 DIAGNOSIS — C15.5 CANCER OF ABDOMINAL ESOPHAGUS (HCC): ICD-10-CM

## 2018-08-14 DIAGNOSIS — C15.9 MALIGNANT NEOPLASM OF ESOPHAGUS, UNSPECIFIED LOCATION (HCC): Primary | ICD-10-CM

## 2018-08-14 DIAGNOSIS — C15.5 MALIGNANT NEOPLASM OF ABDOMINAL ESOPHAGUS (HCC): ICD-10-CM

## 2018-08-14 LAB
ALBUMIN SERPL BCP-MCNC: 3.1 G/DL (ref 3.5–5)
ALP SERPL-CCNC: 89 U/L (ref 46–116)
ALT SERPL W P-5'-P-CCNC: 17 U/L (ref 12–78)
ANION GAP SERPL CALCULATED.3IONS-SCNC: 6 MMOL/L (ref 4–13)
AST SERPL W P-5'-P-CCNC: 16 U/L (ref 5–45)
BASOPHILS # BLD AUTO: 0.01 THOUSANDS/ΜL (ref 0–0.1)
BASOPHILS NFR BLD AUTO: 0 % (ref 0–1)
BILIRUB SERPL-MCNC: 0.38 MG/DL (ref 0.2–1)
BUN SERPL-MCNC: 11 MG/DL (ref 5–25)
CALCIUM SERPL-MCNC: 9.3 MG/DL (ref 8.3–10.1)
CHLORIDE SERPL-SCNC: 98 MMOL/L (ref 100–108)
CO2 SERPL-SCNC: 28 MMOL/L (ref 21–32)
CREAT SERPL-MCNC: 0.68 MG/DL (ref 0.6–1.3)
EOSINOPHIL # BLD AUTO: 0.01 THOUSAND/ΜL (ref 0–0.61)
EOSINOPHIL NFR BLD AUTO: 0 % (ref 0–6)
ERYTHROCYTE [DISTWIDTH] IN BLOOD BY AUTOMATED COUNT: 12.8 % (ref 11.6–15.1)
GFR SERPL CREATININE-BSD FRML MDRD: 82 ML/MIN/1.73SQ M
GLUCOSE SERPL-MCNC: 115 MG/DL (ref 65–140)
HCT VFR BLD AUTO: 33.7 % (ref 34.8–46.1)
HGB BLD-MCNC: 10.7 G/DL (ref 11.5–15.4)
IMM GRANULOCYTES # BLD AUTO: 0.05 THOUSAND/UL (ref 0–0.2)
IMM GRANULOCYTES NFR BLD AUTO: 1 % (ref 0–2)
LYMPHOCYTES # BLD AUTO: 0.86 THOUSANDS/ΜL (ref 0.6–4.47)
LYMPHOCYTES NFR BLD AUTO: 9 % (ref 14–44)
MCH RBC QN AUTO: 29.2 PG (ref 26.8–34.3)
MCHC RBC AUTO-ENTMCNC: 31.8 G/DL (ref 31.4–37.4)
MCV RBC AUTO: 92 FL (ref 82–98)
MONOCYTES # BLD AUTO: 0.6 THOUSAND/ΜL (ref 0.17–1.22)
MONOCYTES NFR BLD AUTO: 7 % (ref 4–12)
NEUTROPHILS # BLD AUTO: 7.71 THOUSANDS/ΜL (ref 1.85–7.62)
NEUTS SEG NFR BLD AUTO: 83 % (ref 43–75)
NRBC BLD AUTO-RTO: 0 /100 WBCS
PLATELET # BLD AUTO: 270 THOUSANDS/UL (ref 149–390)
PMV BLD AUTO: 10.2 FL (ref 8.9–12.7)
POTASSIUM SERPL-SCNC: 3.8 MMOL/L (ref 3.5–5.3)
PROT SERPL-MCNC: 7 G/DL (ref 6.4–8.2)
RBC # BLD AUTO: 3.66 MILLION/UL (ref 3.81–5.12)
SODIUM SERPL-SCNC: 132 MMOL/L (ref 136–145)
WBC # BLD AUTO: 9.24 THOUSAND/UL (ref 4.31–10.16)

## 2018-08-14 PROCEDURE — 96417 CHEMO IV INFUS EACH ADDL SEQ: CPT

## 2018-08-14 PROCEDURE — G0498 CHEMO EXTEND IV INFUS W/PUMP: HCPCS

## 2018-08-14 PROCEDURE — 96413 CHEMO IV INFUSION 1 HR: CPT

## 2018-08-14 PROCEDURE — 96367 TX/PROPH/DG ADDL SEQ IV INF: CPT

## 2018-08-14 PROCEDURE — 96415 CHEMO IV INFUSION ADDL HR: CPT

## 2018-08-14 PROCEDURE — 96368 THER/DIAG CONCURRENT INF: CPT

## 2018-08-14 PROCEDURE — 85025 COMPLETE CBC W/AUTO DIFF WBC: CPT

## 2018-08-14 PROCEDURE — 80053 COMPREHEN METABOLIC PANEL: CPT

## 2018-08-14 RX ORDER — SODIUM CHLORIDE 9 MG/ML
20 INJECTION, SOLUTION INTRAVENOUS ONCE
Status: COMPLETED | OUTPATIENT
Start: 2018-08-14 | End: 2018-08-14

## 2018-08-14 RX ORDER — DEXAMETHASONE 4 MG/1
TABLET ORAL
Qty: 5 TABLET | Refills: 0 | Status: ON HOLD | OUTPATIENT
Start: 2018-08-14 | End: 2018-08-27

## 2018-08-14 RX ORDER — DEXTROSE MONOHYDRATE 50 MG/ML
20 INJECTION, SOLUTION INTRAVENOUS ONCE
Status: COMPLETED | OUTPATIENT
Start: 2018-08-14 | End: 2018-08-14

## 2018-08-14 RX ADMIN — DOCETAXEL 85 MG: 20 INJECTION, SOLUTION, CONCENTRATE INTRAVENOUS at 10:56

## 2018-08-14 RX ADMIN — SODIUM CHLORIDE 150 MG: 0.9 INJECTION, SOLUTION INTRAVENOUS at 10:03

## 2018-08-14 RX ADMIN — DEXAMETHASONE SODIUM PHOSPHATE: 10 INJECTION, SOLUTION INTRAMUSCULAR; INTRAVENOUS at 09:42

## 2018-08-14 RX ADMIN — LEUCOVORIN CALCIUM 350 MG: 350 INJECTION, POWDER, LYOPHILIZED, FOR SOLUTION INTRAMUSCULAR; INTRAVENOUS at 12:00

## 2018-08-14 RX ADMIN — SODIUM CHLORIDE 20 ML/HR: 0.9 INJECTION, SOLUTION INTRAVENOUS at 09:42

## 2018-08-14 RX ADMIN — DEXTROSE 20 ML/HR: 5 SOLUTION INTRAVENOUS at 11:57

## 2018-08-14 RX ADMIN — OXALIPLATIN 150 MG: 5 INJECTION, SOLUTION, CONCENTRATE INTRAVENOUS at 12:01

## 2018-08-14 NOTE — PLAN OF CARE
Problem: SAFETY ADULT  Goal: Patient will remain free of falls  INTERVENTIONS:  - Assess patient frequently for physical needs  -  Identify cognitive and physical deficits and behaviors that affect risk of falls  -  Hodges fall precautions as indicated by assessment   - Educate patient/family on patient safety including physical limitations  - Instruct patient to call for assistance with activity based on assessment  - Modify environment to reduce risk of injury  - Consider OT/PT consult to assist with strengthening/mobility  Outcome: Progressing      Problem: DISCHARGE PLANNING  Goal: Discharge to home or other facility with appropriate resources  INTERVENTIONS:  - Identify barriers to discharge w/patient and caregiver  - Arrange for needed discharge resources and transportation as appropriate  - Identify discharge learning needs (meds, wound care, etc )  - Arrange for interpretive services to assist at discharge as needed  - Refer to Case Management Department for coordinating discharge planning if the patient needs post-hospital services based on physician/advanced practitioner order or complex needs related to functional status, cognitive ability, or social support system  Outcome: Progressing      Problem: Knowledge Deficit  Goal: Patient/family/caregiver demonstrates understanding of disease process, treatment plan, medications, and discharge instructions  Complete learning assessment and assess knowledge base    Interventions:  - Provide teaching at level of understanding  - Provide teaching via preferred learning methods  Outcome: Progressing

## 2018-08-14 NOTE — PROGRESS NOTES
Pt presents this morning for chemo and home pump for 46 hours starting today  Spent a period of time talking with patient regarding treatment  Pt was unaware of need for labs prior to start of treatment today, labs drawn this morning  Also verified per the order whether patient had taken Decadron prior to treatment  Pt states was unaware of needing any extra oral medications prior to chemo  Pt admittedly very confused regarding appointments, treatment and follow up  Spent time educating and reinforcing office based education  Pt states feels more confident at this point, however the thought of taking CADD pump home for 46 hours is very overwhelming  Pt states has very limited social support and is unsure if anyone can help  Walked patient through how to change the battery, although the technique is understood, the patient appears to have difficulty with opening and closing the pump battery container  Notified Leonardo Krueger RN and Dr Cotter How regarding these concerns and question regarding home support and care  VNA referral entered with hopes of having support for the pump, appointment reminders regarding chemo and lab work, and medication reconciliation / reminders

## 2018-08-14 NOTE — PROGRESS NOTES
Notified Manny Hines from [facility] regarding pts concerns with Pump   Maira instructed pt is to call [facility] if pump beeps at all

## 2018-08-14 NOTE — PROGRESS NOTES
CADD pump connected and running  Reviewed upcoming appts with patient  Updated Calendar to reflect 3pm disconnect and injection for 8/15 and 8/16  Pt accompanied with transport home  Will call [facility] if pump beeps

## 2018-08-15 ENCOUNTER — DOCUMENTATION (OUTPATIENT)
Dept: HEMATOLOGY ONCOLOGY | Facility: CLINIC | Age: 83
End: 2018-08-15

## 2018-08-15 ENCOUNTER — TELEPHONE (OUTPATIENT)
Dept: CARDIOLOGY CLINIC | Facility: CLINIC | Age: 83
End: 2018-08-15

## 2018-08-15 ENCOUNTER — HOSPITAL ENCOUNTER (OUTPATIENT)
Dept: INFUSION CENTER | Facility: HOSPITAL | Age: 83
Discharge: HOME | End: 2018-08-15

## 2018-08-15 ENCOUNTER — HOSPITAL ENCOUNTER (OUTPATIENT)
Dept: INFUSION CENTER | Facility: HOSPITAL | Age: 83
Discharge: HOME | End: 2018-08-15
Payer: COMMERCIAL

## 2018-08-15 ENCOUNTER — TELEPHONE (OUTPATIENT)
Dept: HEMATOLOGY ONCOLOGY | Facility: CLINIC | Age: 83
End: 2018-08-15

## 2018-08-15 RX ADMIN — Medication 300 UNITS: at 15:38

## 2018-08-15 NOTE — SOCIAL WORK
MSW met with the pt in the infusion center on this day  Pt was very bright and using humor as her coping mechanism  Pt appeared confused about her medication and was asking the RN questions, often repeating the same questions  Pt did take [facility] transport, although she does state that she still drives  MSW called pt's sister, Art Perez to let her know that pt had her pump d/c'd

## 2018-08-15 NOTE — PROGRESS NOTES
Spoke with STEVEN Valle regarding the patient  Bev Minaya has spoken to the patients sister a number of times regarding the patient and her situation living at home  Per the sister, the patient does not have a lot of disposable income, as she frequents the casino almost daily, and at one point did not have groceries in her fridge  Bev Minaya got the patient placed with meals on wheels to assist with that  Bev Minaya has reached out the the area on aging, in regards to her level of care at home and confusion with medication, as she knew the patient would not qualify for VNA services  Bev Minaya plans to meet with the patient today at the infusion center and will discuss and assess the patient more and follow up with the agency for aging

## 2018-08-15 NOTE — TELEPHONE ENCOUNTER
Talon, RN with [facility] did an assessment at the request of the Infusion Nurse and Seng Parekh indicated that pt isn't being admitted to  [facility] do to needs are beyond home care   na

## 2018-08-15 NOTE — TELEPHONE ENCOUNTER
Pt called, undergoing chemo currently with dr Rosalinda Gilmore if she needs upcoming f/u or if she can postpone?

## 2018-08-15 NOTE — PROGRESS NOTES
I just got a call from Shobonier, from [facility], who states since the patient is not homebound, they are unable to service her  Though, he felt she was unsafe at home  He felt the patient could benefit from admission for each chemotherapy  This would be very difficult since its 24 hours, every 2 weeks  I have left a voicemail for Wendy Cruz to see if she can offer some assistance on what we can do to keep the patient safe, but as it stands A will not accept the patient

## 2018-08-16 ENCOUNTER — HOSPITAL ENCOUNTER (OUTPATIENT)
Dept: INFUSION CENTER | Facility: HOSPITAL | Age: 83
Discharge: HOME | End: 2018-08-16
Payer: COMMERCIAL

## 2018-08-16 VITALS — TEMPERATURE: 98.4 F

## 2018-08-16 PROCEDURE — 96372 THER/PROPH/DIAG INJ SC/IM: CPT

## 2018-08-16 RX ADMIN — PEGFILGRASTIM 6 MG: 6 INJECTION SUBCUTANEOUS at 14:59

## 2018-08-17 ENCOUNTER — TELEPHONE (OUTPATIENT)
Dept: HEMATOLOGY ONCOLOGY | Facility: CLINIC | Age: 83
End: 2018-08-17

## 2018-08-17 NOTE — TELEPHONE ENCOUNTER
Stated that this is her second bowel movement today  She wanted to let us know that it is very black  There is no blood in it though    She wanted to let us know

## 2018-08-17 NOTE — TELEPHONE ENCOUNTER
Stated that she is upset because she has all these side effects from the chemo  She wanted to make sure that there would be a DR on call in case she needed something on the weekends    LETICIA

## 2018-08-17 NOTE — TELEPHONE ENCOUNTER
Spoke with the patient and reviewed she has two very dark formed stools since receiving the Neulasta, but did not see any visible blood in the bowel movement  She expressed she is having a lot of nausea but no vomiting, no chills, no fever, but also reports not taking the Zofran  Encouraged the patient to start taking the Zofran every 8 hours starting now to see if this helps resolve her symptoms  Patient also reports having a weird feeling in her mouth that she was told to swish with baking soda and water  Patient stated she would do this  Additionally, patient is concerned if she feels worse over the weekend  I reviewed there is an on call service that connects patients if need be to the oncologist on call  Patient was appreciative and will call if she needs us

## 2018-08-20 ENCOUNTER — TELEPHONE (OUTPATIENT)
Dept: HEMATOLOGY ONCOLOGY | Facility: CLINIC | Age: 83
End: 2018-08-20

## 2018-08-20 NOTE — TELEPHONE ENCOUNTER
Pt called and stated that she has been vomiting and had diarrhea since she started her dexamethasone  She stated "I can't live like this anymore"   Please call pt back

## 2018-08-20 NOTE — TELEPHONE ENCOUNTER
Spoke with patient  She has not been taking her zofran because she forgot  We reviewed she needs to be taking consistently to treat her nausea and vomiting  She states she has been able to drink water however  She had one episode of diarrhea this AM  She took imodium and now diarrhea resolved  She is hungry but unable to eat due to nausea and vomiting  Emphasized she needs to take nausea medication every 8 hours consistently  If not beneficial, call back and we can increase the dose to 8 mg  She denies lightheadedness, dizziness, headache  Call with worsening symptoms

## 2018-08-22 ENCOUNTER — APPOINTMENT (OUTPATIENT)
Dept: LAB | Facility: HOSPITAL | Age: 83
DRG: 393 | End: 2018-08-22
Payer: COMMERCIAL

## 2018-08-22 DIAGNOSIS — C15.5 CANCER OF ABDOMINAL ESOPHAGUS (HCC): ICD-10-CM

## 2018-08-22 LAB
ALBUMIN SERPL BCP-MCNC: 2.9 G/DL (ref 3.5–5)
ALP SERPL-CCNC: 107 U/L (ref 46–116)
ALT SERPL W P-5'-P-CCNC: 16 U/L (ref 12–78)
ANION GAP SERPL CALCULATED.3IONS-SCNC: 7 MMOL/L (ref 4–13)
AST SERPL W P-5'-P-CCNC: 13 U/L (ref 5–45)
BASOPHILS # BLD MANUAL: 0 THOUSAND/UL (ref 0–0.1)
BASOPHILS NFR MAR MANUAL: 0 % (ref 0–1)
BILIRUB SERPL-MCNC: 0.31 MG/DL (ref 0.2–1)
BUN SERPL-MCNC: 14 MG/DL (ref 5–25)
CALCIUM SERPL-MCNC: 9.4 MG/DL (ref 8.3–10.1)
CHLORIDE SERPL-SCNC: 96 MMOL/L (ref 100–108)
CO2 SERPL-SCNC: 29 MMOL/L (ref 21–32)
CREAT SERPL-MCNC: 0.88 MG/DL (ref 0.6–1.3)
EOSINOPHIL # BLD MANUAL: 0 THOUSAND/UL (ref 0–0.4)
EOSINOPHIL NFR BLD MANUAL: 0 % (ref 0–6)
ERYTHROCYTE [DISTWIDTH] IN BLOOD BY AUTOMATED COUNT: 12.9 % (ref 11.6–15.1)
GFR SERPL CREATININE-BSD FRML MDRD: 61 ML/MIN/1.73SQ M
GLUCOSE P FAST SERPL-MCNC: 100 MG/DL (ref 65–99)
HCT VFR BLD AUTO: 35.8 % (ref 34.8–46.1)
HGB BLD-MCNC: 11.8 G/DL (ref 11.5–15.4)
LYMPHOCYTES # BLD AUTO: 14 % (ref 14–44)
LYMPHOCYTES # BLD AUTO: 2.48 THOUSAND/UL (ref 0.6–4.47)
MCH RBC QN AUTO: 29.6 PG (ref 26.8–34.3)
MCHC RBC AUTO-ENTMCNC: 33 G/DL (ref 31.4–37.4)
MCV RBC AUTO: 90 FL (ref 82–98)
MONOCYTES # BLD AUTO: 1.95 THOUSAND/UL (ref 0–1.22)
MONOCYTES NFR BLD: 11 % (ref 4–12)
NEUTROPHILS # BLD MANUAL: 13.13 THOUSAND/UL (ref 1.85–7.62)
NEUTS BAND NFR BLD MANUAL: 3 % (ref 0–8)
NEUTS SEG NFR BLD AUTO: 71 % (ref 43–75)
NRBC BLD AUTO-RTO: 0 /100 WBCS
PLATELET # BLD AUTO: 231 THOUSANDS/UL (ref 149–390)
PLATELET BLD QL SMEAR: ADEQUATE
PMV BLD AUTO: 11.2 FL (ref 8.9–12.7)
POIKILOCYTOSIS BLD QL SMEAR: PRESENT
POLYCHROMASIA BLD QL SMEAR: PRESENT
POTASSIUM SERPL-SCNC: 3.4 MMOL/L (ref 3.5–5.3)
PROMYELOCYTES NFR BLD MANUAL: 1 % (ref 0–0)
PROT SERPL-MCNC: 6.7 G/DL (ref 6.4–8.2)
RBC # BLD AUTO: 3.98 MILLION/UL (ref 3.81–5.12)
RBC MORPH BLD: PRESENT
SODIUM SERPL-SCNC: 132 MMOL/L (ref 136–145)
TOXIC GRANULES BLD QL SMEAR: PRESENT
WBC # BLD AUTO: 17.74 THOUSAND/UL (ref 4.31–10.16)

## 2018-08-22 PROCEDURE — 85007 BL SMEAR W/DIFF WBC COUNT: CPT

## 2018-08-22 PROCEDURE — 80053 COMPREHEN METABOLIC PANEL: CPT

## 2018-08-22 PROCEDURE — 85027 COMPLETE CBC AUTOMATED: CPT

## 2018-08-22 PROCEDURE — 36415 COLL VENOUS BLD VENIPUNCTURE: CPT

## 2018-08-23 ENCOUNTER — TELEPHONE (OUTPATIENT)
Dept: SURGICAL ONCOLOGY | Facility: CLINIC | Age: 83
End: 2018-08-23

## 2018-08-24 ENCOUNTER — APPOINTMENT (INPATIENT)
Dept: RADIOLOGY | Facility: HOSPITAL | Age: 83
DRG: 393 | End: 2018-08-24
Payer: COMMERCIAL

## 2018-08-24 ENCOUNTER — HOSPITAL ENCOUNTER (OUTPATIENT)
Dept: INFUSION CENTER | Facility: HOSPITAL | Age: 83
Discharge: HOME | DRG: 393 | End: 2018-08-24
Payer: COMMERCIAL

## 2018-08-24 ENCOUNTER — TELEPHONE (OUTPATIENT)
Dept: SURGICAL ONCOLOGY | Facility: CLINIC | Age: 83
End: 2018-08-24

## 2018-08-24 ENCOUNTER — HOSPITAL ENCOUNTER (INPATIENT)
Facility: HOSPITAL | Age: 83
LOS: 4 days | Discharge: HOSPICE | DRG: 393 | End: 2018-08-28
Attending: INTERNAL MEDICINE | Admitting: INTERNAL MEDICINE
Payer: COMMERCIAL

## 2018-08-24 ENCOUNTER — OFFICE VISIT (OUTPATIENT)
Dept: HEMATOLOGY ONCOLOGY | Facility: CLINIC | Age: 83
End: 2018-08-24
Payer: COMMERCIAL

## 2018-08-24 VITALS
TEMPERATURE: 98.6 F | RESPIRATION RATE: 18 BRPM | WEIGHT: 151 LBS | HEART RATE: 98 BPM | DIASTOLIC BLOOD PRESSURE: 59 MMHG | HEIGHT: 62 IN | OXYGEN SATURATION: 95 % | BODY MASS INDEX: 27.79 KG/M2 | SYSTOLIC BLOOD PRESSURE: 90 MMHG

## 2018-08-24 VITALS
TEMPERATURE: 98 F | HEART RATE: 81 BPM | SYSTOLIC BLOOD PRESSURE: 121 MMHG | DIASTOLIC BLOOD PRESSURE: 68 MMHG | RESPIRATION RATE: 18 BRPM

## 2018-08-24 DIAGNOSIS — E86.0 DEHYDRATION: ICD-10-CM

## 2018-08-24 DIAGNOSIS — C15.5 MALIGNANT NEOPLASM OF ABDOMINAL ESOPHAGUS (HCC): ICD-10-CM

## 2018-08-24 DIAGNOSIS — C15.5 CANCER OF ABDOMINAL ESOPHAGUS (HCC): Primary | ICD-10-CM

## 2018-08-24 DIAGNOSIS — C16.9 GASTRIC CARCINOMA (HCC): Primary | ICD-10-CM

## 2018-08-24 DIAGNOSIS — C15.9 ESOPHAGEAL CARCINOMA (HCC): ICD-10-CM

## 2018-08-24 DIAGNOSIS — R11.2 INTRACTABLE VOMITING WITH NAUSEA, UNSPECIFIED VOMITING TYPE: ICD-10-CM

## 2018-08-24 DIAGNOSIS — R19.7 DIARRHEA, UNSPECIFIED TYPE: ICD-10-CM

## 2018-08-24 DIAGNOSIS — Z51.5 HOSPICE CARE PATIENT: ICD-10-CM

## 2018-08-24 LAB
ANION GAP SERPL CALCULATED.3IONS-SCNC: 9 MMOL/L (ref 4–13)
BASOPHILS # BLD MANUAL: 0 THOUSAND/UL (ref 0–0.1)
BASOPHILS NFR MAR MANUAL: 0 % (ref 0–1)
BUN SERPL-MCNC: 14 MG/DL (ref 5–25)
CALCIUM SERPL-MCNC: 9.3 MG/DL (ref 8.3–10.1)
CHLORIDE SERPL-SCNC: 97 MMOL/L (ref 100–108)
CO2 SERPL-SCNC: 22 MMOL/L (ref 21–32)
CREAT SERPL-MCNC: 0.69 MG/DL (ref 0.6–1.3)
EOSINOPHIL # BLD MANUAL: 0 THOUSAND/UL (ref 0–0.4)
EOSINOPHIL NFR BLD MANUAL: 0 % (ref 0–6)
ERYTHROCYTE [DISTWIDTH] IN BLOOD BY AUTOMATED COUNT: 13 % (ref 11.6–15.1)
GFR SERPL CREATININE-BSD FRML MDRD: 81 ML/MIN/1.73SQ M
GIANT PLATELETS BLD QL SMEAR: PRESENT
GLUCOSE SERPL-MCNC: 144 MG/DL (ref 65–140)
HCT VFR BLD AUTO: 34.8 % (ref 34.8–46.1)
HGB BLD-MCNC: 11.8 G/DL (ref 11.5–15.4)
LYMPHOCYTES # BLD AUTO: 0.85 THOUSAND/UL (ref 0.6–4.47)
LYMPHOCYTES # BLD AUTO: 3 % (ref 14–44)
MCH RBC QN AUTO: 30.1 PG (ref 26.8–34.3)
MCHC RBC AUTO-ENTMCNC: 33.9 G/DL (ref 31.4–37.4)
MCV RBC AUTO: 89 FL (ref 82–98)
MONOCYTES # BLD AUTO: 1.14 THOUSAND/UL (ref 0–1.22)
MONOCYTES NFR BLD: 4 % (ref 4–12)
NEUTROPHILS # BLD MANUAL: 26.47 THOUSAND/UL (ref 1.85–7.62)
NEUTS SEG NFR BLD AUTO: 93 % (ref 43–75)
NRBC BLD AUTO-RTO: 0 /100 WBCS
PLATELET # BLD AUTO: 268 THOUSANDS/UL (ref 149–390)
PLATELET BLD QL SMEAR: ADEQUATE
PMV BLD AUTO: 10.2 FL (ref 8.9–12.7)
POIKILOCYTOSIS BLD QL SMEAR: PRESENT
POLYCHROMASIA BLD QL SMEAR: PRESENT
POTASSIUM SERPL-SCNC: 3.3 MMOL/L (ref 3.5–5.3)
RBC # BLD AUTO: 3.92 MILLION/UL (ref 3.81–5.12)
RBC MORPH BLD: PRESENT
SMUDGE CELLS BLD QL SMEAR: PRESENT
SODIUM SERPL-SCNC: 128 MMOL/L (ref 136–145)
TARGETS BLD QL SMEAR: PRESENT
TOXIC GRANULES BLD QL SMEAR: PRESENT
WBC # BLD AUTO: 28.46 THOUSAND/UL (ref 4.31–10.16)

## 2018-08-24 PROCEDURE — 96367 TX/PROPH/DG ADDL SEQ IV INF: CPT

## 2018-08-24 PROCEDURE — 80048 BASIC METABOLIC PNL TOTAL CA: CPT | Performed by: INTERNAL MEDICINE

## 2018-08-24 PROCEDURE — 96365 THER/PROPH/DIAG IV INF INIT: CPT

## 2018-08-24 PROCEDURE — 85027 COMPLETE CBC AUTOMATED: CPT | Performed by: INTERNAL MEDICINE

## 2018-08-24 PROCEDURE — 85007 BL SMEAR W/DIFF WBC COUNT: CPT | Performed by: INTERNAL MEDICINE

## 2018-08-24 PROCEDURE — 74022 RADEX COMPL AQT ABD SERIES: CPT

## 2018-08-24 PROCEDURE — 99215 OFFICE O/P EST HI 40 MIN: CPT | Performed by: INTERNAL MEDICINE

## 2018-08-24 PROCEDURE — 96361 HYDRATE IV INFUSION ADD-ON: CPT

## 2018-08-24 PROCEDURE — 99222 1ST HOSP IP/OBS MODERATE 55: CPT | Performed by: INTERNAL MEDICINE

## 2018-08-24 PROCEDURE — C9113 INJ PANTOPRAZOLE SODIUM, VIA: HCPCS | Performed by: INTERNAL MEDICINE

## 2018-08-24 RX ORDER — DEXTROSE AND SODIUM CHLORIDE 5; .9 G/100ML; G/100ML
400 INJECTION, SOLUTION INTRAVENOUS ONCE
Status: COMPLETED | OUTPATIENT
Start: 2018-08-24 | End: 2018-08-24

## 2018-08-24 RX ORDER — PANTOPRAZOLE SODIUM 40 MG/1
40 INJECTION, POWDER, FOR SOLUTION INTRAVENOUS
Status: DISCONTINUED | OUTPATIENT
Start: 2018-08-24 | End: 2018-08-27

## 2018-08-24 RX ORDER — METOPROLOL TARTRATE 5 MG/5ML
2.5 INJECTION INTRAVENOUS ONCE
Status: COMPLETED | OUTPATIENT
Start: 2018-08-25 | End: 2018-08-25

## 2018-08-24 RX ORDER — PANTOPRAZOLE SODIUM 20 MG/1
20 TABLET, DELAYED RELEASE ORAL ONCE
Status: DISCONTINUED | OUTPATIENT
Start: 2018-08-24 | End: 2018-08-27 | Stop reason: HOSPADM

## 2018-08-24 RX ORDER — ONDANSETRON 2 MG/ML
4 INJECTION INTRAMUSCULAR; INTRAVENOUS EVERY 6 HOURS PRN
Status: DISCONTINUED | OUTPATIENT
Start: 2018-08-24 | End: 2018-08-26

## 2018-08-24 RX ORDER — SODIUM CHLORIDE 9 MG/ML
100 INJECTION, SOLUTION INTRAVENOUS CONTINUOUS
Status: DISCONTINUED | OUTPATIENT
Start: 2018-08-24 | End: 2018-08-26

## 2018-08-24 RX ORDER — NICOTINE 21 MG/24HR
1 PATCH, TRANSDERMAL 24 HOURS TRANSDERMAL DAILY
Status: DISCONTINUED | OUTPATIENT
Start: 2018-08-25 | End: 2018-08-28 | Stop reason: HOSPADM

## 2018-08-24 RX ORDER — ATORVASTATIN CALCIUM 10 MG/1
10 TABLET, FILM COATED ORAL DAILY
Status: DISCONTINUED | OUTPATIENT
Start: 2018-08-25 | End: 2018-08-26

## 2018-08-24 RX ORDER — ACETAMINOPHEN 325 MG/1
650 TABLET ORAL EVERY 6 HOURS PRN
Status: DISCONTINUED | OUTPATIENT
Start: 2018-08-24 | End: 2018-08-28 | Stop reason: HOSPADM

## 2018-08-24 RX ORDER — MAGNESIUM HYDROXIDE/ALUMINUM HYDROXICE/SIMETHICONE 120; 1200; 1200 MG/30ML; MG/30ML; MG/30ML
30 SUSPENSION ORAL ONCE
Status: DISCONTINUED | OUTPATIENT
Start: 2018-08-24 | End: 2018-08-27 | Stop reason: HOSPADM

## 2018-08-24 RX ORDER — CITALOPRAM 20 MG/1
20 TABLET ORAL DAILY
Status: DISCONTINUED | OUTPATIENT
Start: 2018-08-25 | End: 2018-08-26

## 2018-08-24 RX ADMIN — SODIUM CHLORIDE 100 ML/HR: 0.9 INJECTION, SOLUTION INTRAVENOUS at 21:33

## 2018-08-24 RX ADMIN — ONDANSETRON 4 MG: 2 INJECTION INTRAMUSCULAR; INTRAVENOUS at 21:35

## 2018-08-24 RX ADMIN — DEXTROSE AND SODIUM CHLORIDE 400 ML/HR: 5; .9 INJECTION, SOLUTION INTRAVENOUS at 14:12

## 2018-08-24 RX ADMIN — DEXAMETHASONE SODIUM PHOSPHATE 10 MG: 10 INJECTION, SOLUTION INTRAMUSCULAR; INTRAVENOUS at 14:38

## 2018-08-24 RX ADMIN — PANTOPRAZOLE SODIUM 40 MG: 40 INJECTION, POWDER, FOR SOLUTION INTRAVENOUS at 21:34

## 2018-08-24 RX ADMIN — ONDANSETRON 8 MG: 2 INJECTION INTRAMUSCULAR; INTRAVENOUS at 14:12

## 2018-08-24 NOTE — PROGRESS NOTES
Pt here for hydration  She denies pain at her port site  Steristrips still on incision, peeled back at edge and slightly pink at incision line under steri strips  Started peripheral IV in her arm, did not use port  Grecia Marie RN aware

## 2018-08-24 NOTE — PLAN OF CARE
Problem: Potential for Falls  Goal: Patient will remain free of falls  INTERVENTIONS:  - Assess patient frequently for physical needs  -  Identify cognitive and physical deficits and behaviors that affect risk of falls    -  [address] fall precautions as indicated by assessment   - Educate patient/family on patient safety including physical limitations  - Instruct patient to call for assistance with activity based on assessment  - Modify environment to reduce risk of injury  - Consider OT/PT consult to assist with strengthening/mobility   Outcome: Progressing

## 2018-08-24 NOTE — PROGRESS NOTES
Pt vomited 4 times since she has been here  Willing to be admitted, states she was hoping Dr Sofia Church would do that  Discussed with Lizeth Quiñones and they will work on getting her a bed on [address]   PT agreeable

## 2018-08-24 NOTE — LETTER
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 based on EGD and EUS done by Dr Anatoliy Rg  Initial EGD was done in June 2018 by Dr Desirae Hernandez and that was superficial biopsy from distal esophagus/GE junction that showed 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  Patient's presentation was decreased appetite and weight loss  A CT of the chest abdomen pelvis was performed on 07/02/2018   This showed a large hiatal hernia, subtle narrowing of the distal esophagus at the GE junction   No evidence of metastatic disease in the chest or abdomen  Patient then had a PET/CT on 07/18/2018   This showed prominent focal uptake at the anterior wall of the distal esophagus/proximal stomach   No other hypermetabolism was noted  Patient was referred to have an additional EGD with EUS   This was performed by Dr Anatoliy Rg on 07/27/2018    There was a circumferential mass at the level of the EG junction   Distal esophagus was noted to be normal  No evidence of Pelaez's esophagus in the esophagus   A tumor was found in the cardia   Biopsies were taken from this    The duodenum was normal  There was wall thickening at the distal 1-2 cm of the esophagus and the GE junction   The GE junction measured about 1 2 cm in thickness   Tumor staging by U/S was T3 N0   Of note, T staging was suboptimal due to the tangential position of the mass in the cardia    Pathology of the mass in the cardia showed adenocarcinoma  Due to underlying lung disease and continuing to smoke cigarettes, patient likely is not a good surgical candidate  Initial plan was Taxol and carboplatin with concurrent radiation  Patient was evaluated by Dr Rosalinda Silva, surgical oncologist and he did not rule out surgery  The plan changed to [facility] T chemotherapy  Patient received 1 cycle of 5 FU, leucovorin, oxaliplatin and Taxotere chemotherapy and also Neulasta  Patient has nausea, vomiting, diarrhea and dehydration  She is eating and drinking poorly and is feeling weak and tired  No Known Allergies       Cancer of abdominal esophagus ([facility] [address] )    6/20/2018 Biopsy     EGD :medium-sized friable tumor, that   measured 4 cm in size, in the distal third of the esophagus and GE   junction  PATH:-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  -Submitted P53 stain is focally positive           7/5/2018 Initial Diagnosis     Cancer of abdominal esophagus (HCC)          ROS:  08/25/18 Reviewed 13 systems:  Presently no headaches, seizures, dizziness, diplopia, dysphagia, hoarseness, chest pain, palpitations, shortness of breath, cough, hemoptysis, abdominal pain,  melena, hematuria, fever, chills, bleeding, bone pains, skin rash, numbness,  claudication and gait problem  No frequent infections  Not unusually sensitive to heat or cold  No swelling of the ankles  No swollen glands  Patient is anxious       No GYN symptoms       Other symptoms are in HPI        BP 90/59 (BP Location: Right arm, Cuff Size: Large)   Pulse 98   Temp 98 6 °F (37 °C) (Tympanic)   Resp 18   Ht 5' 1 54" (1 563 m)   Wt 68 5 kg (151 lb)   SpO2 95%   BMI 28 03 kg/m²      Physical Exam:  Alert, oriented, not in distress, no icterus, no oral thrush, no palpable neck mass, clear lung fields, regular heart rate, systolic murmur, abdomen  soft and non tender, no palpable abdominal mass, no ascites, no edema of ankles, no calf tenderness, no focal neurological deficit, no skin rash, no palpable lymphadenopathy, good arterial pulses, no clubbing  Patient is anxious  Performance status 1      IMAGING:      LABS:  Results for orders placed or performed in visit on 08/22/18   CBC and differential   Result Value Ref Range    WBC 17 74 (H) 4 31 - 10 16 Thousand/uL    RBC 3 98 3 81 - 5 12 Million/uL    Hemoglobin 11 8 11 5 - 15 4 g/dL    Hematocrit 35 8 34 8 - 46 1 %    MCV 90 82 - 98 fL    MCH 29 6 26 8 - 34 3 pg    MCHC 33 0 31 4 - 37 4 g/dL    RDW 12 9 11 6 - 15 1 %    MPV 11 2 8 9 - 12 7 fL    Platelets 373 836 - 031 Thousands/uL    nRBC 0 /100 WBCs   Comprehensive metabolic panel   Result Value Ref Range    Sodium 132 (L) 136 - 145 mmol/L    Potassium 3 4 (L) 3 5 - 5 3 mmol/L    Chloride 96 (L) 100 - 108 mmol/L    CO2 29 21 - 32 mmol/L    Anion Gap 7 4 - 13 mmol/L    BUN 14 5 - 25 mg/dL    Creatinine 0 88 0 60 - 1 30 mg/dL    Glucose, Fasting 100 (H) 65 - 99 mg/dL    Calcium 9 4 8 3 - 10 1 mg/dL    AST 13 5 - 45 U/L    ALT 16 12 - 78 U/L    Alkaline Phosphatase 107 46 - 116 U/L    Total Protein 6 7 6 4 - 8 2 g/dL    Albumin 2 9 (L) 3 5 - 5 0 g/dL    Total Bilirubin 0 31 0 20 - 1 00 mg/dL    eGFR 61 ml/min/1 73sq m   Manual Differential(PHLEBS Do Not Order)   Result Value Ref Range    Segmented % 71 43 - 75 %    Bands % 3 0 - 8 %    Lymphocytes % 14 14 - 44 %    Monocytes % 11 4 - 12 %    Eosinophils % 0 0 - 6 %    Basophils % 0 0 - 1 %    Promyelocytes % 1 (H) 0 - 0 %    Absolute Neutrophils 13 13 (H) 1 85 - 7 62 Thousand/uL    Lymphocytes Absolute 2 48 0 60 - 4 47 Thousand/uL    Monocytes Absolute 1 95 (H) 0 00 - 1 22 Thousand/uL    Eosinophils Absolute 0 00 0 00 - 0 40 Thousand/uL    Basophils Absolute 0 00 0 00 - 0 10 Thousand/uL    Total Counted      Toxic Granulation Present     RBC Morphology Present     Poikilocytes Present     Polychromasia Present Platelet Estimate Adequate Adequate     Labs, Imaging, & Other studies:   All pertinent labs and imaging studies were personally reviewed    Lab Results   Component Value Date     08/25/2018    K 3 3 (L) 08/25/2018    CL 99 (L) 08/25/2018    CO2 28 08/25/2018    ANIONGAP 9 08/25/2018    BUN 12 08/25/2018    CREATININE 0 64 08/25/2018    GLUCOSE 106 08/25/2018    GLUF 100 (H) 08/22/2018    CALCIUM 8 3 08/25/2018    AST 13 08/22/2018    ALT 16 08/22/2018    ALKPHOS 107 08/22/2018    PROT 6 7 08/22/2018    BILITOT 0 31 08/22/2018    EGFR 83 08/25/2018     Lab Results   Component Value Date    WBC 30 26 (HH) 08/25/2018    HGB 10 4 (L) 08/25/2018    HCT 32 2 (L) 08/25/2018    MCV 91 08/25/2018     08/25/2018   No results found for: [address] [place] [address] and discussed with patient  Assessment and plan: 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 based on EGD and EUS done by Dr Regla Lewis  Initial EGD was done in June 2018 by Dr Bolivar Watkins and that was superficial biopsy from distal esophagus/GE junction that showed 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  Patient's presentation was decreased appetite and weight loss  A CT of the chest abdomen pelvis was performed on 07/02/2018   This showed a large hiatal hernia, subtle narrowing of the distal esophagus at the GE junction   No evidence of metastatic disease in the chest or abdomen  Patient then had a PET/CT on 07/18/2018   This showed prominent focal uptake at the anterior wall of the distal esophagus/proximal stomach   No other hypermetabolism was noted  Patient was referred to have an additional EGD with EUS   This was performed by Dr Regla Lewis on 07/27/2018    There was a circumferential mass at the level of the EG junction   Distal esophagus was noted to be normal  No evidence of Pelaez's esophagus in the esophagus   A tumor was found in the cardia   Biopsies were taken from this  Jignesh Conte duodenum was normal  There was wall thickening at the distal 1-2 cm of the esophagus and the GE junction   The GE junction measured about 1 2 cm in thickness   Tumor staging by U/S was T3 N0   Of note, T staging was suboptimal due to the tangential position of the mass in the cardia    Pathology of the mass in the cardia showed adenocarcinoma  Due to underlying lung disease and continuing to smoke cigarettes, patient likely is not a good surgical candidate  Initial plan was Taxol and carboplatin with concurrent radiation  Patient was evaluated by Dr Marissa Hammond, surgical oncologist and he did not rule out surgery  The plan changed to [facility] T chemotherapy  Patient received 1 cycle of 5 FU, leucovorin, oxaliplatin and Taxotere chemotherapy and also Neulasta  Patient has nausea, vomiting, diarrhea and dehydration  She is eating and drinking poorly and is feeling weak and tired  Physical examination and test results are as recorded and discussed  Patient was thinking of not continuing chemotherapy anymore  After some discussion the decision was she will go to infusion center today for hydration and antiemetics and her chemotherapy medication 5 FU infusion dose will be reduced and chemotherapy will be delayed by 1 week  From the office she was taken to the infusion center  We had a long and crystal discussion  1  Gastric carcinoma ([facility] [address] )      2  Diarrhea, unspecified type      3  Dehydration          Patient voiced understanding and agreement in the discussion  Addendum:  Call came from infusion center nurse that patient is not feeling any better with hydration and antiemetics  Arrangements were made for her admission  Counseling / Coordination of Care: 50 min   Greater than 50% of total time was spent with the patient and / or family counseling and / or coordination of care

## 2018-08-24 NOTE — ASSESSMENT & PLAN NOTE
Likely 2/2 chemo  White count 2 days ago was elevated, however received neulasta  Check labs  Will r/o cdiff  IVF

## 2018-08-24 NOTE — H&P
H&P- Luiz Xavier 1935, 80 y o  female MRN: [id number]    Unit/Bed#: [facility] 630-01 Encounter: [id number]    Primary Care Provider: Hardy Daly MD   Date and time admitted to hospital: 8/24/2018  4:35 PM        Nausea and vomiting   Assessment & Plan    Likely 2/2 chemo  Will check AXR  Zofran, IVF  Check BMP  Gastric carcinoma [facility])   Assessment & Plan    Received Chemo on 8/14  Management per Hem/Onc        * Diarrhea   Assessment & Plan    Likely 2/2 chemo  White count 2 days ago was elevated, however received neulasta  Check labs  Will r/o cdiff  IVF  VTE Prophylaxis: Enoxaparin (Lovenox)    Code Status: DNI/DNR- per patient       Anticipated Length of Stay:  Patient will be admitted on an Inpatient basis with an anticipated length of stay of  atleast 2 midnights  Justification for Hospital Stay: Diarrhea     Total Time for Visit, including Counseling / Coordination of Care: 30 minutes  Greater than 50% of this total time spent on direct patient counseling and coordination of care  Chief Complaint:   N/V/D    History of Present Illness:    Luiz Xavier is a 80 y o  female who presents as a direct admit for N/V/D  Patient was sent from the infusion center  Patient has a history of gastric carcinoma received chemotherapy 2 weeks ago  Patient's states she has been having ongoing nausea vomiting and diarrhea  States he vomits 2 to 3 times a day as well as has loose stools about 3 times a day  She denies any abdominal pain  Denies any fevers chills  Not able to tolerate much p o  States has not felt that week and has been able to ambulate okay  Today she was hydrated in the infusion center by her oncologist however was not improving so slim was called for direct admission  Currently patient states doing ok, denies pain, is hungry however says she hasn't been able to keep much down        Review of Systems:    Review of Systems    As per HPI otherwise negative       Past Medical and Surgical History:     Past Medical History:   Diagnosis Date    Arthritis     Asthma     COPD [facility]chronic obstructive pulmonary disease) [facility] [address] )     Ectopic pregnancy     GERD (gastroesophageal reflux disease)     Hiatal hernia     Hyperlipidemia     Hypertension     Iron deficiency anemia     [facility]        Past Surgical History:   Procedure Laterality Date    ANKLE GANGLION CYST EXCISION      ECTOPIC PREGNANCY SURGERY  1969    FL GUIDED CENTRAL VENOUS ACCESS REPLACEMENT  8/13/2018    HIP SURGERY      JOINT REPLACEMENT Bilateral     hip    JOINT REPLACEMENT Right     knee    ID EDG US EXAM SURGICAL ALTER STOM DUODENUM/JEJUNUM Left 7/27/2018    Procedure: RADIAL ENDOSCOPIC U/S and EGD;  Surgeon: Niranjan De Jesus MD;  Location: [facility]; Service: Gastroenterology    ID ESOPHAGOGASTRODUODENOSCOPY TRANSORAL DIAGNOSTIC N/A 6/20/2018    Procedure: ESOPHAGOGASTRODUODENOSCOPY (EGD); Surgeon: Marylou Montalvo MD;  Location: [facility]; Service: Gastroenterology    REPLACEMENT TOTAL KNEE Right     TOTAL HIP ARTHROPLASTY Left 2005    TOTAL HIP ARTHROPLASTY Right 2007    TUNNELED VENOUS PORT PLACEMENT N/A 8/13/2018    Procedure: INSERTION VENOUS PORT (PORT-A-CATH); Surgeon: Paramjit Cerda MD;  Location: [facility];  Service: Surgical Oncology       Meds/Allergies:    Prior to Admission medications    Medication Sig Start Date End Date Taking? Authorizing Provider   atorvastatin (LIPITOR) 10 mg tablet Take 10 mg by mouth daily    Historical Provider, MD   Calcium Citrate-Vitamin D (CALCIUM + D PO) Take by mouth    Historical Provider, MD   citalopram (CeleXA) 20 mg tablet Take 20 mg by mouth daily    Historical Provider, MD   dexamethasone (DECADRON) 4 mg tablet Take 1 tablet PO BID the day before chemotherapy, and the day of chemotherapy   Take 1 tablet PO the morning after chemotherapy 8/14/18   Shayla Titus MD   esomeprazole (NexIUM) 40 mg packet Take 40 mg by mouth every morning before breakfast    Historical Provider, MD   glucosamine-chondroitin 500-400 MG tablet Take 1 tablet by mouth 3 (three) times a day    Historical Provider, MD   irbesartan (AVAPRO) 75 mg tablet Take 75 mg by mouth daily    Historical Provider, MD   lidocaine-prilocaine (EMLA) cream Please apply a fingertip's worth over the port site 1 hour prior to chemo treatment, cover in plastic wrap 8/10/18   Clifford Rees MD   multivitamin (THERAGRAN) TABS Take 1 tablet by mouth daily    Historical Provider, MD   ondansetron (ZOFRAN) 4 mg tablet Take 1 tablet (4 mg total) by mouth every 8 (eight) hours as needed for nausea or vomiting 8/9/18   Jaimie Rainey PA-C   Psyllium (METAMUCIL FIBER PO) Take by mouth    Historical Provider, MD   Tiotropium Bromide Monohydrate ([id number]) [facility] Provider, MD     I have reviewed home medications with a medical source (PCP, Pharmacy, other)      Allergies: No Known Allergies    Social History:     History   Alcohol Use No     History   Smoking Status    Current Every Day Smoker    Packs/day: 0 50    Types: Cigarettes   Smokeless Tobacco    Never Used     Comment: trying to quit, currently smokes about 12 cigarettes a day      History   Drug Use No       Family History:    non-contributory    Physical Exam:     Vitals:        Physical Exam    GEN: NAD  HEENT: PERRL  CARDIO: s1 s2 regular rate   LUNGS: CTA  ABD: Soft, NT  EXT: No edema       Additional Data:     Lab Results: pending       Results from last 7 days  Lab Units 08/22/18  1139   WBC Thousand/uL 17 74*   HEMOGLOBIN g/dL 11 8   HEMATOCRIT % 35 8   PLATELETS Thousands/uL 231   LYMPHO PCT % 14   MONO PCT MAN % 11   EOSINO PCT MANUAL % 0       Results from last 7 days  Lab Units 08/22/18  1139   SODIUM mmol/L 132*   POTASSIUM mmol/L 3 4*   CHLORIDE mmol/L 96*   CO2 mmol/L 29   BUN mg/dL 14   CREATININE mg/dL 0 88   CALCIUM mg/dL 9 4   TOTAL PROTEIN g/dL 6 7   BILIRUBIN TOTAL mg/dL 0 31   ALK PHOS U/L 107   ALT U/L 16   AST U/L 13           Imaging: pending     Xr Chest Portable    Result Date: 8/13/2018  Narrative: CHEST INDICATION:   s/p port insertion  COMPARISON:  Two-view chest 2/19/2014  EXAM PERFORMED/VIEWS:  XR CHEST PORTABLE FINDINGS:  Left-sided chest port with catheter tip projecting over the superior cavoatrial junction  Stable cardiac and mediastinal contours  Very large hiatal hernia  The lungs are clear  No pneumothorax or pleural effusion  Osseous structures appear within normal limits for patient age  Impression: Left-sided chest port with catheter tip projecting over the superior cavoatrial junction  Very large hiatal hernia seen on the prior study  Workstation performed: [id number]     Fl Guided Central Venous Access Device Insertion    Result Date: 8/15/2018  Narrative: C-ARM - chest INDICATION: C15 5: Malignant neoplasm of lower third of esophagus  Procedure guidance  COMPARISON:   view CT chest, abdomen and pelvis 7/2/2018 TECHNIQUE: FLUOROSCOPY TIME:   15 sec ft 2 FLUOROSCOPIC IMAGES FINDINGS: Fluoroscopic guidance provided for portacath placement  Tip of Port-A-Cath terminates in the region of the atrial caval junction  Osseous and soft tissue detail limited by technique  Impression: Fluoroscopic guidance provided for portacath placement as above  Please refer to the separate procedure notes for additional details  Workstation performed: [id number]           Allscripts / Epic Records Reviewed: Yes     ** Please Note: This note has been constructed using a voice recognition system   **

## 2018-08-25 PROBLEM — D72.829 LEUKOCYTOSIS: Status: ACTIVE | Noted: 2018-08-25

## 2018-08-25 PROBLEM — C15.9 ESOPHAGEAL CARCINOMA (HCC): Status: ACTIVE | Noted: 2018-08-24

## 2018-08-25 LAB
ANION GAP SERPL CALCULATED.3IONS-SCNC: 9 MMOL/L (ref 4–13)
BASOPHILS # BLD MANUAL: 0 THOUSAND/UL (ref 0–0.1)
BASOPHILS NFR MAR MANUAL: 0 % (ref 0–1)
BUN SERPL-MCNC: 12 MG/DL (ref 5–25)
BURR CELLS BLD QL SMEAR: PRESENT
CALCIUM SERPL-MCNC: 8.3 MG/DL (ref 8.3–10.1)
CHLORIDE SERPL-SCNC: 99 MMOL/L (ref 100–108)
CO2 SERPL-SCNC: 28 MMOL/L (ref 21–32)
CREAT SERPL-MCNC: 0.64 MG/DL (ref 0.6–1.3)
EOSINOPHIL # BLD MANUAL: 0 THOUSAND/UL (ref 0–0.4)
EOSINOPHIL NFR BLD MANUAL: 0 % (ref 0–6)
ERYTHROCYTE [DISTWIDTH] IN BLOOD BY AUTOMATED COUNT: 13.2 % (ref 11.6–15.1)
GFR SERPL CREATININE-BSD FRML MDRD: 83 ML/MIN/1.73SQ M
GLUCOSE SERPL-MCNC: 106 MG/DL (ref 65–140)
HCT VFR BLD AUTO: 32.2 % (ref 34.8–46.1)
HGB BLD-MCNC: 10.4 G/DL (ref 11.5–15.4)
LG PLATELETS BLD QL SMEAR: PRESENT
LYMPHOCYTES # BLD AUTO: 1.21 THOUSAND/UL (ref 0.6–4.47)
LYMPHOCYTES # BLD AUTO: 4 % (ref 14–44)
MCH RBC QN AUTO: 29.5 PG (ref 26.8–34.3)
MCHC RBC AUTO-ENTMCNC: 32.3 G/DL (ref 31.4–37.4)
MCV RBC AUTO: 91 FL (ref 82–98)
MONOCYTES # BLD AUTO: 0.61 THOUSAND/UL (ref 0–1.22)
MONOCYTES NFR BLD: 2 % (ref 4–12)
MYELOCYTES NFR BLD MANUAL: 1 % (ref 0–1)
NEUTROPHILS # BLD MANUAL: 28.14 THOUSAND/UL (ref 1.85–7.62)
NEUTS BAND NFR BLD MANUAL: 4 % (ref 0–8)
NEUTS SEG NFR BLD AUTO: 89 % (ref 43–75)
NRBC BLD AUTO-RTO: 0 /100 WBCS
PLATELET # BLD AUTO: 266 THOUSANDS/UL (ref 149–390)
PLATELET BLD QL SMEAR: ADEQUATE
PMV BLD AUTO: 11.3 FL (ref 8.9–12.7)
POIKILOCYTOSIS BLD QL SMEAR: PRESENT
POLYCHROMASIA BLD QL SMEAR: PRESENT
POTASSIUM SERPL-SCNC: 3.3 MMOL/L (ref 3.5–5.3)
RBC # BLD AUTO: 3.53 MILLION/UL (ref 3.81–5.12)
RBC MORPH BLD: PRESENT
SODIUM SERPL-SCNC: 136 MMOL/L (ref 136–145)
WBC # BLD AUTO: 30.26 THOUSAND/UL (ref 4.31–10.16)

## 2018-08-25 PROCEDURE — 85027 COMPLETE CBC AUTOMATED: CPT | Performed by: INTERNAL MEDICINE

## 2018-08-25 PROCEDURE — 99232 SBSQ HOSP IP/OBS MODERATE 35: CPT | Performed by: PHYSICIAN ASSISTANT

## 2018-08-25 PROCEDURE — 85007 BL SMEAR W/DIFF WBC COUNT: CPT | Performed by: INTERNAL MEDICINE

## 2018-08-25 PROCEDURE — 99222 1ST HOSP IP/OBS MODERATE 55: CPT | Performed by: FAMILY MEDICINE

## 2018-08-25 PROCEDURE — C9113 INJ PANTOPRAZOLE SODIUM, VIA: HCPCS | Performed by: INTERNAL MEDICINE

## 2018-08-25 PROCEDURE — 80048 BASIC METABOLIC PNL TOTAL CA: CPT | Performed by: INTERNAL MEDICINE

## 2018-08-25 PROCEDURE — 99252 IP/OBS CONSLTJ NEW/EST SF 35: CPT | Performed by: INTERNAL MEDICINE

## 2018-08-25 RX ORDER — DIPHENHYDRAMINE HYDROCHLORIDE 50 MG/ML
12.5 INJECTION INTRAMUSCULAR; INTRAVENOUS ONCE
Status: COMPLETED | OUTPATIENT
Start: 2018-08-25 | End: 2018-08-25

## 2018-08-25 RX ORDER — DEXAMETHASONE SODIUM PHOSPHATE 4 MG/ML
2 INJECTION, SOLUTION INTRA-ARTICULAR; INTRALESIONAL; INTRAMUSCULAR; INTRAVENOUS; SOFT TISSUE 2 TIMES DAILY
Status: DISCONTINUED | OUTPATIENT
Start: 2018-08-25 | End: 2018-08-26

## 2018-08-25 RX ORDER — OLANZAPINE 5 MG/1
2.5 TABLET, ORALLY DISINTEGRATING ORAL DAILY
Status: DISCONTINUED | OUTPATIENT
Start: 2018-08-25 | End: 2018-08-26

## 2018-08-25 RX ORDER — METOCLOPRAMIDE HYDROCHLORIDE 5 MG/ML
10 INJECTION INTRAMUSCULAR; INTRAVENOUS EVERY 6 HOURS PRN
Status: DISCONTINUED | OUTPATIENT
Start: 2018-08-25 | End: 2018-08-25

## 2018-08-25 RX ORDER — OLANZAPINE 5 MG/1
5 TABLET, ORALLY DISINTEGRATING ORAL
Status: DISCONTINUED | OUTPATIENT
Start: 2018-08-25 | End: 2018-08-26

## 2018-08-25 RX ORDER — POTASSIUM CHLORIDE 20 MEQ/1
40 TABLET, EXTENDED RELEASE ORAL ONCE
Status: COMPLETED | OUTPATIENT
Start: 2018-08-25 | End: 2018-08-25

## 2018-08-25 RX ORDER — MAGNESIUM HYDROXIDE/ALUMINUM HYDROXICE/SIMETHICONE 120; 1200; 1200 MG/30ML; MG/30ML; MG/30ML
20 SUSPENSION ORAL EVERY 4 HOURS PRN
Status: DISCONTINUED | OUTPATIENT
Start: 2018-08-25 | End: 2018-08-28 | Stop reason: HOSPADM

## 2018-08-25 RX ADMIN — OLANZAPINE 5 MG: 5 TABLET, ORALLY DISINTEGRATING ORAL at 21:04

## 2018-08-25 RX ADMIN — POTASSIUM CHLORIDE 40 MEQ: 1500 TABLET, EXTENDED RELEASE ORAL at 13:27

## 2018-08-25 RX ADMIN — METOPROLOL TARTRATE 2.5 MG: 5 INJECTION, SOLUTION INTRAVENOUS at 00:15

## 2018-08-25 RX ADMIN — ENOXAPARIN SODIUM 40 MG: 40 INJECTION SUBCUTANEOUS at 08:33

## 2018-08-25 RX ADMIN — ATORVASTATIN CALCIUM 10 MG: 10 TABLET, FILM COATED ORAL at 08:34

## 2018-08-25 RX ADMIN — ONDANSETRON 4 MG: 2 INJECTION INTRAMUSCULAR; INTRAVENOUS at 17:22

## 2018-08-25 RX ADMIN — DEXAMETHASONE SODIUM PHOSPHATE 2 MG: 4 INJECTION, SOLUTION INTRAMUSCULAR; INTRAVENOUS at 17:22

## 2018-08-25 RX ADMIN — SODIUM CHLORIDE 100 ML/HR: 0.9 INJECTION, SOLUTION INTRAVENOUS at 09:46

## 2018-08-25 RX ADMIN — METOCLOPRAMIDE 10 MG: 5 INJECTION, SOLUTION INTRAMUSCULAR; INTRAVENOUS at 09:44

## 2018-08-25 RX ADMIN — PANTOPRAZOLE SODIUM 40 MG: 40 INJECTION, POWDER, FOR SOLUTION INTRAVENOUS at 17:22

## 2018-08-25 RX ADMIN — ONDANSETRON 4 MG: 2 INJECTION INTRAMUSCULAR; INTRAVENOUS at 05:00

## 2018-08-25 RX ADMIN — SODIUM CHLORIDE 100 ML/HR: 0.9 INJECTION, SOLUTION INTRAVENOUS at 17:20

## 2018-08-25 RX ADMIN — DEXAMETHASONE SODIUM PHOSPHATE 2 MG: 4 INJECTION, SOLUTION INTRAMUSCULAR; INTRAVENOUS at 13:28

## 2018-08-25 RX ADMIN — DIPHENHYDRAMINE HYDROCHLORIDE 12.5 MG: 50 INJECTION, SOLUTION INTRAMUSCULAR; INTRAVENOUS at 00:32

## 2018-08-25 RX ADMIN — OLANZAPINE 2.5 MG: 5 TABLET, ORALLY DISINTEGRATING ORAL at 13:28

## 2018-08-25 RX ADMIN — TIOTROPIUM BROMIDE 18 MCG: 18 CAPSULE ORAL; RESPIRATORY (INHALATION) at 08:34

## 2018-08-25 NOTE — ASSESSMENT & PLAN NOTE
· Likely 2/2 chemo  · C diff was ordered but patient hasn't had a BM   If no further diarrhea, can d/c

## 2018-08-25 NOTE — CONSULTS
Plan:Consultation - Sindy Logan 80 y o  female MRN: [id number]    Unit/Bed#: [facility] 630-01 Encounter: [id number]      Assessment/Plan     Assessment:  In summary, this is an 22-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  Chemotherapy was instituted in the neoadjuvant setting  Unfortunately she has had significant toxicity, nausea, vomiting, diarrhea  We reviewed that a change in treatment plan is appropriate  I would not pursue that chemotherapy program any further in her case  Other chemotherapy programs could be considered  Alternatively, other treatment approaches such as G-tube, radiation therapy, resection, etc could be considered for palliative purposes not necessarily intending cure or long-term survival   Recent conversations have revolved around the possibility of palliative care without active cancer treatment  The patient states that she understands that this may disappoint family members, friends, or caregivers but that she feels fairly strongly that she does not wish to pursue further treatment for her cancer  Unfortunately, she has significant morbidity from her cancer but palliative care approach is reasonable to deal with this very problem  I reviewed the above with the patient  She asked appropriate questions  She seems clear in her reasoning, etc and I believe she is making this decision in a competent state  Plan:  See above  History of Present Illness     HPI: Sindy Logan is a 80y o  year old female who presents with Nausea vomiting and diarrhea  On admission, WBC is 28 4, hemoglobin 11 8, platelet count 688  Differential left shift  BMP showed sodium 128, potassium 3 3, creatinine 0 69, glucose 144  June 2018 patient was diagnosed with distal esophageal adenocarcinoma  She presented with 25 lb weight loss   EGD showed distal esophageal tumor biopsy confirmed intramucosal carcinoma with high-grade dysplasia CT chest abdomen pelvis showed subtle narrowing at the distal esophagus/GE junction  No evidence of metastatic disease  PET-CT showed the same  EGD with EUS showed T3 N0 tumor  Biopsy confirmed adenocarcinoma  Initial plan was for radiation and chemotherapy but upon surgical review resection is considered  Treatment was altered to 5 FU/leucovorin/5 FU continuous infusion, Taxotere, day 1   Neulasta support  This program was initiated on August 14, 2018  Inpatient consult to Oncology  Consult performed by: Leann Glaser ordered by: Rafael Taveras          Review of Systems   Constitutional: Negative for appetite change, diaphoresis, fatigue and fever  HENT: Negative for sinus pain  Eyes: Negative for discharge  Respiratory: Negative for cough and shortness of breath  Cardiovascular: Negative for chest pain  Gastrointestinal: Negative for abdominal pain, constipation and diarrhea  Endocrine: Negative for cold intolerance  Genitourinary: Negative for difficulty urinating and hematuria  Musculoskeletal: Negative for joint swelling  Skin: Negative for rash  Allergic/Immunologic: Negative for environmental allergies  Neurological: Negative for dizziness and headaches  Hematological: Negative for adenopathy  Psychiatric/Behavioral: Negative for agitation         Historical Information   Past Medical History:   Diagnosis Date    Arthritis     Asthma     COPD [facility]chronic obstructive [facility] disease) [facility] [address] [facility]     Ectopic pregnancy     GERD (gastroesophageal reflux disease)     Hiatal hernia     Hyperlipidemia     Hypertension     Iron deficiency anemia     Ulcerative colitis ([facility] [address] )      Past Surgical History:   Procedure Laterality Date    ANKLE GANGLION CYST EXCISION      ECTOPIC PREGNANCY SURGERY  1969    FL GUIDED CENTRAL VENOUS ACCESS REPLACEMENT  8/13/2018    HIP SURGERY      JOINT REPLACEMENT Bilateral     hip    JOINT REPLACEMENT Right     knee    AK EDG US EXAM SURGICAL ALTER STOM DUODENUM/JEJUNUM Left 7/27/2018    Procedure: RADIAL ENDOSCOPIC U/S and EGD;  Surgeon: Naeem Oakley MD;  Location: [facility]; Service: Gastroenterology    ME ESOPHAGOGASTRODUODENOSCOPY TRANSORAL DIAGNOSTIC N/A 6/20/2018    Procedure: ESOPHAGOGASTRODUODENOSCOPY (EGD); Surgeon: Effie Bella MD;  Location: [facility]; Service: Gastroenterology    REPLACEMENT TOTAL KNEE Right     TOTAL HIP ARTHROPLASTY Left 2005    TOTAL HIP ARTHROPLASTY Right 2007    TUNNELED VENOUS PORT PLACEMENT N/A 8/13/2018    Procedure: INSERTION VENOUS PORT (PORT-A-CATH); Surgeon: Sony Waller MD;  [facility];  Service: Surgical Oncology     Social History   History   Alcohol Use No     History   Drug Use No     History   Smoking Status    Current Every Day Smoker    Packs/day: 0 50    Types: Cigarettes   Smokeless Tobacco    Never Used     Comment: trying to quit, currently smokes about 12 cigarettes a day      Family History:   Family History   Problem Relation Age of Onset    Heart attack Mother     Hypertension Mother     Heart disease Father     Kidney cancer Sister     Prostate cancer Brother        Meds/Allergies   all current active meds have been reviewed  No Known Allergies    Objective   Vitals: Blood pressure 120/70, pulse 89, temperature 97 7 °F (36 5 °C), temperature source Oral, resp  rate 20, SpO2 98 %, not currently breastfeeding  Intake/Output Summary (Last 24 hours) at 08/25/18 1213  Last data filed at 08/25/18 0944   Gross per 24 hour   Intake          1718 33 ml   Output              700 ml   Net          1018 33 ml     Invasive Devices     Peripheral Intravenous Line            Peripheral IV 08/24/18 Left Forearm less than 1 day                Physical Exam   Constitutional: She is oriented to person, place, and time  She appears well-developed  HENT:   Head: Normocephalic  Eyes: Pupils are equal, round, and reactive to light  Neck: Neck supple  Cardiovascular: Normal rate  No murmur heard  Pulmonary/Chest: No respiratory distress  She has no wheezes  She has no rales  Abdominal: Soft  She exhibits no distension  There is no tenderness  There is no rebound  Musculoskeletal: She exhibits no edema  Lymphadenopathy:     She has no cervical adenopathy  Neurological: She is alert and oriented to person, place, and time  She displays normal reflexes  Skin: Skin is warm  No rash noted  Psychiatric: She has a normal mood and affect  Thought content normal        Lab Results: I have personally reviewed pertinent reports  Imaging Studies: I have personally reviewed pertinent reports  EKG, Pathology, and Other Studies: I have personally reviewed pertinent reports  Code Status: Level 3 - DNAR and DNI  Advance Directive and Living Will:      Power of :    POLST:      Counseling / Coordination of Care  Total floor / unit time spent today 40 minutes  Greater than 50% of total time was spent with the patient and / or family counseling and / or coordination of care  A description of the counseling / coordination of care:  See note

## 2018-08-25 NOTE — ASSESSMENT & PLAN NOTE
· Likely 2/2 chemo  · Zofran not helping  Add Reglan  · Continue IVFs   She has not been able to keep anything down  · Consult palliative care

## 2018-08-25 NOTE — CASE MANAGEMENT
Initial Clinical Review    Thank you,  [address]  Utilization Review Department  Phone: [phone]; Fax [phone]  ATTENTION: Please call with any questions or concerns to [phone]  and carefully follow the prompts so that you are directed to the right person  Send all requests for admission clinical reviews, approved or denied determinations and any other requests to fax [phone]  All voicemails are confidential        Admission: Date/Time/Statement: 8/24/18 @ 1635     Orders Placed This Encounter   Procedures    Inpatient Admission     Standing Status:   Standing     Number of Occurrences:   1     Order Specific Question:   Admitting Physician     Answer:   Марина Davila [[id number]]     Order Specific Question:   Level of Care     Answer:   Med Surg [16]     Order Specific Question:   Estimated length of stay     Answer:   More than 2 Midnights     Order Specific Question:   Certification     Answer:   I certify that inpatient services are medically necessary for this patient for a duration of greater than two midnights  See H&P and MD Progress Notes for additional information about the patient's course of treatment  ED: Date/Time/Mode of Arrival:  Admitted from [address] [place] [address] 8/24    Chief Complaint: N/V/D  History of Illness: 80 y o  female who presents as a direct admit for N/V/D  Patient was sent from the infusion center  Patient has a history of gastric carcinoma received chemotherapy 2 weeks ago  Patient's states she has been having ongoing nausea vomiting and diarrhea  States he vomits 2 to 3 times a day as well as has loose stools about 3 times a day  Not able to tolerate much p o  States has not felt that week and has been able to ambulate okay  Today she was hydrated in the infusion center by her oncologist however was not improving so was referred for direct admission      Vital Signs:   08/24 0701  08/25 0700    08/25 0701  08/25 1654 Most Recent    Temperature (°F) 97 8898 8 97 7 97 7 (36 5)    Pulse 7698 89 89    Respirations 1820 20 20    Blood Pressure 90/59177/102 120/70 120/70    SpO2 (%) 9396 98 98         Abnormal Labs/Diagnostic Test Results:   Ref Range & Units 08/24/18 1906   Sodium 136 - 145 mmol/L 128     Potassium 3 5 - 5 3 mmol/L 3 3     Chloride 100 - 108 mmol/L 97     CO2 21 - 32 mmol/L 22    Anion Gap 4 - 13 mmol/L 9    BUN 5 - 25 mg/dL 14    Creatinine 0 60 - 1 30 mg/dL 0 69    Glucose 65 - 140 mg/dL 144       Ref Range & Units 08/24/18 1906   WBC 4 31 - 10 16 Thousand/uL 28 46           Past Medical/Surgical History: Active Ambulatory Problems     Diagnosis Date Noted    Cancer of abdominal esophagus ([place] [address] ) 07/05/2018    [facility] 08/08/2018    GERD (gastroesophageal reflux disease) 08/08/2018    Ulcerative colitis ([place] [address] ) 08/08/2018    Asthma 08/08/2018    Arthritis 08/08/2018    Hyperlipidemia 08/08/2018    Iron deficiency anemia 08/08/2018    Esophageal carcinoma ([place] [address] ) 08/24/2018    Diarrhea 08/24/2018    Dehydration 08/24/2018    Nausea and vomiting 08/24/2018     Past Medical History:   Diagnosis Date    Arthritis     Asthma     COPD (chronic obstructive pulmonary disease) ([place] [address] )     Ectopic pregnancy     GERD (gastroesophageal reflux disease)     Hiatal hernia     Hyperlipidemia     Hypertension     Iron deficiency anemia     Ulcerative colitis ([place] [address] )        Admitting Diagnosis: Diarrhea [R19 7]  Dehydration [E86 0]  Diarrhea [R19 7]    Age/Sex: 80 y o  female    Assessment/Plan:   Nausea and vomiting   Assessment & Plan     Likely 2/2 chemo  Will check AXR  Zofran, IVF  Check BMP          Gastric carcinoma [facility])   Assessment & Plan     Received Chemo on 8/14  Management per Hem/Onc          * Diarrhea   Assessment & Plan     Likely 2/2 chemo  White count 2 days ago was elevated, however received neulasta  Check labs  Will r/o cdiff  IVF               VTE Prophylaxis: Enoxaparin (Lovenox)    Code Status: DNI/DNR- per patient         Anticipated Length of Stay:  Patient will be admitted on an Inpatient basis with an anticipated length of stay of  atleast 2 midnights  Justification for Hospital Stay: Diarrhea         Admission Orders:  M/S unit  Clear liquid diet  Monitor I&O's, daily weights  Stool for C-diff pending  Up with assistance    Consults:   Oncology, Palliative care    Scheduled Meds:   Current Facility-Administered Medications:  acetaminophen 650 mg Oral Q6H PRN Daisy Sage MD    atorvastatin 10 mg Oral Daily Daisy Sage MD    citalopram 20 mg Oral Daily Daisy Sage MD    dexamethasone 2 mg Intravenous BID Jose De Jesus Chowdhury MD    enoxaparin 40 mg Subcutaneous Daily Daisy Sage MD    nicotine 1 patch Transdermal Daily Daisy Sage MD    OLANZapine 2 5 mg Oral Daily Fred Rubio MD    OLANZapine 5 mg Oral HS Fred Rubio MD    ondansetron 4 mg Intravenous Q6H PRN Daisy Sage MD    pantoprazole 40 mg Intravenous Q24H [facility] Daisy Sage MD    sodium chloride 100 mL/hr Intravenous Continuous Daisy Sage MD Last Rate: 100 mL/hr (08/25/18 9799)   tiotropium 18 mcg Inhalation Daily Daisy Sage MD      Facility-Administered Medications Ordered in Other Encounters:  [START ON 8/27/2018] alteplase 2 mg Intracatheter PRN Violet Green MD   alteplase 2 mg Intracatheter PRN Violet Green MD   aluminum-magnesium hydroxide-simethicone 30 mL Oral Once Violet Green MD   [START ON 8/27/2018] heparin lock flush 300 Units Intracatheter Q1H PRN Violet Green MD   heparin lock flush 300 Units Intracatheter Q1H PRN Violet Green MD   pantoprazole 20 mg Oral Once Violet Green MD     Continuous Infusions:   sodium chloride 100 mL/hr Last Rate: 100 mL/hr (08/25/18 8619)     PRN Meds:   Acetaminophen  Metoclopramide 10 mg IV x1    ondansetron 4 mg IV x2      Oncology consult:   Assessment:  In summary, this is an 75-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  Chemotherapy was instituted in the neoadjuvant setting  Unfortunately she has had significant toxicity, nausea, vomiting, diarrhea  We reviewed that a change in treatment plan is appropriate  I would not pursue that chemotherapy program any further in her case  Other chemotherapy programs could be considered  Alternatively, other treatment approaches such as G-tube, radiation therapy, resection, etc could be considered for palliative purposes not necessarily intending cure or long-term survival   Recent conversations have revolved around the possibility of palliative care without active cancer treatment  The patient states that she understands that this may disappoint family members, friends, or caregivers but that she feels fairly strongly that she does not wish to pursue further treatment for her cancer  Unfortunately, she has significant morbidity from her cancer but palliative care approach is reasonable to deal with this very problem  Palliative care consult:  admitted from [facility] with intractable nausea and vomiting  Feels terrible and does not feel that she can continue with chemotherapy  We talked about her goals and values  She is worried that deciding to stop treatment would disappoint her oncologist and friends but she feels that she wants to be at home and be comfortable  She denies any pain at present  It seems very likely that she is struggling because of her acute symptom distress and that she may make a different decision if symptoms able to be managed  I offered to have the hospice liaison come and talk without commitment and that we will focus on her symptoms initially and then reassess her goals      Plan:  Consult hospice liaison at patient request  Dexamethasone 2mg bid iv  Olanzapine ODT 2 5 mg in am and 5 mg at bedtime

## 2018-08-25 NOTE — CONSULTS
Consultation - [facility] JESSICA Rodriguez 80 y o  female MRN: [id number]  Unit/Bed#: [facility] 630-01 Encounter: [id number]      Assessment/Plan     Assessment:  Patient Active Problem List   Diagnosis    Cancer of abdominal esophagus ([facility] )    COPD (chronic obstructive pulmonary disease) (HCC)    GERD (gastroesophageal reflux disease)    [facility]ative [facility] ([facility] )    Asthma    Arthritis    Hyperlipidemia    Iron deficiency anemia    Esophageal carcinoma (HCC)    Diarrhea    Dehydration    Nausea and vomiting    Leukocytosis   admitted from infusion center with intractable nausea and vomiting  Feels terrible and does not feel that she can continue with chemotherapy  We talked about her goals and values  She is worried that deciding to stop treatment would disappoint her oncologist and friends but she feels that she wants to be at home and be comfortable  She denies any pain at present  It seems very likely that she is struggling because of her acute symptom distress and that she may make a different decision if symptoms able to be managed  I offered to have the hospice liaison come and talk without commitment and that we will focus on her symptoms initially and then reassess her goals  Plan:  Consult hospice liaison at patient request  Dexamethasone 2mg bid iv  Olanzapine ODT 2 5 mg in am and 5 mg at bedtime  History of Present Illness   Physician Requesting Consult: Daisy Sage MD  Reason for Consult / Principal Problem: intractable nausea  Hx and PE limited by: alert and oriented in all spheres, fully competent  HPI: Yessy Long is a 80y o  year old female who presents with diagnosed in June of 2018 with 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  No evidence of distant mets on PET  Had chemotherapy and has been having uncontrolled nausea and unable to eat  No relief from ondansetron  Minimal relief from Reglan   She is struggling with the idea of continuing treatment and really wants to feel that she can make her own decisions  She is not afraid of dying but is scared about suffering and how things would progress  She has had positive experiences with hospice in the past and she is interested in considering this option or at least getting more information  Her primary distress is the nausea and being unable to eat or drink  Inpatient consult to Palliative Care  Consult performed by: LUCILA Dukes  Consult ordered by: Joao Vuong          Review of Systems   Constitutional: Positive for activity change, appetite change and fatigue  Negative for chills, diaphoresis, fever and unexpected weight change  HENT: Negative  Eyes: Negative  Respiratory: Negative  Cardiovascular: Negative  Gastrointestinal: Positive for nausea and vomiting  Endocrine: Negative  Genitourinary: Negative  Musculoskeletal: Negative  Skin: Negative  Allergic/Immunologic: Negative  Neurological: Negative  Hematological: Negative  Psychiatric/Behavioral: Negative  Historical Information   Past Medical History:   Diagnosis Date    Arthritis     Asthma     COPD [facility]chronic obstructive [facility] disease) [facility] )     Ectopic pregnancy     GERD (gastroesophageal reflux disease)     Hiatal hernia     Hyperlipidemia     Hypertension     Iron deficiency anemia     Ulcerative colitis ([facility] )      Past Surgical History:   Procedure Laterality Date    ANKLE GANGLION CYST EXCISION      ECTOPIC PREGNANCY SURGERY  1969    FL GUIDED CENTRAL VENOUS ACCESS REPLACEMENT  8/13/2018    HIP SURGERY      JOINT REPLACEMENT Bilateral     hip    JOINT REPLACEMENT Right     knee    WV EDG US EXAM SURGICAL ALTER STOM DUODENUM/JEJUNUM Left 7/27/2018    Procedure: RADIAL ENDOSCOPIC U/S and EGD;  Surgeon: Albin Carter MD;  Location: [facility];   Service: Gastroenterology    WV ESOPHAGOGASTRODUODENOSCOPY TRANSORAL DIAGNOSTIC N/A 6/20/2018    Procedure: ESOPHAGOGASTRODUODENOSCOPY (EGD); Surgeon: Cole Burrell MD;  Location: [facility]; Service: Gastroenterology    REPLACEMENT TOTAL KNEE Right     TOTAL HIP ARTHROPLASTY Left 2005    TOTAL HIP ARTHROPLASTY Right 2007    TUNNELED VENOUS PORT PLACEMENT N/A 8/13/2018    Procedure: INSERTION VENOUS PORT (PORT-A-CATH); Surgeon: Silvana Brown MD;  Location[facility];  Service: Surgical Oncology     Social History     Social History    Marital status:       Spouse name: N/A    Number of children: N/A    Years of education: N/A     Social History Main Topics    Smoking status: Current Every Day Smoker     Packs/day: 0 50     Types: Cigarettes    Smokeless tobacco: Never Used      Comment: trying to quit, currently smokes about 12 cigarettes a day     Alcohol use No    Drug use: No    Sexual activity: Not on file     Other Topics Concern    Not on file     Social History Narrative    No narrative on file     Family History   Problem Relation Age of Onset    Heart attack Mother     Hypertension Mother     Heart disease Father     Kidney cancer Sister     Prostate cancer Brother        Meds/Allergies   all current active meds have been reviewed, current meds:   Current Facility-Administered Medications   Medication Dose Route Frequency    acetaminophen (TYLENOL) tablet 650 mg  650 mg Oral Q6H PRN    atorvastatin (LIPITOR) tablet 10 mg  10 mg Oral Daily    citalopram (CeleXA) tablet 20 mg  20 mg Oral Daily    dexamethasone (DECADRON) injection 2 mg  2 mg Intravenous BID    enoxaparin (LOVENOX) subcutaneous injection 40 mg  40 mg Subcutaneous Daily    nicotine (NICODERM CQ) 14 mg/24hr TD 24 hr patch 1 patch  1 patch Transdermal Daily    OLANZapine (ZyPREXA ZYDIS) dispersible tablet 2 5 mg  2 5 mg Oral Daily    OLANZapine (ZyPREXA ZYDIS) dispersible tablet 5 mg  5 mg Oral HS    ondansetron (ZOFRAN) injection 4 mg  4 mg Intravenous Q6H PRN    pantoprazole (PROTONIX) injection 40 mg  40 mg Intravenous Q24H [address]    sodium chloride 0 9 % infusion  100 mL/hr Intravenous Continuous    tiotropium (SPIRIVA) capsule for inhaler 18 mcg  18 mcg Inhalation Daily     Facility-Administered Medications Ordered in Other Encounters   Medication Dose Route Frequency    [START ON 8/27/2018] alteplase (CATHFLO) injection 2 mg  2 mg Intracatheter PRN    alteplase (CATHFLO) injection 2 mg  2 mg Intracatheter PRN    aluminum-magnesium hydroxide-simethicone (MYLANTA) 200-200-20 mg/5 mL oral suspension 30 mL  30 mL Oral Once    [START ON 8/27/2018] heparin lock flush 100 units/mL injection 300 Units  300 Units Intracatheter Q1H PRN    heparin lock flush 100 units/mL injection 300 Units  300 Units Intracatheter Q1H PRN    pantoprazole (PROTONIX) EC tablet 20 mg  20 mg Oral Once    and PTA meds:   Prior to Admission Medications   Prescriptions Last Dose Informant Patient Reported? Taking? Calcium Citrate-Vitamin D (CALCIUM + D PO)  Self Yes No   Sig: Take by mouth   Psyllium (METAMUCIL FIBER PO)  Self Yes No   Sig: Take by mouth   Tiotropium Bromide Monohydrate (SPIRIVA HANDIHALER IN)  Self Yes No   Sig: Inhale   atorvastatin (LIPITOR) 10 mg tablet  Self Yes No   Sig: Take 10 mg by mouth daily   dexamethasone (DECADRON) 4 mg tablet   No No   Sig: Take 1 tablet PO BID the day before chemotherapy, and the day of chemotherapy   Take 1 tablet PO the morning after chemotherapy   esomeprazole (NexIUM) 40 mg packet  Self Yes No   Sig: Take 40 mg by mouth every morning before breakfast   glucosamine-chondroitin 500-400 MG tablet  Self Yes No   Sig: Take 1 tablet by mouth 3 (three) times a day   irbesartan (AVAPRO) 75 mg tablet  Self Yes No   Sig: Take 75 mg by mouth daily   lidocaine-prilocaine (EMLA) cream   No No   Sig: Please apply a fingertip's worth over the port site 1 hour prior to chemo treatment, cover in plastic wrap   multivitamin (THERAGRAN) TABS  Self Yes No   Sig: Take 1 tablet by mouth daily   ondansetron (ZOFRAN) 4 mg tablet   No No   Sig: Take 1 tablet (4 mg total) by mouth every 8 (eight) hours as needed for nausea or vomiting      Facility-Administered Medications: None       Palliative Care Medications:     No Known Allergies    Objective     Physical Exam   Constitutional: She is oriented to person, place, and time  Vital signs are normal  She appears well-developed and well-nourished  She is active and cooperative  She appears ill  She appears distressed  HENT:   Head: Normocephalic and atraumatic  Right Ear: External ear normal    Left Ear: External ear normal    Nose: Nose normal    Mouth/Throat: Oropharynx is clear and moist  No oropharyngeal exudate  Eyes: Conjunctivae and EOM are normal  Pupils are equal, round, and reactive to light  Right eye exhibits no discharge  Left eye exhibits no discharge  No scleral icterus  Neck: Normal range of motion  Neck supple  No JVD present  No tracheal deviation present  No thyromegaly present  Cardiovascular: Normal rate, regular rhythm, normal heart sounds and intact distal pulses  Exam reveals no gallop and no friction rub  No murmur heard  Pulmonary/Chest: Effort normal and breath sounds normal  No stridor  No respiratory distress  She has no wheezes  She has no rales  She exhibits no tenderness  Abdominal: Soft  Bowel sounds are normal  She exhibits no distension and no mass  There is tenderness  There is guarding  There is no rebound  Musculoskeletal: Normal range of motion  She exhibits no edema, tenderness or deformity  Lymphadenopathy:     She has no cervical adenopathy  Neurological: She is alert and oriented to person, place, and time  She has normal reflexes  She displays normal reflexes  No cranial nerve deficit  She exhibits normal muscle tone  Coordination normal    Skin: Skin is warm and dry  No rash noted  She is not diaphoretic  No erythema  No pallor     Psychiatric: Her speech is normal and behavior is normal  Thought content normal  Her mood appears anxious  Cognition and memory are normal  She expresses impulsivity  She does not express inappropriate judgment  Vitals reviewed  Lab Results:   I have personally reviewed pertinent labs  , CBC:   Lab Results   Component Value Date    WBC 30 26 (HH) 08/25/2018    HGB 10 4 (L) 08/25/2018    HCT 32 2 (L) 08/25/2018    MCV 91 08/25/2018     08/25/2018    MCH 29 5 08/25/2018    MCHC 32 3 08/25/2018    RDW 13 2 08/25/2018    MPV 11 3 08/25/2018    NRBC 0 08/25/2018   , CMP:   Lab Results   Component Value Date     08/25/2018    K 3 3 (L) 08/25/2018    CL 99 (L) 08/25/2018    CO2 28 08/25/2018    ANIONGAP 9 08/25/2018    BUN 12 08/25/2018    CREATININE 0 64 08/25/2018    GLUCOSE 106 08/25/2018    CALCIUM 8 3 08/25/2018    EGFR 83 08/25/2018     Imaging Studies: I have personally reviewed pertinent reports  and I have personally reviewed pertinent films in PACS  EKG, Pathology, and Other Studies: I have personally reviewed pertinent reports  and pathology showing adenocarcinoma of the cardia    Code Status: Level 3 - DNAR and DNI  Advance Directive and Living Will:      Power of :    POLST:      Counseling / Coordination of Care  Total floor / unit time spent today 45 minutes  Greater than 50% of total time was spent with the patient and / or family counseling and / or coordination of care   A description of the counseling / coordination of care: discussed with RN

## 2018-08-25 NOTE — PROGRESS NOTES
Progress Note - Amandeep Oliveira 1935, 80 y o  female MRN: [id number]    Unit/Bed#: [facility] 630-01 Encounter: [id number]    Primary Care Provider: Stacia Moritz, MD   Date and time admitted to hospital: 8/24/2018  4:35 PM      * Diarrhea   Assessment & Plan    · Likely 2/2 chemo  · C diff was ordered but patient hasn't had a BM  If no further diarrhea, can d/c        Nausea and vomiting   Assessment & Plan    · Likely 2/2 chemo  · Zofran not helping  Add Reglan  · Continue IVFs  She has not been able to keep anything down  · Consult palliative care        Esophageal carcinoma [facility])   Assessment & Plan    · Newly diagnosed  · Received chemo on 8/14  · Patient expresses that she no longer wants to receive chemotherapy or treatment  However, she is ashamed she feels this way because she states she is normally a fighter  She is also afraid she will disappoint her oncologist  Palliative to be consulted  She is agreeable to consultation  Leukocytosis   Assessment & Plan    · Likely due to Neulasta  No current signs/symptoms of infection  Monitor  VTE Pharmacologic Prophylaxis:   Pharmacologic: Enoxaparin (Lovenox)  Mechanical VTE Prophylaxis in Place: Yes    Patient Centered Rounds: I have performed bedside rounds with nursing staff today  Discussions with Specialists or Other Care Team Provider:      Education and Discussions with Family / Patient: Long discussion with patient about goals of care and plan moving forward  Time Spent for Care: 30 minutes  More than 50% of total time spent on counseling and coordination of care as described above  Current Length of Stay: 1 day(s)    Current Patient Status: Inpatient   Certification Statement: The patient will continue to require additional inpatient hospital stay due to intractable nausea and vomiting    Discharge Plan: When nausea and vomiting improved  Code Status: Level 3 - DNAR and DNI      Subjective:   Ongoing nausea and vomiting  Unable to keep anything down  Ate half of her jello and she actively vomited multiple times while I was in the room  No diarrhea since admissin  Objective:     Vitals:   Temp (24hrs), Av 2 °F (36 8 °C), Min:97 7 °F (36 5 °C), Max:98 8 °F (37 1 °C)    HR:  [76-98] 89  Resp:  [18-20] 20  BP: ()/() 120/70  SpO2:  [93 %-98 %] 98 %  There is no height or weight on file to calculate BMI  Input and Output Summary (last 24 hours): Intake/Output Summary (Last 24 hours) at 18 0957  Last data filed at 18 0944   Gross per 24 hour   Intake          1718 33 ml   Output              700 ml   Net          1018 33 ml       Physical Exam:     Physical Exam   Constitutional: She is oriented to person, place, and time  No distress  HENT:   Head: Normocephalic and atraumatic  Eyes: No scleral icterus  Neck: Neck supple  Cardiovascular: Normal rate, regular rhythm and normal heart sounds  Pulmonary/Chest: Effort normal and breath sounds normal  No respiratory distress  She has no wheezes  She has no rales  Abdominal: Soft  Bowel sounds are normal  She exhibits no distension  There is no tenderness  Actively vomited multiple times while I was in the room  Musculoskeletal: Normal range of motion  Neurological: She is alert and oriented to person, place, and time  Skin: Skin is warm and dry  She is not diaphoretic  Psychiatric: She has a normal mood and affect   Her behavior is normal  Thought content normal        Additional Data:     Labs:      Results from last 7 days  Lab Units 18  0514   WBC Thousand/uL 30 26*   HEMOGLOBIN g/dL 10 4*   HEMATOCRIT % 32 2*   PLATELETS Thousands/uL 266   LYMPHO PCT % 4*   MONO PCT MAN % 2*   EOSINO PCT MANUAL % 0       Results from last 7 days  Lab Units 1814  18  1139   SODIUM mmol/L 136  < > 132*   POTASSIUM mmol/L 3 3*  < > 3 4*   CHLORIDE mmol/L 99*  < > 96*   CO2 mmol/L 28  < > 29   BUN mg/dL 12  < > 14   CREATININE mg/dL 0 64  < > 0 88   CALCIUM mg/dL 8 3  < > 9 4   TOTAL PROTEIN g/dL  --   --  6 7   BILIRUBIN TOTAL mg/dL  --   --  0 31   ALK PHOS U/L  --   --  107   ALT U/L  --   --  16   AST U/L  --   --  13   GLUCOSE RANDOM mg/dL 106  < >  --    < > = values in this interval not displayed  * I Have Reviewed All Lab Data Listed Above  * Additional Pertinent Lab Tests Reviewed:  All Labs Within Last 24 Hours Reviewed    Imaging:    Imaging Reports Reviewed Today Include: Abdominal x-ray- negative  Imaging Personally Reviewed by Myself Includes:  None    Recent Cultures (last 7 days):           Last 24 Hours Medication List:     Current Facility-Administered Medications:  acetaminophen 650 mg Oral Q6H PRN Rober Michaels MD    atorvastatin 10 mg Oral Daily Rober Michaels MD    citalopram 20 mg Oral Daily Rober iMchaels MD    enoxaparin 40 mg Subcutaneous Daily Rober Michaels MD    metoclopramide 10 mg Intravenous Q6H PRN Irma Gaytan PA-C    nicotine 1 patch Transdermal Daily Rober Michaels MD    ondansetron 4 mg Intravenous Q6H PRN Rober Michaels MD    pantoprazole 40 mg Intravenous Q24H [facility] Rober Michaels MD    sodium chloride 100 mL/hr Intravenous Continuous Rober Michaels MD Last Rate: 100 mL/hr (08/25/18 0946)   tiotropium 18 mcg Inhalation Daily Rober Michaels MD      Facility-Administered Medications Ordered in Other Encounters:  [START ON 8/27/2018] alteplase 2 mg Intracatheter PRN Franklin John MD   alteplase 2 mg Intracatheter PRN Franklin John MD   aluminum-magnesium hydroxide-simethicone 30 mL Oral Once Franklin John MD   [START ON 8/27/2018] heparin lock flush 300 Units Intracatheter Q1H PRN Franklin John MD   heparin lock flush 300 Units Intracatheter Q1H PRN Franklin John MD   pantoprazole 20 mg Oral Once Franklin John MD        Today, Patient Was Seen By: Irma Gaytna PA-C    ** Please Note: Dragon 360 Dictation voice to text software may have been used in the creation of this document   **

## 2018-08-25 NOTE — MALNUTRITION/BMI
This medical record reflects one or more clinical indicators suggestive of malnutrition  Malnutrition Findings:   Malnutrition type: Acute illness (related to gastric cancer as evidenced by 6% weight loss over the past month and <50% energy intake needs met >5 days treated with: clear liquid diet (patient to possibly transition to hospice))  Degree of Malnutrition: Other severe protein calorie malnutrition  Malnutrition Characteristics: Inadequate energy, Weight loss        See Nutrition note dated 8/25/18 for additional details  Completed nutrition assessment is viewable in the nutrition documentation

## 2018-08-25 NOTE — PLAN OF CARE

## 2018-08-26 PROBLEM — E87.6 HYPOKALEMIA: Status: ACTIVE | Noted: 2018-08-26

## 2018-08-26 PROBLEM — E43 SEVERE PROTEIN-CALORIE MALNUTRITION (HCC): Status: ACTIVE | Noted: 2018-08-26

## 2018-08-26 LAB
ANION GAP SERPL CALCULATED.3IONS-SCNC: 8 MMOL/L (ref 4–13)
BASOPHILS # BLD MANUAL: 0 THOUSAND/UL (ref 0–0.1)
BASOPHILS NFR MAR MANUAL: 0 % (ref 0–1)
BUN SERPL-MCNC: 16 MG/DL (ref 5–25)
CALCIUM SERPL-MCNC: 7.8 MG/DL (ref 8.3–10.1)
CHLORIDE SERPL-SCNC: 104 MMOL/L (ref 100–108)
CO2 SERPL-SCNC: 26 MMOL/L (ref 21–32)
CREAT SERPL-MCNC: 0.67 MG/DL (ref 0.6–1.3)
EOSINOPHIL # BLD MANUAL: 0 THOUSAND/UL (ref 0–0.4)
EOSINOPHIL NFR BLD MANUAL: 0 % (ref 0–6)
ERYTHROCYTE [DISTWIDTH] IN BLOOD BY AUTOMATED COUNT: 13.4 % (ref 11.6–15.1)
GFR SERPL CREATININE-BSD FRML MDRD: 82 ML/MIN/1.73SQ M
GLUCOSE SERPL-MCNC: 79 MG/DL (ref 65–140)
HCT VFR BLD AUTO: 34.1 % (ref 34.8–46.1)
HGB BLD-MCNC: 10.4 G/DL (ref 11.5–15.4)
LYMPHOCYTES # BLD AUTO: 0.36 THOUSAND/UL (ref 0.6–4.47)
LYMPHOCYTES # BLD AUTO: 1 % (ref 14–44)
MCH RBC QN AUTO: 29.3 PG (ref 26.8–34.3)
MCHC RBC AUTO-ENTMCNC: 30.5 G/DL (ref 31.4–37.4)
MCV RBC AUTO: 96 FL (ref 82–98)
MONOCYTES # BLD AUTO: 0 THOUSAND/UL (ref 0–1.22)
MONOCYTES NFR BLD: 0 % (ref 4–12)
NEUTROPHILS # BLD MANUAL: 35.32 THOUSAND/UL (ref 1.85–7.62)
NEUTS BAND NFR BLD MANUAL: 1 % (ref 0–8)
NEUTS SEG NFR BLD AUTO: 98 % (ref 43–75)
NRBC BLD AUTO-RTO: 0 /100 WBCS
PLATELET # BLD AUTO: 261 THOUSANDS/UL (ref 149–390)
PLATELET BLD QL SMEAR: ADEQUATE
PMV BLD AUTO: 10.3 FL (ref 8.9–12.7)
POIKILOCYTOSIS BLD QL SMEAR: PRESENT
POLYCHROMASIA BLD QL SMEAR: PRESENT
POTASSIUM SERPL-SCNC: 3.1 MMOL/L (ref 3.5–5.3)
RBC # BLD AUTO: 3.55 MILLION/UL (ref 3.81–5.12)
RBC MORPH BLD: PRESENT
SODIUM SERPL-SCNC: 138 MMOL/L (ref 136–145)
TOXIC GRANULES BLD QL SMEAR: PRESENT
WBC # BLD AUTO: 35.68 THOUSAND/UL (ref 4.31–10.16)

## 2018-08-26 PROCEDURE — 85007 BL SMEAR W/DIFF WBC COUNT: CPT | Performed by: PHYSICIAN ASSISTANT

## 2018-08-26 PROCEDURE — 99232 SBSQ HOSP IP/OBS MODERATE 35: CPT | Performed by: PHYSICIAN ASSISTANT

## 2018-08-26 PROCEDURE — 85027 COMPLETE CBC AUTOMATED: CPT | Performed by: PHYSICIAN ASSISTANT

## 2018-08-26 PROCEDURE — 80048 BASIC METABOLIC PNL TOTAL CA: CPT | Performed by: PHYSICIAN ASSISTANT

## 2018-08-26 PROCEDURE — C9113 INJ PANTOPRAZOLE SODIUM, VIA: HCPCS | Performed by: INTERNAL MEDICINE

## 2018-08-26 PROCEDURE — 99232 SBSQ HOSP IP/OBS MODERATE 35: CPT | Performed by: FAMILY MEDICINE

## 2018-08-26 RX ORDER — LANOLIN ALCOHOL/MO/W.PET/CERES
6 CREAM (GRAM) TOPICAL
Status: DISCONTINUED | OUTPATIENT
Start: 2018-08-26 | End: 2018-08-28 | Stop reason: HOSPADM

## 2018-08-26 RX ORDER — OLANZAPINE 5 MG/1
5 TABLET, ORALLY DISINTEGRATING ORAL ONCE
Status: COMPLETED | OUTPATIENT
Start: 2018-08-26 | End: 2018-08-26

## 2018-08-26 RX ORDER — CITALOPRAM HYDROBROMIDE 20 MG/10ML
20 SOLUTION, ORAL ORAL DAILY
Status: DISCONTINUED | OUTPATIENT
Start: 2018-08-27 | End: 2018-08-28 | Stop reason: HOSPADM

## 2018-08-26 RX ORDER — SCOLOPAMINE TRANSDERMAL SYSTEM 1 MG/1
1 PATCH, EXTENDED RELEASE TRANSDERMAL
Status: DISCONTINUED | OUTPATIENT
Start: 2018-08-26 | End: 2018-08-28 | Stop reason: HOSPADM

## 2018-08-26 RX ORDER — LANOLIN ALCOHOL/MO/W.PET/CERES
6 CREAM (GRAM) TOPICAL
Status: DISCONTINUED | OUTPATIENT
Start: 2018-08-26 | End: 2018-08-26

## 2018-08-26 RX ORDER — OXYMETAZOLINE HYDROCHLORIDE 0.05 G/100ML
2 SPRAY NASAL EVERY 12 HOURS PRN
Status: DISCONTINUED | OUTPATIENT
Start: 2018-08-26 | End: 2018-08-28 | Stop reason: HOSPADM

## 2018-08-26 RX ORDER — OLANZAPINE 5 MG/1
5 TABLET, ORALLY DISINTEGRATING ORAL 2 TIMES DAILY
Status: DISCONTINUED | OUTPATIENT
Start: 2018-08-26 | End: 2018-08-28 | Stop reason: HOSPADM

## 2018-08-26 RX ORDER — PROMETHAZINE HYDROCHLORIDE 25 MG/ML
12.5 INJECTION, SOLUTION INTRAMUSCULAR; INTRAVENOUS ONCE
Status: COMPLETED | OUTPATIENT
Start: 2018-08-26 | End: 2018-08-27

## 2018-08-26 RX ORDER — ONDANSETRON 2 MG/ML
4 INJECTION INTRAMUSCULAR; INTRAVENOUS EVERY 4 HOURS PRN
Status: DISCONTINUED | OUTPATIENT
Start: 2018-08-26 | End: 2018-08-27

## 2018-08-26 RX ORDER — DEXAMETHASONE SODIUM PHOSPHATE 4 MG/ML
4 INJECTION, SOLUTION INTRA-ARTICULAR; INTRALESIONAL; INTRAMUSCULAR; INTRAVENOUS; SOFT TISSUE 2 TIMES DAILY
Status: DISCONTINUED | OUTPATIENT
Start: 2018-08-26 | End: 2018-08-28 | Stop reason: HOSPADM

## 2018-08-26 RX ORDER — LANOLIN ALCOHOL/MO/W.PET/CERES
3 CREAM (GRAM) TOPICAL ONCE
Status: COMPLETED | OUTPATIENT
Start: 2018-08-26 | End: 2018-08-26

## 2018-08-26 RX ORDER — POTASSIUM CHLORIDE AND SODIUM CHLORIDE 900; 300 MG/100ML; MG/100ML
75 INJECTION, SOLUTION INTRAVENOUS CONTINUOUS
Status: DISCONTINUED | OUTPATIENT
Start: 2018-08-26 | End: 2018-08-27

## 2018-08-26 RX ADMIN — DEXAMETHASONE SODIUM PHOSPHATE 4 MG: 4 INJECTION, SOLUTION INTRAMUSCULAR; INTRAVENOUS at 20:21

## 2018-08-26 RX ADMIN — SCOPOLAMINE 1 PATCH: 1 PATCH, EXTENDED RELEASE TRANSDERMAL at 19:43

## 2018-08-26 RX ADMIN — MELATONIN TAB 3 MG 3 MG: 3 TAB at 02:40

## 2018-08-26 RX ADMIN — SODIUM CHLORIDE 100 ML/HR: 0.9 INJECTION, SOLUTION INTRAVENOUS at 02:42

## 2018-08-26 RX ADMIN — PANTOPRAZOLE SODIUM 40 MG: 40 INJECTION, POWDER, FOR SOLUTION INTRAVENOUS at 08:23

## 2018-08-26 RX ADMIN — ONDANSETRON 4 MG: 2 INJECTION INTRAMUSCULAR; INTRAVENOUS at 13:23

## 2018-08-26 RX ADMIN — OLANZAPINE 5 MG: 5 TABLET, ORALLY DISINTEGRATING ORAL at 13:24

## 2018-08-26 RX ADMIN — DEXAMETHASONE SODIUM PHOSPHATE 2 MG: 4 INJECTION, SOLUTION INTRAMUSCULAR; INTRAVENOUS at 13:23

## 2018-08-26 RX ADMIN — OLANZAPINE 5 MG: 5 TABLET, ORALLY DISINTEGRATING ORAL at 20:21

## 2018-08-26 RX ADMIN — POTASSIUM CHLORIDE AND SODIUM CHLORIDE 75 ML/HR: 900; 300 INJECTION, SOLUTION INTRAVENOUS at 12:14

## 2018-08-26 NOTE — PROGRESS NOTES
Pt frustrated this morning as she continue to vomit frequently  She refused all medication this morning except requested to take protonix which I gave  She says nothing is helping her and therefore she doesn't want to take it    She states she "just wants to go home and be comfortable" 4

## 2018-08-26 NOTE — ASSESSMENT & PLAN NOTE
· Newly diagnosed  · Received chemo on 8/14  · Patient expresses that she no longer wants to receive chemotherapy or treatment  However, she is ashamed she feels this way because she states she is normally a fighter  She is also afraid she will disappoint her oncologist and family  · Palliative is following patient  Hospice liaison was consulted as patient was interested in hospice services  Goal will be to improve symptoms prior to discharge home

## 2018-08-26 NOTE — ASSESSMENT & PLAN NOTE
Malnutrition Findings:   Malnutrition type: Acute illness (related to gastric cancer as evidenced by 6% weight loss over the past month and <50% energy intake needs met >5 days treated with: clear liquid diet (patient to possibly transition to hospice))  Degree of Malnutrition: Other severe protein calorie malnutrition

## 2018-08-26 NOTE — PLAN OF CARE
INFECTION - ADULT     Absence or prevention of progression during hospitalization Progressing     Absence of fever/infection during neutropenic period Progressing        Nutrition/Hydration-ADULT     Nutrient/Hydration intake appropriate for improving, restoring or maintaining nutritional needs Progressing        PAIN - ADULT     Verbalizes/displays adequate comfort level or baseline comfort level Progressing        Potential for Falls     Patient will remain free of falls Progressing        Prexisting or High Potential for Compromised Skin Integrity     Skin integrity is maintained or improved Progressing        SAFETY ADULT     Patient will remain free of falls Progressing     Maintain or return to baseline ADL function Progressing     Maintain or return mobility status to optimal level Progressing

## 2018-08-26 NOTE — PROGRESS NOTES
Progress Note - Heidi Brownlee 1935, 80 y o  female MRN: [id number]    Unit/Bed#: [facility] [place] 630-01 Encounter: [id number]    Primary Care Provider: Mary Anne Gutierrez MD   Date and time admitted to hospital: 8/24/2018  4:35 PM    * Nausea and vomiting   Assessment & Plan    · Intractable nausea and vomiting, likely 2/2 chemo  Consider also related to large tumor  EGD showed circumferential mass at level of the EG junction and tumor occupying 75-99% circumference of the cardia  · Zofran not helping  · Continue IVFs  She has not been able to keep anything down  · Appreciate palliative input- patient started on olanzapine and Decadron  Unfortunately, not helping  Will discuss with palliative today  · Liberate diet- patient would like to try toast/crackers        Severe protein-calorie malnutrition ([address] )   Assessment & Plan    Malnutrition Findings:   Malnutrition type: Acute illness (related to gastric cancer as evidenced by 6% weight loss over the past month and <50% energy intake needs met >5 days treated with: clear liquid diet (patient to possibly transition to hospice))  Degree of Malnutrition: Other severe protein calorie malnutrition          Esophageal carcinoma (HCC)   Assessment & Plan    · Newly diagnosed  · Received chemo on 8/14  · Patient expresses that she no longer wants to receive chemotherapy or treatment  However, she is ashamed she feels this way because she states she is normally a fighter  She is also afraid she will disappoint her oncologist and family  · Palliative is following patient  Hospice liaison was consulted as patient was interested in hospice services  Goal will be to improve symptoms prior to discharge home          Diarrhea   Assessment & Plan    · Likely 2/2 chemo  · C diff was ordered but patient has had no further diarrhea  · D/C cdiff and contact precautions        Hypokalemia   Assessment & Plan    · Add potassium to fluids  · Check Mg        Leukocytosis   Assessment & Plan    · Likely due to Neulasta  No current signs/symptoms of infection  Monitor  VTE Pharmacologic Prophylaxis:   Pharmacologic: Enoxaparin (Lovenox)  Mechanical VTE Prophylaxis in Place: Yes    Patient Centered Rounds: I have performed bedside rounds with nursing staff today  Discussions with Specialists or Other Care Team Provider: Will discuss with palliative when on the floor    Education and Discussions with Family / Patient: Patient updated on plan of care    Time Spent for Care: 20 minutes  More than 50% of total time spent on counseling and coordination of care as described above  Current Length of Stay: 2 day(s)    Current Patient Status: Inpatient   Certification Statement: The patient will continue to require additional inpatient hospital stay due to intractable nausea and vomiting    Discharge Plan: Home likely with home hospice once symptoms better managed    Code Status: Level 3 - DNAR and DNI      Subjective:   Continues to have intractable nausea and vomiting  Unable to keep anything down  No diarrhea or abdominal pain  Objective:     Vitals:   Temp (24hrs), Av 8 °F (37 1 °C), Min:98 42 °F (36 9 °C), Max:99 1 °F (37 3 °C)    HR:  [78-91] 86  Resp:  [18-20] 18  BP: (136-144)/(76-79) 136/79  SpO2:  [93 %-96 %] 93 %  There is no height or weight on file to calculate BMI  Input and Output Summary (last 24 hours): Intake/Output Summary (Last 24 hours) at 18 0930  Last data filed at 18 0260   Gross per 24 hour   Intake          2531 66 ml   Output             1475 ml   Net          1056 66 ml       Physical Exam:     Physical Exam   Constitutional: She is oriented to person, place, and time  Frail   HENT:   Head: Normocephalic and atraumatic  Eyes: No scleral icterus  Neck: Neck supple  Cardiovascular: Normal rate, regular rhythm and normal heart sounds  Pulmonary/Chest: Effort normal and breath sounds normal  No respiratory distress   She has no wheezes  Port left chest wall   Abdominal: Soft  Bowel sounds are normal  She exhibits no distension  There is no tenderness  Musculoskeletal: Normal range of motion  Neurological: She is alert and oriented to person, place, and time  Skin: Skin is warm and dry  Psychiatric: She has a normal mood and affect  Her behavior is normal  Thought content normal        Additional Data:     Labs:      Results from last 7 days  Lab Units 08/26/18  0652 08/25/18  0514   WBC Thousand/uL 35 68* 30 26*   HEMOGLOBIN g/dL 10 4* 10 4*   HEMATOCRIT % 34 1* 32 2*   PLATELETS Thousands/uL 261 266   LYMPHO PCT %  --  4*   MONO PCT MAN %  --  2*   EOSINO PCT MANUAL %  --  0       Results from last 7 days  Lab Units 08/26/18  0652  08/22/18  1139   SODIUM mmol/L 138  < > 132*   POTASSIUM mmol/L 3 1*  < > 3 4*   CHLORIDE mmol/L 104  < > 96*   CO2 mmol/L 26  < > 29   BUN mg/dL 16  < > 14   CREATININE mg/dL 0 67  < > 0 88   CALCIUM mg/dL 7 8*  < > 9 4   TOTAL PROTEIN g/dL  --   --  6 7   BILIRUBIN TOTAL mg/dL  --   --  0 31   ALK PHOS U/L  --   --  107   ALT U/L  --   --  16   AST U/L  --   --  13   GLUCOSE RANDOM mg/dL 79  < >  --    < > = values in this interval not displayed  * I Have Reviewed All Lab Data Listed Above  * Additional Pertinent Lab Tests Reviewed:  All Labs Within Last 24 Hours Reviewed    Imaging:    Imaging Reports Reviewed Today Include: None  Imaging Personally Reviewed by Myself Includes:  None    Recent Cultures (last 7 days):           Last 24 Hours Medication List:     Current Facility-Administered Medications:  acetaminophen 650 mg Oral Q6H PRN Yola Joseph MD   aluminum-magnesium hydroxide-simethicone 20 mL Oral Q4H PRN Marquita Whyte PA-C   atorvastatin 10 mg Oral Daily Yola Joseph MD   citalopram 20 mg Oral Daily Yola Joseph MD   dexamethasone 2 mg Intravenous BID Fred Rubio MD   enoxaparin 40 mg Subcutaneous Daily Yola Joseph MD   melatonin 6 mg Oral HS Gonzales Musa PA-C   nicotine 1 patch Transdermal Daily Franco Prader, MD   OLANZapine 2 5 mg Oral Daily Fred Rubio MD   OLANZapine 5 mg Oral HS Fred Rubio MD   ondansetron 4 mg Intravenous Q6H PRN Franco Prader, MD   pantoprazole 40 mg Intravenous Q24H [address] Franco Prader, MD   sodium chloride 0 9 % with KCl 40 mEq/L 75 mL/hr Intravenous Continuous Pushpa Kulkarni PA-C   tiotropium 18 mcg Inhalation Daily Franco Prader, MD     Facility-Administered Medications Ordered in Other Encounters:  [START ON 8/27/2018] alteplase 2 mg Intracatheter PRN Nahun Clarke MD   alteplase 2 mg Intracatheter PRN Nahun Clarke MD   aluminum-magnesium hydroxide-simethicone 30 mL Oral Once Nahun Clarke MD   [START ON 8/27/2018] heparin lock flush 300 Units Intracatheter Q1H PRN Nahun Clarke MD   heparin lock flush 300 Units Intracatheter Q1H PRN Nahun Clarke MD   pantoprazole 20 mg Oral Once Nahun Clarke MD        Today, Patient Was Seen By: Pushpa Kulkarni PA-C    ** Please Note: Dragon 360 Dictation voice to text software may have been used in the creation of this document   **

## 2018-08-26 NOTE — PROGRESS NOTES
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  Patient's presentation was decreased appetite and weight loss  A CT of the chest abdomen pelvis was performed on 07/02/2018   This showed a large hiatal hernia, subtle narrowing of the distal esophagus at the GE junction   No evidence of metastatic disease in the chest or abdomen  Patient then had a PET/CT on 07/18/2018   This showed prominent focal uptake at the anterior wall of the distal esophagus/proximal stomach   No other hypermetabolism was noted  Patient was referred to have an additional EGD with EUS   This was performed by Dr Olivia Nye on 07/27/2018  There was a circumferential mass at the level of the EG junction   Distal esophagus was noted to be normal  No evidence of Pelaez's esophagus in the esophagus   A tumor was found in the cardia   Biopsies were taken from this    The duodenum was normal  There was wall thickening at the distal 1-2 cm of the esophagus and the GE junction   The GE junction measured about 1 2 cm in thickness   Tumor staging by U/S was T3 N0   Of note, T staging was suboptimal due to the tangential position of the mass in the cardia    Pathology of the mass in the cardia showed adenocarcinoma  Due to underlying lung disease and continuing to smoke cigarettes, patient likely is not a good surgical candidate  Initial plan was Taxol and carboplatin with concurrent radiation  Patient was evaluated by Dr Alphonso Welch, surgical oncologist and he did not rule out surgery  The plan changed to [facility] T chemotherapy  Patient received 1 cycle of 5 FU, leucovorin, oxaliplatin and Taxotere chemotherapy and also Neulasta    Patient has nausea, vomiting, diarrhea and dehydration  She is eating and drinking poorly and is feeling weak and tired  Reviewed medications    No Known Allergies       Cancer of abdominal esophagus ([facility] [address] )    6/20/2018 Biopsy     EGD :medium-sized friable tumor, that   measured 4 cm in size, in the distal third of the esophagus and GE   junction  PATH:-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  -Submitted P53 stain is focally positive           7/5/2018 Initial Diagnosis     Cancer of abdominal esophagus (HCC)          ROS:   Reviewed 13 systems:  Presently no headaches, seizures, dizziness, diplopia, dysphagia, hoarseness, chest pain, palpitations, shortness of breath, cough, hemoptysis, abdominal pain,  melena, hematuria, fever, chills, bleeding, bone pains, skin rash, numbness,  claudication and gait problem  No frequent infections  Not unusually sensitive to heat or cold  No swelling of the ankles  No swollen glands  Patient is anxious       No GYN symptoms  Other symptoms are in HPI        BP 90/59 (BP Location: Right arm, Cuff Size: Large)   Pulse 98   Temp 98 6 °F (37 °C) (Tympanic)   Resp 18   Ht 5' 1 54" (1 563 m)   Wt 68 5 kg (151 lb)   SpO2 95%   BMI 28 03 kg/m²     Physical Exam:  Alert, oriented, not in distress, no icterus, no oral thrush, no palpable neck mass, clear lung fields, regular heart rate, systolic murmur, abdomen  soft and non tender, no palpable abdominal mass, no ascites, no edema of ankles, no calf tenderness, no focal neurological deficit, no skin rash, no palpable lymphadenopathy, good arterial pulses, no clubbing  Patient is anxious  Performance status 1      IMAGING:      LABS:  Results for orders placed or performed in visit on 08/22/18   CBC and differential   Result Value Ref Range    WBC 17 74 (H) 4 31 - 10 16 Thousand/uL    RBC 3 98 3 81 - 5 12 Million/uL    Hemoglobin 11 8 11 5 - 15 4 g/dL    Hematocrit 35 8 34 8 - 46 1 %    MCV 90 82 - 98 fL MCH 29 6 26 8 - 34 3 pg    MCHC 33 0 31 4 - 37 4 g/dL    RDW 12 9 11 6 - 15 1 %    MPV 11 2 8 9 - 12 7 fL    Platelets 164 789 - 906 Thousands/uL    nRBC 0 /100 WBCs   Comprehensive metabolic panel   Result Value Ref Range    Sodium 132 (L) 136 - 145 mmol/L    Potassium 3 4 (L) 3 5 - 5 3 mmol/L    Chloride 96 (L) 100 - 108 mmol/L    CO2 29 21 - 32 mmol/L    Anion Gap 7 4 - 13 mmol/L    BUN 14 5 - 25 mg/dL    Creatinine 0 88 0 60 - 1 30 mg/dL    Glucose, Fasting 100 (H) 65 - 99 mg/dL    Calcium 9 4 8 3 - 10 1 mg/dL    AST 13 5 - 45 U/L    ALT 16 12 - 78 U/L    Alkaline Phosphatase 107 46 - 116 U/L    Total Protein 6 7 6 4 - 8 2 g/dL    Albumin 2 9 (L) 3 5 - 5 0 g/dL    Total Bilirubin 0 31 0 20 - 1 00 mg/dL    eGFR 61 ml/min/1 73sq m   Manual Differential(PHLEBS Do Not Order)   Result Value Ref Range    Segmented % 71 43 - 75 %    Bands % 3 0 - 8 %    Lymphocytes % 14 14 - 44 %    Monocytes % 11 4 - 12 %    Eosinophils % 0 0 - 6 %    Basophils % 0 0 - 1 %    Promyelocytes % 1 (H) 0 - 0 %    Absolute Neutrophils 13 13 (H) 1 85 - 7 62 Thousand/uL    Lymphocytes Absolute 2 48 0 60 - 4 47 Thousand/uL    Monocytes Absolute 1 95 (H) 0 00 - 1 22 Thousand/uL    Eosinophils Absolute 0 00 0 00 - 0 40 Thousand/uL    Basophils Absolute 0 00 0 00 - 0 10 Thousand/uL    Total Counted      Toxic Granulation Present     RBC Morphology Present     Poikilocytes Present     Polychromasia Present     Platelet Estimate Adequate Adequate     Labs, Imaging, & Other studies:   All pertinent labs and imaging studies were personally reviewed    Lab Results   Component Value Date     08/25/2018    K 3 3 (L) 08/25/2018    CL 99 (L) 08/25/2018    CO2 28 08/25/2018    ANIONGAP 9 08/25/2018    BUN 12 08/25/2018    CREATININE 0 64 08/25/2018    GLUCOSE 106 08/25/2018    GLUF 100 (H) 08/22/2018    CALCIUM 8 3 08/25/2018    AST 13 08/22/2018    ALT 16 08/22/2018    ALKPHOS 107 08/22/2018    PROT 6 7 08/22/2018    BILITOT 0 31 08/22/2018    EGFR 83 08/25/2018     Lab Results   Component Value Date    WBC 30 26 (HH) 08/25/2018    HGB 10 4 (L) 08/25/2018    HCT 32 2 (L) 08/25/2018    MCV 91 08/25/2018     08/25/2018   No results found for: [address] [place] [address] [place] and discussed with patient  Assessment and plan: 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 based on EGD and EUS done by Dr Link Butler  Initial EGD was done in June 2018 by Dr Edgar Hammond and that was superficial biopsy from distal esophagus/GE junction that showed 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  Patient's presentation was decreased appetite and weight loss  A CT of the chest abdomen pelvis was performed on 07/02/2018   This showed a large hiatal hernia, subtle narrowing of the distal esophagus at the GE junction   No evidence of metastatic disease in the chest or abdomen  Patient then had a PET/CT on 07/18/2018   This showed prominent focal uptake at the anterior wall of the distal esophagus/proximal stomach   No other hypermetabolism was noted  Patient was referred to have an additional EGD with EUS   This was performed by Dr Link Butler on 07/27/2018  There was a circumferential mass at the level of the EG junction   Distal esophagus was noted to be normal  No evidence of Pelaez's esophagus in the esophagus   A tumor was found in the cardia   Biopsies were taken from this    The duodenum was normal  There was wall thickening at the distal 1-2 cm of the esophagus and the GE junction   The GE junction measured about 1 2 cm in thickness   Tumor staging by U/S was T3 N0   Of note, T staging was suboptimal due to the tangential position of the mass in the cardia    Pathology of the mass in the cardia showed adenocarcinoma  Due to underlying lung disease and continuing to smoke cigarettes, patient likely is not a good surgical candidate  Initial plan was Taxol and carboplatin with concurrent radiation    Patient was evaluated by Dr Lien Abreu, surgical oncologist and he did not rule out surgery  The plan changed to [facility] T chemotherapy  Patient received 1 cycle of 5 FU, leucovorin, oxaliplatin and Taxotere chemotherapy and also Neulasta  Patient has nausea, vomiting, diarrhea and dehydration  She is eating and drinking poorly and is feeling weak and tired  Physical examination and test results are as recorded and discussed  Patient was thinking of not continuing chemotherapy anymore  After some discussion the decision was she will go to infusion center today for hydration and antiemetics and her chemotherapy medication 5 FU infusion dose will be reduced and chemotherapy will be delayed by 1 week  From the office she was taken to the infusion center  We had a long and crystal discussion  1  Gastric carcinoma ([facility] [address] )      2  Diarrhea, unspecified type      3  Dehydration          Patient voiced understanding and agreement in the discussion  Addendum:  Call came from infusion center nurse that patient is not feeling any better with hydration and antiemetics  Arrangements were made for her admission  Counseling / Coordination of Care: 50 min   Greater than 50% of total time was spent with the patient and / or family counseling and / or coordination of care

## 2018-08-26 NOTE — SOCIAL WORK
Pt is direct admit from infusion center  Pt states she lives alone, driving and independent  Primary contact is sister Merlin Birch [phone]  Pt interested in home hospice  [facility] referral sent to [facility] re: same  CM following for discharge needs  CM reviewed d/c planning process including the following: identifying help at home, patient preference for d/c planning needs, Discharge Lounge, [facility] Meds to Bed program, availability of treatment team to discuss questions or concerns patient and/or family may have regarding understanding medications and recognizing signs and symptoms once discharged  CM also encouraged patient to follow up with all recommended appointments after discharge  Patient advised of importance for patient and family to participate in managing patients medical well being

## 2018-08-26 NOTE — PROGRESS NOTES
Progress Note - Palliative & Supportive Care  Julian Teresa  80 y o   female  [facility] 630/[facility] 630-01   MRN: [id number]  Encounter: [id number]     Assessment  Patient Active Problem List   Diagnosis    Cancer of abdominal esophagus (HCC)    COPD (chronic obstructive pulmonary disease) (HCC)    GERD (gastroesophageal reflux disease)    Ulcerative colitis (HCC)    Asthma    Arthritis    Hyperlipidemia    Iron deficiency anemia    Esophageal carcinoma (HCC)    Diarrhea    Dehydration    Nausea and vomiting    Leukocytosis    Hypokalemia    Severe protein-calorie malnutrition (HCC)     Plan:  Symptom Management:   Intractable Nausea/vomitting - will attempt to intensify pharmacotherapy for this condition with an increase in dexamethasone to 4mg bid, and an increase in zyprexa to 5mg bid  Add scopatch to reduce GI secretions  However given findings on her EGD of circumferential mass at level of the EG junction and tumor occupying 75-99% circumference of the cardia, I suspected that this problem is mostly mechanical and that unfortunately pharmacotherapy will have limited therapeutic benefit  I have discontinued unnecessary medications such as lipitor and melatonin  Celexa changed to oral solution    Goals of Care:   Level 3 code status  Hospice referral has been placed    History  Patient has no pain however she continues to have nausea and vomiting  She states that it is a little better than yesterday and asks when she can get out of here  ROS: Pertinent items are noted in HPI      Meds  all current active meds have been reviewed and current meds:   Current Facility-Administered Medications   Medication Dose Route Frequency    acetaminophen (TYLENOL) tablet 650 mg  650 mg Oral Q6H PRN    aluminum-magnesium hydroxide-simethicone (MYLANTA) 200-200-20 mg/5 mL oral suspension 20 mL  20 mL Oral Q4H PRN    citalopram (CeleXA) tablet 20 mg  20 mg Oral Daily    dexamethasone (DECADRON) injection 4 mg 4 mg Intravenous BID    enoxaparin (LOVENOX) subcutaneous injection 40 mg  40 mg Subcutaneous Daily    melatonin tablet 6 mg  6 mg Oral HS    nicotine (NICODERM CQ) 14 mg/24hr TD 24 hr patch 1 patch  1 patch Transdermal Daily    OLANZapine (ZyPREXA ZYDIS) dispersible tablet 5 mg  5 mg Oral BID    ondansetron (ZOFRAN) injection 4 mg  4 mg Intravenous Q4H PRN    pantoprazole (PROTONIX) injection 40 mg  40 mg Intravenous Q24H DRU    scopolamine (TRANSDERM-SCOP) 1 5 mg/3 days TD 72 hr patch 1 patch  1 patch Transdermal Q72H    sodium chloride 0 9 % with KCl 40 mEq/L infusion (premix)  75 mL/hr Intravenous Continuous    tiotropium (SPIRIVA) capsule for inhaler 18 mcg  18 mcg Inhalation Daily     Facility-Administered Medications Ordered in Other Encounters   Medication Dose Route Frequency    [START ON 8/27/2018] alteplase (CATHFLO) injection 2 mg  2 mg Intracatheter PRN    alteplase (CATHFLO) injection 2 mg  2 mg Intracatheter PRN    aluminum-magnesium hydroxide-simethicone (MYLANTA) 200-200-20 mg/5 mL oral suspension 30 mL  30 mL Oral Once    [START ON 8/27/2018] heparin lock flush 100 units/mL injection 300 Units  300 Units Intracatheter Q1H PRN    heparin lock flush 100 units/mL injection 300 Units  300 Units Intracatheter Q1H PRN    pantoprazole (PROTONIX) EC tablet 20 mg  20 mg Oral Once       No Known Allergies    Objective  Physical Exam:   Vitals:    08/26/18 1505   BP: 139/78   Pulse: 78   Resp: 17   Temp: 97 88 °F (36 6 °C)   SpO2: 96%   Constitutional: Appears well-developed and well-nourished  In no acute distress  Head: Normocephalic and atraumatic  Eyes: EOM are normal  Right eye exhibits no discharge  Left eye exhibits no discharge  No scleral icterus  Pulmonary/Chest: Effort normal  No stridor  No respiratory distress  Abdominal: No distension  Coffee ground emesis in bedside basin  Musculoskeletal: No edema  Neurological: Alert, oriented and appropriately conversant     Skin: pale  Psychiatric: Displays a normal mood and affect  Behavior, judgement and thought content appear normal    Vitals reviewed  Lab Results:   I have personally reviewed pertinent labs  , CBC:   Lab Results   Component Value Date    WBC 35 68 (HH) 08/26/2018    HGB 10 4 (L) 08/26/2018    HCT 34 1 (L) 08/26/2018    MCV 96 08/26/2018     08/26/2018    MCH 29 3 08/26/2018    MCHC 30 5 (L) 08/26/2018    RDW 13 4 08/26/2018    MPV 10 3 08/26/2018    NRBC 0 08/26/2018   , CMP:   Lab Results   Component Value Date     08/26/2018    K 3 1 (L) 08/26/2018     08/26/2018    CO2 26 08/26/2018    ANIONGAP 8 08/26/2018    BUN 16 08/26/2018    CREATININE 0 67 08/26/2018    GLUCOSE 79 08/26/2018    CALCIUM 7 8 (L) 08/26/2018    EGFR 82 08/26/2018       Counseling / Coordination of Care  Total floor / unit time spent today 20 minutes       Kem Bumpers, MD  Palliative & Supportive Care  Office number: [phone]

## 2018-08-26 NOTE — ASSESSMENT & PLAN NOTE
· Likely 2/2 chemo  · C diff was ordered but patient has had no further diarrhea  · D/C cdiff and contact precautions

## 2018-08-26 NOTE — ASSESSMENT & PLAN NOTE
· Intractable nausea and vomiting, likely 2/2 chemo  · Zofran not helping  · Continue IVFs  She has not been able to keep anything down  · Appreciate palliative input- patient started on olanzapine and Decadron  Unfortunately, not helping  Will discuss with palliative today    · Liberate diet- patient would like to try toast/crackers

## 2018-08-27 ENCOUNTER — HOSPITAL ENCOUNTER (OUTPATIENT)
Dept: INFUSION CENTER | Facility: HOSPITAL | Age: 83
Discharge: HOME | End: 2018-08-27

## 2018-08-27 LAB
ANION GAP SERPL CALCULATED.3IONS-SCNC: 10 MMOL/L (ref 4–13)
ANISOCYTOSIS BLD QL SMEAR: PRESENT
BASOPHILS # BLD MANUAL: 0 THOUSAND/UL (ref 0–0.1)
BASOPHILS # BLD MANUAL: 0 THOUSAND/UL (ref 0–0.1)
BASOPHILS NFR MAR MANUAL: 0 % (ref 0–1)
BASOPHILS NFR MAR MANUAL: 0 % (ref 0–1)
BUN SERPL-MCNC: 21 MG/DL (ref 5–25)
CALCIUM SERPL-MCNC: 8 MG/DL (ref 8.3–10.1)
CHLORIDE SERPL-SCNC: 107 MMOL/L (ref 100–108)
CO2 SERPL-SCNC: 25 MMOL/L (ref 21–32)
CREAT SERPL-MCNC: 0.58 MG/DL (ref 0.6–1.3)
EOSINOPHIL # BLD MANUAL: 0 THOUSAND/UL (ref 0–0.4)
EOSINOPHIL # BLD MANUAL: 0 THOUSAND/UL (ref 0–0.4)
EOSINOPHIL NFR BLD MANUAL: 0 % (ref 0–6)
EOSINOPHIL NFR BLD MANUAL: 0 % (ref 0–6)
ERYTHROCYTE [DISTWIDTH] IN BLOOD BY AUTOMATED COUNT: 13.6 % (ref 11.6–15.1)
ERYTHROCYTE [DISTWIDTH] IN BLOOD BY AUTOMATED COUNT: 14 % (ref 11.6–15.1)
GFR SERPL CREATININE-BSD FRML MDRD: 86 ML/MIN/1.73SQ M
GIANT PLATELETS BLD QL SMEAR: PRESENT
GLUCOSE SERPL-MCNC: 103 MG/DL (ref 65–140)
HCT VFR BLD AUTO: 32 % (ref 34.8–46.1)
HCT VFR BLD AUTO: 33 % (ref 34.8–46.1)
HGB BLD-MCNC: 10.1 G/DL (ref 11.5–15.4)
HGB BLD-MCNC: 10.2 G/DL (ref 11.5–15.4)
HYPERCHROMIA BLD QL SMEAR: PRESENT
HYPERCHROMIA BLD QL SMEAR: PRESENT
LYMPHOCYTES # BLD AUTO: 0 % (ref 14–44)
LYMPHOCYTES # BLD AUTO: 0 THOUSAND/UL (ref 0.6–4.47)
LYMPHOCYTES # BLD AUTO: 2.61 THOUSAND/UL (ref 0.6–4.47)
LYMPHOCYTES # BLD AUTO: 6 % (ref 14–44)
MAGNESIUM SERPL-MCNC: 1.1 MG/DL (ref 1.6–2.6)
MCH RBC QN AUTO: 29 PG (ref 26.8–34.3)
MCH RBC QN AUTO: 29.9 PG (ref 26.8–34.3)
MCHC RBC AUTO-ENTMCNC: 30.9 G/DL (ref 31.4–37.4)
MCHC RBC AUTO-ENTMCNC: 31.6 G/DL (ref 31.4–37.4)
MCV RBC AUTO: 92 FL (ref 82–98)
MCV RBC AUTO: 97 FL (ref 82–98)
MONOCYTES # BLD AUTO: 0.39 THOUSAND/UL (ref 0–1.22)
MONOCYTES # BLD AUTO: 1.74 THOUSAND/UL (ref 0–1.22)
MONOCYTES NFR BLD: 1 % (ref 4–12)
MONOCYTES NFR BLD: 4 % (ref 4–12)
MYELOCYTES NFR BLD MANUAL: 2 % (ref 0–1)
NEUTROPHILS # BLD MANUAL: 38.22 THOUSAND/UL (ref 1.85–7.62)
NEUTROPHILS # BLD MANUAL: 38.3 THOUSAND/UL (ref 1.85–7.62)
NEUTS BAND NFR BLD MANUAL: 1 % (ref 0–8)
NEUTS BAND NFR BLD MANUAL: 2 % (ref 0–8)
NEUTS SEG NFR BLD AUTO: 87 % (ref 43–75)
NEUTS SEG NFR BLD AUTO: 97 % (ref 43–75)
NRBC BLD AUTO-RTO: 0 /100 WBCS
NRBC BLD AUTO-RTO: 0 /100 WBCS
OVALOCYTES BLD QL SMEAR: PRESENT
PLATELET # BLD AUTO: 254 THOUSANDS/UL (ref 149–390)
PLATELET # BLD AUTO: 257 THOUSANDS/UL (ref 149–390)
PLATELET BLD QL SMEAR: ADEQUATE
PLATELET BLD QL SMEAR: ADEQUATE
PMV BLD AUTO: 10 FL (ref 8.9–12.7)
PMV BLD AUTO: 10.3 FL (ref 8.9–12.7)
POLYCHROMASIA BLD QL SMEAR: PRESENT
POLYCHROMASIA BLD QL SMEAR: PRESENT
POTASSIUM SERPL-SCNC: 3.7 MMOL/L (ref 3.5–5.3)
RBC # BLD AUTO: 3.41 MILLION/UL (ref 3.81–5.12)
RBC # BLD AUTO: 3.48 MILLION/UL (ref 3.81–5.12)
RBC MORPH BLD: PRESENT
SODIUM SERPL-SCNC: 142 MMOL/L (ref 136–145)
TOTAL CELLS COUNTED SPEC: 100
TOXIC GRANULES BLD QL SMEAR: PRESENT
WBC # BLD AUTO: 38.69 THOUSAND/UL (ref 4.31–10.16)
WBC # BLD AUTO: 43.43 THOUSAND/UL (ref 4.31–10.16)
WBC TOXIC VACUOLES BLD QL SMEAR: PRESENT

## 2018-08-27 PROCEDURE — 85007 BL SMEAR W/DIFF WBC COUNT: CPT | Performed by: PHYSICIAN ASSISTANT

## 2018-08-27 PROCEDURE — 85027 COMPLETE CBC AUTOMATED: CPT | Performed by: PHYSICIAN ASSISTANT

## 2018-08-27 PROCEDURE — 87081 CULTURE SCREEN ONLY: CPT | Performed by: PHYSICIAN ASSISTANT

## 2018-08-27 PROCEDURE — 83735 ASSAY OF MAGNESIUM: CPT | Performed by: PHYSICIAN ASSISTANT

## 2018-08-27 PROCEDURE — 99231 SBSQ HOSP IP/OBS SF/LOW 25: CPT | Performed by: INTERNAL MEDICINE

## 2018-08-27 PROCEDURE — 99232 SBSQ HOSP IP/OBS MODERATE 35: CPT | Performed by: PHYSICIAN ASSISTANT

## 2018-08-27 PROCEDURE — 80048 BASIC METABOLIC PNL TOTAL CA: CPT | Performed by: PHYSICIAN ASSISTANT

## 2018-08-27 PROCEDURE — C9113 INJ PANTOPRAZOLE SODIUM, VIA: HCPCS | Performed by: INTERNAL MEDICINE

## 2018-08-27 RX ORDER — LORAZEPAM 2 MG/ML
0.5 CONCENTRATE ORAL EVERY 4 HOURS PRN
Qty: 30 ML | Refills: 0 | Status: SHIPPED | OUTPATIENT
Start: 2018-08-27 | End: 2018-09-06

## 2018-08-27 RX ORDER — PANTOPRAZOLE SODIUM 40 MG/1
40 TABLET, DELAYED RELEASE ORAL
Status: DISCONTINUED | OUTPATIENT
Start: 2018-08-28 | End: 2018-08-28 | Stop reason: HOSPADM

## 2018-08-27 RX ORDER — HALOPERIDOL 2 MG/ML
1 SOLUTION ORAL EVERY 4 HOURS PRN
Qty: 30 ML | Refills: 0 | Status: SHIPPED | OUTPATIENT
Start: 2018-08-27

## 2018-08-27 RX ORDER — OLANZAPINE 5 MG/1
5 TABLET, ORALLY DISINTEGRATING ORAL 2 TIMES DAILY
Qty: 15 TABLET | Refills: 0 | Status: SHIPPED | OUTPATIENT
Start: 2018-08-27

## 2018-08-27 RX ORDER — CLONIDINE HYDROCHLORIDE 0.1 MG/1
0.1 TABLET ORAL ONCE
Status: COMPLETED | OUTPATIENT
Start: 2018-08-27 | End: 2018-08-27

## 2018-08-27 RX ORDER — SCOLOPAMINE TRANSDERMAL SYSTEM 1 MG/1
1 PATCH, EXTENDED RELEASE TRANSDERMAL
Qty: 4 PATCH | Refills: 0 | Status: SHIPPED | OUTPATIENT
Start: 2018-08-29

## 2018-08-27 RX ORDER — MORPHINE SULFATE 100 MG/5ML
5 SOLUTION ORAL EVERY 2 HOUR PRN
Qty: 30 ML | Refills: 0 | Status: SHIPPED | OUTPATIENT
Start: 2018-08-27 | End: 2018-09-06

## 2018-08-27 RX ORDER — ONDANSETRON 4 MG/1
4 TABLET, ORALLY DISINTEGRATING ORAL EVERY 6 HOURS PRN
Status: DISCONTINUED | OUTPATIENT
Start: 2018-08-27 | End: 2018-08-28 | Stop reason: HOSPADM

## 2018-08-27 RX ORDER — DEXAMETHASONE 4 MG/1
TABLET ORAL
Qty: 30 TABLET | Refills: 0 | Status: SHIPPED | OUTPATIENT
Start: 2018-08-27

## 2018-08-27 RX ADMIN — TIOTROPIUM BROMIDE 18 MCG: 18 CAPSULE ORAL; RESPIRATORY (INHALATION) at 10:09

## 2018-08-27 RX ADMIN — POTASSIUM CHLORIDE AND SODIUM CHLORIDE 75 ML/HR: 900; 300 INJECTION, SOLUTION INTRAVENOUS at 18:09

## 2018-08-27 RX ADMIN — PANTOPRAZOLE SODIUM 40 MG: 40 INJECTION, POWDER, FOR SOLUTION INTRAVENOUS at 05:55

## 2018-08-27 RX ADMIN — ONDANSETRON 4 MG: 2 INJECTION INTRAMUSCULAR; INTRAVENOUS at 18:06

## 2018-08-27 RX ADMIN — PROMETHAZINE HYDROCHLORIDE 12.5 MG: 25 INJECTION INTRAMUSCULAR; INTRAVENOUS at 00:30

## 2018-08-27 RX ADMIN — DEXAMETHASONE SODIUM PHOSPHATE 4 MG: 4 INJECTION, SOLUTION INTRAMUSCULAR; INTRAVENOUS at 18:06

## 2018-08-27 RX ADMIN — OLANZAPINE 5 MG: 5 TABLET, ORALLY DISINTEGRATING ORAL at 10:09

## 2018-08-27 RX ADMIN — OLANZAPINE 5 MG: 5 TABLET, ORALLY DISINTEGRATING ORAL at 18:06

## 2018-08-27 RX ADMIN — ONDANSETRON 4 MG: 2 INJECTION INTRAMUSCULAR; INTRAVENOUS at 10:27

## 2018-08-27 RX ADMIN — POTASSIUM CHLORIDE AND SODIUM CHLORIDE 75 ML/HR: 900; 300 INJECTION, SOLUTION INTRAVENOUS at 00:31

## 2018-08-27 RX ADMIN — CITALOPRAM HYDROBROMIDE 20 MG: 10 SOLUTION ORAL at 10:08

## 2018-08-27 RX ADMIN — DEXAMETHASONE SODIUM PHOSPHATE 4 MG: 4 INJECTION, SOLUTION INTRAMUSCULAR; INTRAVENOUS at 10:08

## 2018-08-27 RX ADMIN — CLONIDINE HYDROCHLORIDE 0.1 MG: 0.1 TABLET ORAL at 23:35

## 2018-08-27 NOTE — CASE MANAGEMENT
Glenna Calabrese RN Registered Nurse Addendum   Case Management Date of Service: 8/25/2018  4:51 PM           Initial Clinical Review   Admission: Date/Time/Statement: 8/24/18 @ 4458            Orders Placed This Encounter   Procedures    Inpatient Admission       Standing Status:   Standing       Number of Occurrences:   1       Order Specific Question:   Admitting Physician       Answer:   David Camejo [[id number]]       Order Specific Question:   Level of Care       Answer:   Med Surg [16]       Order Specific Question:   Estimated length of stay       Answer:   More than 2 Midnights       Order Specific Question:   Certification       Answer:   I certify that inpatient services are medically necessary for this patient for a duration of greater than two midnights  See H&P and MD Progress Notes for additional information about the patient's course of treatment  ED: Date/Time/Mode of Arrival:  Admitted from [address] [place] [address] 8/24     Chief Complaint: N/V/D  History of Illness: 80 y  o  female who presents as a direct admit for N/V/D   Patient was sent from the infusion center  Fernandez Garza has a history of gastric carcinoma received chemotherapy 2 weeks ago   Patient's states she has been having ongoing nausea vomiting and diarrhea   States he vomits 2 to 3 times a day as well as has loose stools about 3 times a day  Not able to tolerate much p o    States has not felt that week and has been able to ambulate okay   Today she was hydrated in the infusion center by her oncologist however was not improving so was referred for direct admission      Vital Signs:    08/24 0701  08/25 0700    08/25 0701  08/25 1654 Most Recent     Temperature (°F) 97 8898 8 97 7 97 7 (36 5)     Pulse 7698 89 89     Respirations 1820 20 20     Blood Pressure 90/59177/102 120/70 120/70     SpO2 (%) 9396 98 98     Abnormal Labs/Diagnostic Test Results:    Ref Range & Units 08/24/18 1906   Sodium 136 - 145 mmol/L 128 Potassium 3 5 - 5 3 mmol/L 3 3     Chloride 100 - 108 mmol/L 97     CO2 21 - 32 mmol/L 22    Anion Gap 4 - 13 mmol/L 9    BUN 5 - 25 mg/dL 14    Creatinine 0 60 - 1 30 mg/dL 0 69    Glucose 65 - 140 mg/dL 144         Ref Range & Units 08/24/18 1906   WBC 4 31 - 10 16 Thousand/uL 28 46     Past Medical/Surgical History: Active Ambulatory Problems     Diagnosis Date Noted    Cancer of abdominal esophagus ([place] [address] ) 07/05/2018    COPD [facility]chronic obstructive pulmonary disease) ([facility]) 08/08/2018    GERD (gastroesophageal reflux disease) 08/08/2018    Ulcerative colitis ([place] [address] ) 08/08/2018    Asthma 08/08/2018    Arthritis 08/08/2018    Hyperlipidemia 08/08/2018    Iron deficiency anemia 08/08/2018    Esophageal carcinoma ([place] [address] ) 08/24/2018    Diarrhea 08/24/2018    Dehydration 08/24/2018    Nausea and vomiting 08/24/2018           Past Medical History:   Diagnosis Date    Arthritis      Asthma      COPD [facility]chronic obstructive pulmonary disease) ([facility])      Ectopic pregnancy      GERD (gastroesophageal reflux disease)      Hiatal hernia      Hyperlipidemia      Hypertension      Iron deficiency anemia      Ulcerative colitis (HCC)      Admitting Diagnosis: Diarrhea [R19 7]  Dehydration [E86 0]  Diarrhea [R19 7]     Age/Sex: 80 y o  female     Assessment/Plan:        Nausea and vomiting   Assessment & Plan     Likely 2/2 chemo   Will check AXR   Zofran, IVF  Check BMP          Gastric carcinoma [facility])   Assessment & Plan     Received Chemo on 8/14  Management per Hem/Onc          * Diarrhea   Assessment & Plan     Likely 2/2 chemo  White count 2 days ago was elevated, however received neulasta  Check labs   Will r/o cdiff   IVF               VTE Prophylaxis: Enoxaparin (Lovenox)    Code Status: DNI/DNR- per patient    Anticipated Length of Stay: Chencho Toth will be admitted on an Inpatient basis with an anticipated length of stay of  atleast 2 midnights    Justification for [address] [place] [address]  Admission Orders:  M/S unit  Clear liquid diet  Monitor I&O's, daily weights  Stool for C-diff pending  Up with assistance     Consults: Oncology, Palliative care     Scheduled Meds:   Current Facility-Administered Medications:  acetaminophen 650 mg Oral Q6H PRN Chad Valerio MD     atorvastatin 10 mg Oral Daily Chad Valerio MD     citalopram 20 mg Oral Daily Chad Valerio MD     dexamethasone 2 mg Intravenous BID Jess Clayton MD     enoxaparin 40 mg Subcutaneous Daily Chad Valerio MD     nicotine 1 patch Transdermal Daily Chad Valerio MD     OLANZapine 2 5 mg Oral Daily Fred Rubio MD     OLANZapine 5 mg Oral HS Fred Rubio MD     ondansetron 4 mg Intravenous Q6H PRN Chad Valerio MD     pantoprazole 40 mg Intravenous Q24H [address] Chad Valerio MD     sodium chloride 100 mL/hr Intravenous Continuous Chad Valerio MD Last Rate: 100 mL/hr (08/25/18 1549)   tiotropium 18 mcg Inhalation Daily Chad Valerio MD        Facility-Administered Medications Ordered in Other Encounters:  [START ON 8/27/2018] alteplase 2 mg Intracatheter PRN Lubna Knight MD   alteplase 2 mg Intracatheter PRN Lubna Knight MD   aluminum-magnesium hydroxide-simethicone 30 mL Oral Once Lubna Knight MD   [START ON 8/27/2018] heparin lock flush 300 Units Intracatheter Q1H PRN Lubna Knight MD   heparin lock flush 300 Units Intracatheter Q1H PRN Lubna Knight MD   pantoprazole 20 mg Oral Once Lubna Knight MD      Continuous Infusions:   sodium chloride 100 mL/hr Last Rate: 100 mL/hr (08/25/18 1549)    PRN Meds:   Acetaminophen  Metoclopramide 10 mg IV x1    ondansetron 4 mg IV x2  Oncology consult:   Assessment:  In summary, this is an 28-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  Chemotherapy was instituted in the neoadjuvant setting   Unfortunately she has had significant toxicity, nausea, vomiting, diarrhea    We reviewed that a change in treatment plan is appropriate   I would not pursue that chemotherapy program any further in her case  Other chemotherapy programs could be considered  Alternatively, other treatment approaches such as G-tube, radiation therapy, resection, etc could be considered for palliative purposes not necessarily intending cure or long-term survival   Recent conversations have revolved around the possibility of palliative care without active cancer treatment  The patient states that she understands that this may disappoint family members, friends, or caregivers but that she feels fairly strongly that she does not wish to pursue further treatment for her cancer  Unfortunately, she has significant morbidity from her cancer but palliative care approach is reasonable to deal with this very problem         Palliative care consult:  admitted from [facility] with intractable nausea and vomiting  Feels terrible and does not feel that she can continue with chemotherapy  We talked about her goals and values  She is worried that deciding to stop treatment would disappoint her oncologist and friends but she feels that she wants to be at home and be comfortable  She denies any pain at present  It seems very likely that she is struggling because of her acute symptom distress and that she may make a different decision if symptoms able to be managed  I offered to have the hospice liaison come and talk without commitment and that we will focus on her symptoms initially and then reassess her goals      Plan:  Consult hospice liaison at patient request  Dexamethasone 2mg bid iv  Olanzapine ODT 2 5 mg in am and 5 mg at bedtime

## 2018-08-27 NOTE — SOCIAL WORK
CM informed by [facility], pt approved for home-hospice services  Pt to be d/c to home tomorrow for hospice to have same-day visit  Pt reports she will have a ride and will be home prior to noon tomorrow for hospice visit

## 2018-08-27 NOTE — ASSESSMENT & PLAN NOTE
· Newly diagnosed  · Received chemo on 8/14  · Patient expressed desire to stop treatment and for discharge home with hospice services  · Home hospice can be set up for tomorrow   Plan for discharge tomorrow

## 2018-08-27 NOTE — PROGRESS NOTES
Progress Note - Palliative & Supportive Care  Abrahan Diez  80 y o   female  PPHP 630/PPHP 630-01   MRN: [id number]  Encounter: [id number]     Assessment  Patient Active Problem List   Diagnosis    Cancer of abdominal esophagus (HCC)    COPD (chronic obstructive pulmonary disease) (HCC)    GERD (gastroesophageal reflux disease)    Ulcerative colitis (HCC)    Asthma    Arthritis    Hyperlipidemia    Iron deficiency anemia    Esophageal carcinoma (HCC)    Diarrhea    Dehydration    Nausea and vomiting    Leukocytosis    Hypokalemia    Severe protein-calorie malnutrition (HCC)     Plan:  Symptom Management:   Intractable Nausea/vomitting -    Managed with current regimen of Decadron, Zyprexa and Scop patch- scripts provided for discharge    Goals of Care:   Level 3 code status  Home with hospice tomorrow    History  Overall, doing better  Had a BM and ate 2 strawberries without vomiting  She is hopeful to leave the hospital soon and has decided to pursue hospice services at home  She is satisfied with her symptom management at this time  ROS: Pertinent items are noted in HPI      Meds  all current active meds have been reviewed and current meds:   Current Facility-Administered Medications   Medication Dose Route Frequency    acetaminophen (TYLENOL) tablet 650 mg  650 mg Oral Q6H PRN    aluminum-magnesium hydroxide-simethicone (MYLANTA) 200-200-20 mg/5 mL oral suspension 20 mL  20 mL Oral Q4H PRN    citalopram (CeleXA) oral solution 20 mg  20 mg Oral Daily    dexamethasone (DECADRON) injection 4 mg  4 mg Intravenous BID    melatonin tablet 6 mg  6 mg Oral HS PRN    nicotine (NICODERM CQ) 14 mg/24hr TD 24 hr patch 1 patch  1 patch Transdermal Daily    OLANZapine (ZyPREXA ZYDIS) dispersible tablet 5 mg  5 mg Oral BID    ondansetron (ZOFRAN) injection 4 mg  4 mg Intravenous Q4H PRN    oxymetazoline (AFRIN) 0 05 % nasal spray 2 spray  2 spray Each Nare Q12H PRN    pantoprazole (PROTONIX) injection 40 mg  40 mg Intravenous Q24H [facility]    scopolamine (TRANSDERM-SCOP) 1 5 mg/3 days TD 72 hr patch 1 patch  1 patch Transdermal Q72H    sodium chloride 0 9 % with KCl 40 mEq/L infusion (premix)  75 mL/hr Intravenous Continuous    tiotropium (SPIRIVA) capsule for inhaler 18 mcg  18 mcg Inhalation Daily     Facility-Administered Medications Ordered in Other Encounters   Medication Dose Route Frequency    alteplase (CATHFLO) injection 2 mg  2 mg Intracatheter PRN    heparin lock flush 100 units/mL injection 300 Units  300 Units Intracatheter Q1H PRN       No Known Allergies    Objective  Physical Exam:   Vitals:    08/27/18 0847   BP: 110/66   Pulse: 92   Resp: 20   Temp: 97 7 °F (36 5 °C)   SpO2: 95%   Constitutional: Chronically ill appearing  In no acute distress  Head: Normocephalic and atraumatic  Eyes: EOM are normal  Right eye exhibits no discharge  Left eye exhibits no discharge  No scleral icterus  Pulmonary/Chest: Effort normal  No stridor  No respiratory distress  Abdominal: No distension  Soft  Musculoskeletal: No edema  Neurological: Alert, oriented and appropriately conversant  Skin: pale  Psychiatric: Displays a normal mood and affect  Behavior, judgement and thought content appear normal    Vitals reviewed  Lab Results:   I have personally reviewed pertinent labs  , CBC:   Lab Results   Component Value Date    WBC 43 43 (HH) 08/27/2018    HGB 10 2 (L) 08/27/2018    HCT 33 0 (L) 08/27/2018    MCV 97 08/27/2018     08/27/2018    MCH 29 9 08/27/2018    MCHC 30 9 (L) 08/27/2018    RDW 14 0 08/27/2018    MPV 10 3 08/27/2018    NRBC 0 08/27/2018   , CMP:   Lab Results   Component Value Date     08/27/2018    K 3 7 08/27/2018     08/27/2018    CO2 25 08/27/2018    BUN 21 08/27/2018    CREATININE 0 58 (L) 08/27/2018    CALCIUM 8 0 (L) 08/27/2018    EGFR 86 08/27/2018       Counseling / Coordination of Care  Total floor / unit time spent today 20+ minutes   Symptom assessment, dispo planning       Dane Best DO  Palliative & Supportive Care  Office number: [phone]

## 2018-08-27 NOTE — PROGRESS NOTES
Progress Note - Gracia Chaparro 1935, 80 y o  female MRN: [id number]    Unit/Bed#: [facility] 630-01 Encounter: [id number]    Primary Care Provider: Marino Gardner MD   Date and time admitted to hospital: 8/24/2018  4:35 PM        * Nausea and vomiting   Assessment & Plan    · Intractable nausea and vomiting, possibly chemotherapy induced but also likely mechanical due to tumor  EGD showed circumferential mass at level of the EG junction and tumor occupying 75-99% circumference of the cardia  · Appreciate palliative input- patient started on Decadron, Zyprexa, and scopolamine  · Discussed with patient evaluation to see if palliative stenting would be an option  However, patient declines evaluation for this as she would not want stent even if it were an option  · Continue supportive care        Severe protein-calorie malnutrition ([facility] )   Assessment & Plan    Malnutrition Findings:   Malnutrition type: Acute illness (related to gastric cancer as evidenced by 6% weight loss over the past month and <50% energy intake needs met >5 days treated with: clear liquid diet (patient to possibly transition to hospice))  Degree of Malnutrition: Other severe protein calorie malnutrition          Esophageal carcinoma ([facility] )   Assessment & Plan    · Newly diagnosed  · Received chemo on 8/14  · Patient expressed desire to stop treatment and for discharge home with hospice services  · Home hospice can be set up for tomorrow  Plan for discharge tomorrow        Diarrhea   Assessment & Plan    · Likely 2/2 chemo  · C diff was ordered but patient has had no further diarrhea  · D/C cdiff and contact precautions        Leukocytosis   Assessment & Plan    · Likely due to Neulasta  No current signs/symptoms of infection  Monitor    · Home hospice tomorrow- will stop monitoring labs          VTE Pharmacologic Prophylaxis:   Pharmacologic: None-hospice  Mechanical VTE Prophylaxis in Place: No    Patient Centered Rounds:      Discussions with Specialists or Other Care Team Provider: KAREN    Education and Discussions with Family / Patient: Patient    Time Spent for Care: 20 minutes  More than 50% of total time spent on counseling and coordination of care as described above  Current Length of Stay: 3 day(s)    Current Patient Status: Inpatient   Certification Statement: The patient will continue to require additional inpatient hospital stay due to discharge planning    Discharge Plan: Home hospice tomorrow    Code Status: Level 3 - DNAR and DNI      Subjective:   Had a BM today which she is happy about  Feels she is vomiting less although she did vomit the strawberry she ate this morning  Objective:     Vitals:   Temp (24hrs), Av 9 °F (36 6 °C), Min:97 7 °F (36 5 °C), Max:98 2 °F (36 8 °C)    HR:  [78-98] 92  Resp:  [17-20] 20  BP: (110-147)/() 110/66  SpO2:  [89 %-96 %] 95 %  There is no height or weight on file to calculate BMI  Input and Output Summary (last 24 hours): Intake/Output Summary (Last 24 hours) at 18 1216  Last data filed at 18 0601   Gross per 24 hour   Intake           1602 5 ml   Output              500 ml   Net           1102 5 ml       Physical Exam:     Physical Exam   Constitutional: She is oriented to person, place, and time  No distress  HENT:   Head: Normocephalic and atraumatic  Eyes: No scleral icterus  Neck: Neck supple  Cardiovascular: Normal rate, regular rhythm and normal heart sounds  Pulmonary/Chest: Effort normal and breath sounds normal  No respiratory distress  She has no wheezes  Abdominal: Soft  Bowel sounds are normal  She exhibits no distension  There is no tenderness  There is no rebound  Musculoskeletal: She exhibits no edema  Neurological: She is alert and oriented to person, place, and time  Skin: Skin is warm and dry  She is not diaphoretic  Psychiatric: She has a normal mood and affect   Her behavior is normal        Additional Data: Labs:      Results from last 7 days  Lab Units 08/27/18  0611   WBC Thousand/uL 43 43*   HEMOGLOBIN g/dL 10 2*   HEMATOCRIT % 33 0*   PLATELETS Thousands/uL 254   LYMPHO PCT % 6*   MONO PCT MAN % 4   EOSINO PCT MANUAL % 0       Results from last 7 days  Lab Units 08/27/18  0611  08/22/18  1139   SODIUM mmol/L 142  < > 132*   POTASSIUM mmol/L 3 7  < > 3 4*   CHLORIDE mmol/L 107  < > 96*   CO2 mmol/L 25  < > 29   BUN mg/dL 21  < > 14   CREATININE mg/dL 0 58*  < > 0 88   CALCIUM mg/dL 8 0*  < > 9 4   ALK PHOS U/L  --   --  107   ALT U/L  --   --  16   AST U/L  --   --  13   < > = values in this interval not displayed  * I Have Reviewed All Lab Data Listed Above  * Additional Pertinent Lab Tests Reviewed:  All Labs Within Last 24 Hours Reviewed    Imaging:    Imaging Reports Reviewed Today Include: None  Imaging Personally Reviewed by Myself Includes:  None    Recent Cultures (last 7 days):           Last 24 Hours Medication List:     Current Facility-Administered Medications:  acetaminophen 650 mg Oral Q6H PRN Shahana Hernandez MD    aluminum-magnesium hydroxide-simethicone 20 mL Oral Q4H PRN Roda Eisenmenger, PA-C    citalopram 20 mg Oral Daily Kinga Mcfarlane MD    dexamethasone 4 mg Intravenous BID Kinga Mcfarlane MD    melatonin 6 mg Oral HS PRN Kinga Mcfarlane MD    nicotine 1 patch Transdermal Daily Shahana Hernandez MD    OLANZapine 5 mg Oral BID Kinga Mcfarlane MD    ondansetron 4 mg Intravenous Q4H PRN Kinga Mcfarlane MD    oxymetazoline 2 spray Each Nare Q12H PRN Kanu Christina PA-C    pantoprazole 40 mg Intravenous Q24H [address] Shahana Hernandez MD    scopolamine 1 patch Transdermal Q72H Kinga Mcfarlane MD    sodium chloride 0 9 % with KCl 40 mEq/L 75 mL/hr Intravenous Continuous Roda Eisenmenger, PA-C Last Rate: 75 mL/hr (08/27/18 0031)   tiotropium 18 mcg Inhalation Daily Shahana Hernandez MD      Facility-Administered Medications Ordered in Other Encounters:  alteplase 2 mg Intracatheter PRN Ti Gaytan MD   heparin lock flush 300 Units Intracatheter Q1H PRN Ti Gaytan MD        Today, Patient Was Seen By: Cb Marshall PA-C    ** Please Note: Dragon 360 Dictation voice to text software may have been used in the creation of this document   **

## 2018-08-27 NOTE — RESTORATIVE TECHNICIAN NOTE
Restorative Specialist Mobility Note       Activity: Ambulate in leahy, Ambulate in room, Bathroom privileges, Chair, Dangle, Stand at bedside (Educated/encouraged pt to ambulate with assistance 3-4 x's/day   Pt callbell, phone/tray within reach )     Assistive Device: None    Sophia AREVALO, Restorative Technician, [employer] [facility]

## 2018-08-27 NOTE — SOCIAL WORK
Scripts sent down to [address] to be filled prior to anticipated d/c to home with -hospice services when medically stable

## 2018-08-27 NOTE — ASSESSMENT & PLAN NOTE
· Likely due to Neulasta  No current signs/symptoms of infection  Monitor    · Home hospice tomorrow- will stop monitoring labs

## 2018-08-27 NOTE — PLAN OF CARE

## 2018-08-27 NOTE — ASSESSMENT & PLAN NOTE
· Intractable nausea and vomiting, possibly chemotherapy induced but also likely mechanical due to tumor  EGD showed circumferential mass at level of the EG junction and tumor occupying 75-99% circumference of the cardia  · Appreciate palliative input- patient started on Decadron, Zyprexa, and scopolamine  · Discussed with patient evaluation to see if palliative stenting would be an option   However, patient declines evaluation for this as she would not want stent even if it were an option  · Continue supportive care

## 2018-08-28 ENCOUNTER — TELEPHONE (OUTPATIENT)
Dept: HEMATOLOGY ONCOLOGY | Facility: CLINIC | Age: 83
End: 2018-08-28

## 2018-08-28 VITALS
TEMPERATURE: 97.6 F | SYSTOLIC BLOOD PRESSURE: 128 MMHG | DIASTOLIC BLOOD PRESSURE: 72 MMHG | RESPIRATION RATE: 18 BRPM | OXYGEN SATURATION: 94 % | HEART RATE: 96 BPM

## 2018-08-28 LAB — MRSA NOSE QL CULT: NORMAL

## 2018-08-28 PROCEDURE — 99239 HOSP IP/OBS DSCHRG MGMT >30: CPT | Performed by: NURSE PRACTITIONER

## 2018-08-28 RX ADMIN — PANTOPRAZOLE SODIUM 40 MG: 40 TABLET, DELAYED RELEASE ORAL at 05:45

## 2018-08-28 RX ADMIN — OLANZAPINE 5 MG: 5 TABLET, ORALLY DISINTEGRATING ORAL at 10:02

## 2018-08-28 RX ADMIN — TIOTROPIUM BROMIDE 18 MCG: 18 CAPSULE ORAL; RESPIRATORY (INHALATION) at 10:01

## 2018-08-28 RX ADMIN — CITALOPRAM HYDROBROMIDE 20 MG: 10 SOLUTION ORAL at 10:02

## 2018-08-28 NOTE — SOCIAL WORK
CM informed co-pay for meds will be $75 65  Pt agreeable with cost  Meds to be delivered to pt's room prior to d/c this AM  Pt's friend to provide transport home with AM for [facility] open this afternoon

## 2018-08-28 NOTE — TELEPHONE ENCOUNTER
Delilah Jones called to cancel patient's appt  With Brooks on 09/24/18  Patient discharged from hospital today and released to home hospice    Any questions, contact Delilah Jones at [phone]

## 2018-08-28 NOTE — DISCHARGE SUMMARY
Discharge- Gracia Chaparro 1935, 80 y o  female MRN: [id number]    Unit/Bed#: [facility] 630-01 Encounter: [id number]    Primary Care Provider: Marino Gardner MD   Date and time admitted to hospital: 8/24/2018  4:35 PM        * Nausea and vomiting   Assessment & Plan    · Intractable nausea and vomiting, possibly chemotherapy induced but also likely mechanical due to tumor  EGD showed circumferential mass at level of the EG junction and tumor occupying 75-99% circumference of the cardia  · Palliative med patient made neck recommendations and patient has decided to go home on hospice as patient decline further evaluation for stent or other invasive interventions  · Continue supportive care  · Patient has able to tolerate small amounts of food and has had no vomiting overnight or this morning  Esophageal carcinoma ([address] )   Assessment & Plan    · Newly diagnosed  · Received chemo on 8/14  · Patient expressed desire to stop treatment and for discharge home with hospice services  · Home hospice has been set up for today and patient is discharging home        Diarrhea   Assessment & Plan    · Likely 2/2 chemo  · C diff was ordered but patient has had no further diarrhea  · D/C cdiff and contact precautions        Leukocytosis   Assessment & Plan    · Likely due to Neulasta  No current signs/symptoms of infection  Monitor    · Home hospice today stop monitoring labs        Hypokalemia   Assessment & Plan    · Add potassium to fluids  · Check Mg        Severe protein-calorie malnutrition (HCC)   Assessment & Plan    Malnutrition Findings:   Malnutrition type: Acute illness (related to gastric cancer as evidenced by 6% weight loss over the past month and <50% energy intake needs met >5 days treated with: clear liquid diet (patient to possibly transition to hospice))  Degree of Malnutrition: Other severe protein calorie malnutrition                Discharging Physician / Practitioner: DEBORAH Werner  PCP: Maria Guadalupe Wallis MD  Admission Date:   Admission Orders     Ordered        08/24/18 1714  Inpatient Admission  Once             Discharge Date: 08/28/18    Resolved Problems  Date Reviewed: 8/28/2018    None          Consultations During Hospital Stay:  · Oncology  · Palliative care    Procedures Performed:     · Obstruction series abdominal:  Nonobstructive bowel gas pattern  Large hiatal hernia  Significant Findings / Test Results:     · Esophageal carcinoma  · Nausea vomiting likely due to chemotherapy  · Diarrhea likely due to chemotherapy    Incidental Findings:   · None     Test Results Pending at Discharge (will require follow up):   · MRSA culture     Outpatient Tests Requested:  · None    Complications:  None    Reason for Admission:  Nausea and vomiting    Hospital Course:     Matt Reynolds is a 80 y o  female patient who originally presented to the hospital on 8/24/2018 due to nausea vomiting and in known patient with esophageal cancer receiving chemotherapy  Patient got no relief of her symptoms came in evaluated by Oncology aware and they discussed with the patient more conservative measures her vomiting possibility of palliative care  Patient came in occlusion of the her family friends and caregivers will be upset she wanted to pursue no further treatment of her cancer  Patient gradually showed symptomatic improvement patient was evaluated by palliative care who assisted patient on going home with home hospice  Given patient's decision case management worked on achieving home hospice per patient and she was discharged home  Please see above list of diagnoses and related plan for additional information       Condition at Discharge: stable     Discharge Day Visit / Exam:     Subjective:  Patient alert pleasant smiling stated she had no vomiting overnight she had about 20 group Center cup and states she finished her have along and was looking for to hold off on this morning given her nausea has seem to stop  Patient again accepting of her decision to go home with hospice and feels very positive in her decision and is looking for to going home  Vitals: Blood Pressure: 128/72 (08/28/18 0659)  Pulse: 96 (08/28/18 0659)  Temperature: 97 6 °F (36 4 °C) (08/28/18 0659)  Temp Source: Oral (08/28/18 0659)  Respirations: 18 (08/28/18 0659)  SpO2: 94 % (08/28/18 0659)  Exam:   Physical Exam   Constitutional: She is oriented to person, place, and time  She appears cachectic  HENT:   Head: Normocephalic and atraumatic  Eyes: Conjunctivae and EOM are normal    Neck: Normal range of motion  Cardiovascular: Normal rate, regular rhythm and normal heart sounds  Pulmonary/Chest: Effort normal and breath sounds normal    Abdominal: Soft  Bowel sounds are normal  There is no tenderness  Musculoskeletal: Normal range of motion  Neurological: She is alert and oriented to person, place, and time  Skin: Skin is warm and dry  Psychiatric: She has a normal mood and affect  Her behavior is normal            Discharge instructions/Information to patient and family:   See after visit summary for information provided to patient and family  Provisions for Follow-Up Care:  See after visit summary for information related to follow-up care and any pertinent home health orders  Disposition:     Other: Home with hospice     For Discharges to [facility] SNF:   · Not Applicable to this Patient - Not Applicable to this Patient    Planned Readmission:  None     Discharge Statement:  I spent 40 minutes discharging the patient  This time was spent on the day of discharge  I had direct contact with the patient on the day of discharge  Greater than 50% of the total time was spent examining patient, answering all patient questions, arranging and discussing plan of care with patient as well as directly providing post-discharge instructions    Additional time then spent on discharge activities  Discharge Medications:  See after visit summary for reconciled discharge medications provided to patient and family        ** Please Note: This note has been constructed using a voice recognition system **

## 2018-08-28 NOTE — ASSESSMENT & PLAN NOTE
· Intractable nausea and vomiting, possibly chemotherapy induced but also likely mechanical due to tumor  EGD showed circumferential mass at level of the EG junction and tumor occupying 75-99% circumference of the cardia  · Palliative med patient made neck recommendations and patient has decided to go home on hospice as patient decline further evaluation for stent or other invasive interventions  · Continue supportive care  · Patient has able to tolerate small amounts of food and has had no vomiting overnight or this morning

## 2018-08-28 NOTE — PROGRESS NOTES
Patient had elevated BP overnight, clonidine was ordered and BP was decreased  IV was leaking, SLIM was paged and IV taken out and order to D/C fluid was put in  IV medications were changed to PO  Expected discharge tomorrow

## 2018-08-28 NOTE — PLAN OF CARE
PAIN - ADULT     Verbalizes/displays adequate comfort level or baseline comfort level Adequate for Discharge        Prexisting or High Potential for Compromised Skin Integrity     Skin integrity is maintained or improved Adequate for Discharge

## 2018-08-28 NOTE — HOSPICE NOTE
LATE ENTRY; met with patient and she is approved for home hospice  Spoke with sister Cecil Buenrostro on phone  Cecil Buenrostro tearful, but states she is in agreement with patients decision  Consents signed and faxed to [facility] office  Pt to have friend pick her up tomorrow by noon and then will be home for same day open to hospice  KAREN Nance aware  Scripts for comfort meds sent down to pharmacy and will be filled for pt to take home  Dr Reji Reeder wrote scripts  All in agreement with discharge plan

## 2018-08-28 NOTE — PROGRESS NOTES
Shower alarm went off, patient was found in the shower on the floor  Patient stated she did not hit her head or hurt herself  Patient stated she slide down to the floor  Body was visualized, no scrapes, bruises, or complaints of pain  Thomas PRIEST was notified  She stated she does not want any scans to be completed  Monitor patient for any declines in mental status

## 2018-08-28 NOTE — PLAN OF CARE
INFECTION - ADULT     Absence or prevention of progression during hospitalization Completed     Absence of fever/infection during neutropenic period Completed        Potential for Falls     Patient will remain free of falls Completed        SAFETY ADULT     Maintain or return to baseline ADL function Completed     Maintain or return mobility status to optimal level Completed          PAIN - ADULT     Verbalizes/displays adequate comfort level or baseline comfort level Progressing        Prexisting or High Potential for Compromised Skin Integrity     Skin integrity is maintained or improved Progressing        SAFETY ADULT     Patient will remain free of falls Progressing

## 2018-08-28 NOTE — ASSESSMENT & PLAN NOTE
· Likely due to Neulasta  No current signs/symptoms of infection  Monitor    · Home hospice today stop monitoring labs

## 2018-08-28 NOTE — ASSESSMENT & PLAN NOTE
· Newly diagnosed  · Received chemo on 8/14  · Patient expressed desire to stop treatment and for discharge home with hospice services  · Home hospice has been set up for today and patient is discharging home

## 2018-08-28 NOTE — PLAN OF CARE
Problem: DISCHARGE PLANNING - CARE MANAGEMENT  Goal: Discharge to post-acute care or home with appropriate resources  INTERVENTIONS:  - Conduct assessment to determine patient/family and health care team treatment goals, and need for post-acute services based on payer coverage, community resources, and patient preferences, and barriers to discharge  - Address psychosocial, clinical, and financial barriers to discharge as identified in assessment in conjunction with the patient/family and health care team  - Arrange appropriate level of post-acute services according to patient's   needs and preference and payer coverage in collaboration with the physician and health care team  - Communicate with and update the patient/family, physician, and health care team regarding progress on the discharge plan  - Arrange appropriate transportation to post-acute venues  -D/c to home today with [facility]  Outcome: Adequate for Discharge

## 2020-09-08 NOTE — SOCIAL WORK
MSW received call from [facility], [address] [place] [address] in infusion that pt was receiving treatment on this day  MSW went to meet with pt and found her to be feeling ill  Pt shared that she has had "more bad days than good" since receiving her last chemo  The pt was vomiting periodically  The pt spoke about her quality of life and how she is thinking she davis snot want to continue with treatment, "but everyone gives me a hard time about stopping  Isn't it my own decision?"  The pt spent a great deal of time talking about her not wanting to continue and how she feels prepared to die  MSW listened and offered emotional support  MSW encouraged the pt to not make any decisions on this day when she was feeling so bad  The pt proceeded to talk about her , who has been  for 14 years  She reminisced about their marriage and shared some laughter  The pt spoke about her sisters, one living and one  and her mother, who she described as very much an authoritarian   Pt appeared to enjoy this life review  Significant emotional support was provided  No

## 2021-03-03 NOTE — PROGRESS NOTES
Pt has called 2X since leaving infusion services to clarify things about the pump  First was asking if pump should be removed from the case, Suggested pump should remain in the case  2nd time was because patient dropped the pump on the floor and was worried it was broken  Verified with patient green light blinking, pump running and port access intact without issues  Reinforced patient should call home star with any issues  Opt out

## (undated) DEVICE — SUT MONOCRYL 4-0 PS-2 27 IN Y426H

## (undated) DEVICE — GAUZE SPONGES,USP TYPE VII GAUZE, 12 PLY: Brand: CURITY

## (undated) DEVICE — SUT VICRYL 3-0 SH 27 IN J416H

## (undated) DEVICE — BULB SYRINGE,IRRIGATION WITH PROTECTIVE CAP: Brand: DOVER

## (undated) DEVICE — STERILE ACCESS CATHETER PACK: Brand: CARDINAL HEALTH

## (undated) DEVICE — REM POLYHESIVE ADULT PATIENT RETURN ELECTRODE: Brand: VALLEYLAB

## (undated) DEVICE — SCD SEQUENTIAL COMPRESSION COMFORT SLEEVE MEDIUM KNEE LENGTH: Brand: KENDALL SCD

## (undated) DEVICE — 3000CC GUARDIAN II: Brand: GUARDIAN

## (undated) DEVICE — INTENDED FOR TISSUE SEPARATION, AND OTHER PROCEDURES THAT REQUIRE A SHARP SURGICAL BLADE TO PUNCTURE OR CUT.: Brand: BARD-PARKER SAFETY BLADES SIZE 15, STERILE

## (undated) DEVICE — TUBING SUCTION 5MM X 12 FT

## (undated) DEVICE — GAUZE SPONGES,16 PLY: Brand: CURITY

## (undated) DEVICE — INTENDED FOR TISSUE SEPARATION, AND OTHER PROCEDURES THAT REQUIRE A SHARP SURGICAL BLADE TO PUNCTURE OR CUT.: Brand: BARD-PARKER SAFETY BLADES SIZE 11, STERILE

## (undated) DEVICE — BAG DECANTER

## (undated) DEVICE — GLOVE INDICATOR PI UNDERGLOVE SZ 8 BLUE

## (undated) DEVICE — 3M™ STERI-STRIP™ REINFORCED ADHESIVE SKIN CLOSURES, R1547, 1/2 IN X 4 IN (12 MM X 100 MM), 6 STRIPS/ENVELOPE: Brand: 3M™ STERI-STRIP™

## (undated) DEVICE — SUT SILK 2-0 18 IN A185H

## (undated) DEVICE — CHLORAPREP HI-LITE 26ML ORANGE

## (undated) DEVICE — GLOVE SRG BIOGEL ECLIPSE 8

## (undated) DEVICE — MEDI-VAC YANK SUCT HNDL W/TPRD BULBOUS TIP: Brand: CARDINAL HEALTH

## (undated) DEVICE — SUT PROLENE 2-0 SH 36 IN 8523H

## (undated) DEVICE — 3M™ TEGADERM™ TRANSPARENT FILM DRESSING FRAME STYLE, 1626W, 4 IN X 4-3/4 IN (10 CM X 12 CM), 50/CT 4CT/CASE: Brand: 3M™ TEGADERM™

## (undated) DEVICE — PLUMEPEN PRO 10FT